# Patient Record
Sex: MALE | Race: ASIAN | NOT HISPANIC OR LATINO | Employment: OTHER | URBAN - METROPOLITAN AREA
[De-identification: names, ages, dates, MRNs, and addresses within clinical notes are randomized per-mention and may not be internally consistent; named-entity substitution may affect disease eponyms.]

---

## 2017-02-21 ENCOUNTER — OFFICE VISIT (OUTPATIENT)
Dept: URGENT CARE | Facility: URGENT CARE | Age: 57
End: 2017-02-21
Payer: COMMERCIAL

## 2017-02-21 VITALS
SYSTOLIC BLOOD PRESSURE: 128 MMHG | HEART RATE: 85 BPM | DIASTOLIC BLOOD PRESSURE: 85 MMHG | OXYGEN SATURATION: 99 % | TEMPERATURE: 99.1 F | WEIGHT: 188 LBS

## 2017-02-21 DIAGNOSIS — R07.89 CHEST TIGHTNESS: ICD-10-CM

## 2017-02-21 DIAGNOSIS — R50.9 FEVER, UNSPECIFIED: ICD-10-CM

## 2017-02-21 DIAGNOSIS — J18.9 PNEUMONIA DUE TO INFECTIOUS ORGANISM, UNSPECIFIED LATERALITY, UNSPECIFIED PART OF LUNG: Primary | ICD-10-CM

## 2017-02-21 PROCEDURE — 99204 OFFICE O/P NEW MOD 45 MIN: CPT | Performed by: PHYSICIAN ASSISTANT

## 2017-02-21 RX ORDER — CODEINE PHOSPHATE AND GUAIFENESIN 10; 100 MG/5ML; MG/5ML
5-10 SOLUTION ORAL
Qty: 120 ML | Refills: 0 | Status: SHIPPED | OUTPATIENT
Start: 2017-02-21 | End: 2018-05-17

## 2017-02-21 RX ORDER — AZITHROMYCIN 250 MG/1
TABLET, FILM COATED ORAL
Qty: 6 TABLET | Refills: 0 | Status: SHIPPED | OUTPATIENT
Start: 2017-02-21 | End: 2018-05-17

## 2017-02-21 RX ORDER — ALBUTEROL SULFATE 90 UG/1
2 AEROSOL, METERED RESPIRATORY (INHALATION) EVERY 6 HOURS
Qty: 1 INHALER | Refills: 0 | Status: SHIPPED | OUTPATIENT
Start: 2017-02-21 | End: 2019-12-23

## 2017-02-21 NOTE — NURSING NOTE
Chief Complaint   Patient presents with     Cough     cough, cold, itchy throat, off/on fever, running stuffy nose since last Wednesday - pt would like to be checked for Pneumonia.        Initial /85 (BP Location: Left arm, Patient Position: Chair, Cuff Size: Adult Regular)  Pulse 85  Temp 99.1  F (37.3  C) (Oral)  Wt 188 lb (85.3 kg)  SpO2 99% There is no height or weight on file to calculate BMI.  Medication Reconciliation: complete

## 2017-02-21 NOTE — MR AVS SNAPSHOT
"              After Visit Summary   2017    Drew Bradford    MRN: [id number]           Patient Information     Date Of Birth          1960        Visit Information        Provider Department      2017 3:45 PM Logan Carnes PA-C [facility]        Today's Diagnoses     Pneumonia due to infectious organism, unspecified laterality, unspecified part of lung    -  1    Fever, unspecified        Chest tightness           Follow-ups after your visit        Who to contact     If you have questions or need follow up information about today's clinic visit or your schedule please contact [facility] directly at [phone].  Normal or non-critical lab and imaging results will be communicated to you by MyChart, letter or phone within 4 business days after the clinic has received the results. If you do not hear from us within 7 days, please contact the clinic through MyChart or phone. If you have a critical or abnormal lab result, we will notify you by phone as soon as possible.  Submit refill requests through [employer] or call your pharmacy and they will forward the refill request to us. Please allow 3 business days for your refill to be completed.          Additional Information About Your Visit        MyChart Information     [employer] lets you send messages to your doctor, view your test results, renew your prescriptions, schedule appointments and more. To sign up, go to [email or web address] . Click on \"Log in\" on the left side of the screen, which will take you to the Welcome page. Then click on \"Sign up Now\" on the right side of the page.     You will be asked to enter the access code listed below, as well as some personal information. Please follow the directions to create your username and password.     Your access code is: [email or web address]  Expires: 2017  8:30 PM     Your access code will  in 90 days. If you need help or a new code, please call " your [facility] clinic or [phone].        Care EveryWhere ID     This is your Care EveryWhere ID. This could be used by other organizations to access your [facility] medical records  [id number]        Your Vitals Were     Pulse Temperature Pulse Oximetry             85 99.1  F (37.3  C) (Oral) 99%          Blood Pressure from Last 3 Encounters:   02/21/17 128/85    Weight from Last 3 Encounters:   02/21/17 188 lb (85.3 kg)              Today, you had the following     No orders found for display         Today's Medication Changes          These changes are accurate as of: 2/21/17  8:30 PM.  If you have any questions, ask your nurse or doctor.               Start taking these medicines.        Dose/Directions    albuterol 108 (90 BASE) MCG/ACT Inhaler   Commonly known as:  PROVENTIL HFA   Used for:  Chest tightness   Started by:  Logan Carnes PA-C        Dose:  2 puff   Inhale 2 puffs into the lungs every 6 hours   Quantity:  1 Inhaler   Refills:  0       azithromycin 250 MG tablet   Commonly known as:  ZITHROMAX   Used for:  Pneumonia due to infectious organism, unspecified laterality, unspecified part of lung   Started by:  Logan Carnes PA-C        2 tabs po qd day 1, then 1 tab po qd days 2-5   Quantity:  6 tablet   Refills:  0       guaiFENesin-codeine 100-10 MG/5ML Soln solution   Commonly known as:  ROBITUSSIN AC   Used for:  Pneumonia due to infectious organism, unspecified laterality, unspecified part of lung   Started by:  Logan Carnes PA-C        Dose:  5-10 mL   Take 5-10 mLs by mouth nightly as needed for cough   Quantity:  120 mL   Refills:  0            Where to get your medicines      These medications were sent to [id number] - [facility] [address] [facility]  [address] [address] [facility] [address]     Phone:  [phone]     albuterol 108 (90 BASE) MCG/ACT Inhaler    azithromycin 250 MG tablet         Some of these will need a paper prescription and others can  be bought over the counter.  Ask your nurse if you have questions.     Bring a paper prescription for each of these medications     guaiFENesin-codeine 100-10 MG/5ML Soln solution                Primary Care Provider    None Specified       No primary provider on file.        Thank you!     Thank you for choosing [facility]  for your care. Our goal is always to provide you with excellent care. Hearing back from our patients is one way we can continue to improve our services. Please take a few minutes to complete the written survey that you may receive in the mail after your visit with us. Thank you!             Your Updated Medication List - Protect others around you: Learn how to safely use, store and throw away your medicines at www.disposemymeds.org.          This list is accurate as of: 2/21/17  8:30 PM.  Always use your most recent med list.                   Brand Name Dispense Instructions for use    albuterol 108 (90 BASE) MCG/ACT Inhaler    PROVENTIL HFA    1 Inhaler    Inhale 2 puffs into the lungs every 6 hours       azithromycin 250 MG tablet    ZITHROMAX    6 tablet    2 tabs po qd day 1, then 1 tab po qd days 2-5       guaiFENesin-codeine 100-10 MG/5ML Soln solution    ROBITUSSIN AC    120 mL    Take 5-10 mLs by mouth nightly as needed for cough       UNKNOWN TO PATIENT      One blood pressure medication daily - medication from [place]. Unknown Name.       VICKS DAYQUIL COUGH PO

## 2017-02-22 NOTE — PROGRESS NOTES
SUBJECTIVE:   Drew Bradford is a 56 year old male presenting with a chief complaint of fever, facial pain/pressure and chest congestion, productive cough.  Onset of symptoms was 6 day(s) ago.  Course of illness is worsening.    Severity moderate  Current and Associated symptoms: productive cough, chest congestion and wheezing  Treatment measures tried include OTC meds.  Predisposing factors include recent illness.    PMH:  None    Past Fam Hx:  HTN  ASTHMA     Allergies   Allergen Reactions     Penicillins      Amoxicillin Rash     Back pain, kidney pain.          Social History   Substance Use Topics     Smoking status: Never Smoker     Smokeless tobacco: Never Used     Alcohol use Not on file       ROS:  CONSTITUTIONAL:POSITIVE  for chills  EYES: Negative for redness or discharge  INTEGUMENTARY/SKIN: NEGATIVE for worrisome rashes, moles or lesions  ENT/MOUTH: POSITIVE for nasal congestion  RESP:POSITIVE for cough-productive  CV: NEGATIVE for chest pain, palpitations or peripheral edema  : NEGATIVE for dysuria or frequency  GI: NEGATIVE for nausea, abdominal pain, heartburn, or change in bowel habits  MUSCULOSKELETAL: NEGATIVE for significant arthralgias or myalgia  VASC:POSITIVE for cold extremities with chills  NEURO: NEGATIVE for weakness, dizziness or paresthesias    OBJECTIVE  :/85 (BP Location: Left arm, Patient Position: Chair, Cuff Size: Adult Regular)  Pulse 85  Temp 99.1  F (37.3  C) (Oral)  Wt 188 lb (85.3 kg)  SpO2 99%  GENERAL APPEARANCE: healthy, alert and no distress  EYES: EOMI,  PERRL, conjunctiva clear  HENT: ear canals and TM's normal.  Nose and mouth without ulcers, erythema or lesions  NECK: supple, nontender, no lymphadenopathy  RESP: rhonchi R lower anterior and R lower posterior  CV: regular rates and rhythm, normal S1 S2, no murmur noted  ABDOMEN:  soft, nontender, no HSM or masses and bowel sounds normal  MS:Normal ROM, negative for muscle tenderness with palpation  NEURO: Normal  strength and tone, sensory exam grossly normal,  normal speech and mentation  SKIN: no suspicious lesions or rashes    ASSESSMENT/PLAN:    ICD-10-CM    1. Pneumonia due to infectious organism, unspecified laterality, unspecified part of lung J18.9 azithromycin (ZITHROMAX) 250 MG tablet     guaiFENesin-codeine (ROBITUSSIN AC) 100-10 MG/5ML SOLN solution   2. Fever, unspecified R50.9    3. Chest tightness R07.89 albuterol (PROVENTIL HFA) 108 (90 BASE) MCG/ACT Inhaler     Use albuterol for wheezing and chest tightness  Tylenol or advil for fever  Fluids, rest  Advise having follow up with PCP for recheck  If symptoms were to worsen then go to the ED

## 2018-05-17 ENCOUNTER — OFFICE VISIT (OUTPATIENT)
Dept: FAMILY MEDICINE | Facility: CLINIC | Age: 58
End: 2018-05-17
Payer: COMMERCIAL

## 2018-05-17 VITALS
OXYGEN SATURATION: 98 % | SYSTOLIC BLOOD PRESSURE: 128 MMHG | BODY MASS INDEX: 27.58 KG/M2 | HEART RATE: 72 BPM | TEMPERATURE: 99.2 F | HEIGHT: 69 IN | DIASTOLIC BLOOD PRESSURE: 82 MMHG | WEIGHT: 186.2 LBS

## 2018-05-17 DIAGNOSIS — Z76.89 ESTABLISHING CARE WITH NEW DOCTOR, ENCOUNTER FOR: ICD-10-CM

## 2018-05-17 DIAGNOSIS — J30.2 CHRONIC SEASONAL ALLERGIC RHINITIS, UNSPECIFIED TRIGGER: ICD-10-CM

## 2018-05-17 DIAGNOSIS — H52.4 PRESBYOPIA: ICD-10-CM

## 2018-05-17 DIAGNOSIS — I10 ESSENTIAL HYPERTENSION: Primary | ICD-10-CM

## 2018-05-17 PROCEDURE — 99214 OFFICE O/P EST MOD 30 MIN: CPT | Performed by: INTERNAL MEDICINE

## 2018-05-17 RX ORDER — METHYLPREDNISOLONE 4 MG
TABLET, DOSE PACK ORAL
Qty: 21 TABLET | Refills: 3 | Status: SHIPPED | OUTPATIENT
Start: 2018-05-17 | End: 2018-11-15

## 2018-05-17 RX ORDER — AMLODIPINE BESYLATE 10 MG/1
10 TABLET ORAL DAILY
Qty: 90 TABLET | Refills: 3 | Status: SHIPPED | OUTPATIENT
Start: 2018-05-17 | End: 2019-05-22

## 2018-05-17 RX ORDER — CETIRIZINE HYDROCHLORIDE 10 MG/1
10 TABLET ORAL EVERY EVENING
Qty: 90 TABLET | Refills: 3 | Status: SHIPPED | OUTPATIENT
Start: 2018-05-17 | End: 2019-05-22

## 2018-05-17 NOTE — MR AVS SNAPSHOT
After Visit Summary   5/17/2018    Drew Bardford    MRN: [id number]           Patient Information     Date Of Birth          1960        Visit Information        Provider Department      5/17/2018 4:45 PM Tara Mehta MD; [employer] [facility]        Today's Diagnoses     Essential hypertension    -  1    Chronic seasonal allergic rhinitis, unspecified trigger        Presbyopia        Establishing care with new doctor, encounter for           Follow-ups after your visit        Additional Services     ALLERGY/ASTHMA ADULT REFERRAL       Your provider has referred you to: [facility] [place] - [phone]  [email or web address]    Please be aware that coverage of these services is subject to the terms and limitations of your health insurance plan.  Call member services at your health plan with any benefit or coverage questions.      Please bring the following with you to your appointment:    (1) Any X-Rays, CTs or MRIs which have been performed.  Contact the facility where they were done to arrange for  prior to your scheduled appointment.    (2) List of current medications  (3) This referral request   (4) Any documents/labs given to you for this referral            OPHTHALMOLOGY ADULT REFERRAL       Your provider has referred you to: N: [facility] [place] [phone] [email or web address]    Please be aware that coverage of these services is subject to the terms and limitations of your health insurance plan.  Call member services at your health plan with any benefit or coverage questions.      Please bring the following with you to your appointment:    (1) Any X-Rays, CTs or MRIs which have been performed.  Contact the facility where they were done to arrange for  prior to your scheduled appointment.    (2) List of current medications  (3) This referral  "request   (4) Any documents/labs given to you for this referral                  Your next 10 appointments already scheduled     May 24, 2018 10:00 AM CDT   [facility]    [address] [facility] [id number] [phone]           Bring a current list of meds and any records pertaining to this visit. For Physicals, please bring immunization records and any forms needing to be filled out. Please arrive 10 minutes early to complete paperwork.              Who to contact     If you have questions or need follow up information about today's clinic visit or your schedule please contact [facility] directly at [phone].  Normal or non-critical lab and imaging results will be communicated to you by MyChart, letter or phone within 4 business days after the clinic has received the results. If you do not hear from us within 7 days, please contact the clinic through [employer] or phone. If you have a critical or abnormal lab result, we will notify you by phone as soon as possible.  Submit refill requests through [employer] or call your pharmacy and they will forward the refill request to us. Please allow 3 business days for your refill to be completed.          Additional Information About Your Visit        [employer] Information     [employer] lets you send messages to your doctor, view your test results, renew your prescriptions, schedule appointments and more. To sign up, go to [email or web address] . Click on \"Log in\" on the left side of the screen, which will take you to the Welcome page. Then click on \"Sign up Now\" on the right side of the page.     You will be asked to enter the access code listed below, as well as some personal information. Please follow the directions to create your username and password.     Your access code is: [email or web address]  Expires: 8/15/2018  5:11 PM     Your access code will  in 90 days. If you need " "help or a new code, please call your [facility] clinic or [phone].        Care EveryWhere ID     This is your Care EveryWhere ID. This could be used by other organizations to access your [facility] medical records  [id number]        Your Vitals Were     Pulse Temperature Height Pulse Oximetry BMI (Body Mass Index)       72 99.2  F (37.3  C) 5' 9\" (1.753 m) 98% 27.5 kg/m2        Blood Pressure from Last 3 Encounters:   05/17/18 128/82   02/21/17 128/85    Weight from Last 3 Encounters:   05/17/18 186 lb 3.2 oz (84.5 kg)   02/21/17 188 lb (85.3 kg)              We Performed the Following     ALLERGY/ASTHMA ADULT REFERRAL     [facility] ACCESS CODE - Add PCP and Click on cosign on right     OPHTHALMOLOGY ADULT REFERRAL          Today's Medication Changes          These changes are accurate as of 5/17/18 11:59 PM.  If you have any questions, ask your nurse or doctor.               Start taking these medicines.        Dose/Directions    cetirizine 10 MG tablet   Commonly known as:  zyrTEC   Used for:  Chronic seasonal allergic rhinitis, unspecified trigger   Started by:  Tara Mehta MD        Dose:  10 mg   Take 1 tablet (10 mg) by mouth every evening   Quantity:  90 tablet   Refills:  3       methylPREDNISolone 4 MG tablet   Commonly known as:  MEDROL DOSEPAK   Used for:  Chronic seasonal allergic rhinitis, unspecified trigger   Started by:  Tara Mehta MD        Follow package instructions   Quantity:  21 tablet   Refills:  3         These medicines have changed or have updated prescriptions.        Dose/Directions    amLODIPine 10 MG tablet   Commonly known as:  NORVASC   This may have changed:  medication strength   Used for:  Essential hypertension   Changed by:  Tara Mehta MD        Dose:  10 mg   Take 1 tablet (10 mg) by mouth daily   Quantity:  90 tablet   Refills:  3            Where to get your medicines      These medications were sent to [id number] - [place], [place] - [address] [facility]  [address] [facility] [address]     Phone:  [phone]     amLODIPine 10 MG tablet         Some of these will need a paper prescription and others can be bought over the counter.  Ask your nurse if you have questions.     Bring a paper prescription for each of these medications     cetirizine 10 MG tablet    methylPREDNISolone 4 MG tablet                Primary Care Provider Office Phone # Fax #    Tara Kyle Mehta [phone] [address] [address]        Equal Access to Services     [facility]: Hadii aad ku hadasho Soomaali, waaxda luqadaha, qaybta kaalmada adeegyada, guillermo acosta . So [facility] [phone].    ATENCIÓN: Si habla español, tiene a krause disposición servicios gratuitos de asistencia lingüística. [facility] [phone].    We comply with applicable federal civil rights laws and [place] laws. We do not discriminate on the basis of race, color, national origin, age, disability, sex, sexual orientation, or gender identity.            Thank you!     Thank you for choosing [facility]  for your care. Our goal is always to provide you with excellent care. Hearing back from our patients is one way we can continue to improve our services. Please take a few minutes to complete the written survey that you may receive in the mail after your visit with us. Thank you!             Your Updated Medication List - Protect others around you: Learn how to safely use, store and throw away your medicines at www.disposemymeds.org.          This list is accurate as of 5/17/18 11:59 PM.  Always use your most recent med list.                   Brand Name Dispense Instructions for use Diagnosis    albuterol 108 (90 Base) MCG/ACT Inhaler    PROVENTIL HFA    1 Inhaler    Inhale 2 puffs into the lungs every 6 hours    Chest tightness       amLODIPine 10 MG tablet    NORVASC    90 tablet    Take 1 tablet (10 mg) by  mouth daily    Essential hypertension       Calcium + D3 600-200 MG-UNIT Tabs      Take by mouth daily        cetirizine 10 MG tablet    zyrTEC    90 tablet    Take 1 tablet (10 mg) by mouth every evening    Chronic seasonal allergic rhinitis, unspecified trigger       GINKGO BILOBA COMPLEX PO      Take by mouth daily        methylPREDNISolone 4 MG tablet    MEDROL DOSEPAK    21 tablet    Follow package instructions    Chronic seasonal allergic rhinitis, unspecified trigger

## 2018-05-17 NOTE — PROGRESS NOTES
SUBJECTIVE:   Drew Bradford is a 57 year old male who presents to clinic today for the following health issues:      New Patient/Transfer of Care    Hypertension      HPI:   Patient Drew Bradford is a very pleasant 57 year old male with history of HTN, seasonal allergies, presbyopia in both eyes who presents to Internal Medicine clinic today to establish care and for follow up of multiple concerns. Regarding the patient's hypertension, the patient's BP is currently well controlled and he is due for a refill of his Amlodipine BP medication at this time. Regarding the patient's chronic poorly controlled seasonal allergies, the patient is interested in an evaluation by [facility] allergy/asthma clinic in [place] going forward including possible environmental and food allergy testing. Regarding his chronic presbyopia in both eyes, he is interested in an ophthalmology clinic referral for further evaluation and management going forward. He denies any chest pain, headaches, fever or chills.         Current Medications:     Current Outpatient Prescriptions   Medication Sig Dispense Refill     albuterol (PROVENTIL HFA) 108 (90 BASE) MCG/ACT Inhaler Inhale 2 puffs into the lungs every 6 hours (Patient not taking: Reported on 5/17/2018) 1 Inhaler 0     amLODIPine (NORVASC) 10 MG tablet Take 1 tablet (10 mg) by mouth daily 90 tablet 3     Calcium Carb-Cholecalciferol (CALCIUM + D3) 600-200 MG-UNIT TABS Take by mouth daily       cetirizine (ZYRTEC) 10 MG tablet Take 1 tablet (10 mg) by mouth every evening 90 tablet 3     GINKGO BILOBA COMPLEX PO Take by mouth daily       methylPREDNISolone (MEDROL DOSEPAK) 4 MG tablet Follow package instructions 21 tablet 3     [DISCONTINUED] AMLODIPINE BESYLATE PO Take 10 mg by mouth daily           Allergies:      Allergies   Allergen Reactions     Penicillins      Amoxicillin Rash     Back pain, kidney pain.             Past Medical History:     Past Medical History:   Diagnosis Date     HTN  "(hypertension)      Presbyopia      Seasonal allergies          Past Surgical History:   No past surgical history on file.      Family Medical History:   No family history on file.      Social History:     Social History     Social History     Marital status: Single     Spouse name: N/A     Number of children: N/A     Years of education: N/A     Occupational History     Not on file.     Social History Main Topics     Smoking status: Never Smoker     Smokeless tobacco: Never Used     Alcohol use Yes      Comment: 2 beers per month     Drug use: No     Sexual activity: No     Other Topics Concern     Not on file     Social History Narrative           Review of System:     Constitutional: Negative for fever or chills  Skin: Negative for rashes  Eyes: Positive for chronic presbyopia in both eyes  Ears/Nose/Throat: Positive for nasal congestion, seasonal allergies  Respiratory: No shortness of breath, dyspnea on exertion, cough, or hemoptysis  Cardiovascular: Negative for chest pain  Gastrointestinal: Negative for nausea, vomiting  Genitourinary: Negative for dysuria, hematuria  Musculoskeletal: Negative for myalgias  Neurologic: Negative for headaches  Psychiatric: Negative for depression, anxiety  Hematologic/Lymphatic/Immunologic: Negative  Endocrine: Negative  Behavioral: Negative for tobacco use       Physical Exam:   /82 (BP Location: Left arm, Cuff Size: Adult Large)  Pulse 72  Temp 99.2  F (37.3  C)  Ht 5' 9\" (1.753 m)  Wt 186 lb 3.2 oz (84.5 kg)  SpO2 98%  BMI 27.5 kg/m2    GENERAL: alert and no distress  EYES: eyes grossly normal to inspection, and conjunctivae and sclerae normal  HENT: Normocephalic atraumatic. Nose and mouth without ulcers or lesions. Seasonal allergies and nasal congestion present.  Eyes: Presbyopia symptoms present  NECK: supple  RESP: lungs clear to auscultation   CV: regular rate and rhythm, normal S1 S2  LYMPH: no peripheral edema   ABDOMEN: nondistended  MS: no gross " musculoskeletal defects noted  SKIN: no suspicious lesions or rashes  NEURO: Alert & Oriented x 3.   PSYCH: mentation appears normal, affect normal        Diagnostic Test Results:     Diagnostic Test Results:  No results found for this or any previous visit.    ASSESSMENT/PLAN:       (I10) Essential hypertension  (primary encounter diagnosis)  Comment: chronic HTN, stable on current medication. He is due for a refill of his amlodipine BP medication at this time.  Plan: I have ordered refill of his amLODIPine (NORVASC) 10 MG tablet BP medication at this time.      (J30.2) Chronic seasonal allergic rhinitis, unspecified trigger  Comment: chronic seasonal allergic with allergic rhinitis, not well controlled  Plan: I have ordered ALLERGY/ASTHMA ADULT REFERRAL for further evaluation including possible allergy testing. In addition, I have prescribed cetirizine (ZYRTEC) 10 MG tablet, methylPREDNISolone    (MEDROL DOSEPAK) 4 MG tablet for treatment.      (H52.4) Presbyopia  Comment: chronic presbyopia in both eyes  Plan: I have ordered OPHTHALMOLOGY ADULT REFERRAL for further evaluation and management going forward.      Follow Up Plan:     Patient is instructed to return to Internal Medicine clinic for follow-up visit in 1 month.        Tara Mehta MD  Internal Medicine  [facility]

## 2018-05-24 ENCOUNTER — OFFICE VISIT (OUTPATIENT)
Dept: FAMILY MEDICINE | Facility: CLINIC | Age: 58
End: 2018-05-24
Payer: COMMERCIAL

## 2018-05-24 VITALS
DIASTOLIC BLOOD PRESSURE: 83 MMHG | OXYGEN SATURATION: 96 % | HEIGHT: 69 IN | WEIGHT: 183.8 LBS | TEMPERATURE: 97.5 F | SYSTOLIC BLOOD PRESSURE: 125 MMHG | BODY MASS INDEX: 27.22 KG/M2 | HEART RATE: 65 BPM

## 2018-05-24 DIAGNOSIS — Z11.4 SCREENING FOR HIV (HUMAN IMMUNODEFICIENCY VIRUS): ICD-10-CM

## 2018-05-24 DIAGNOSIS — Z13.1 SCREENING FOR DIABETES MELLITUS: ICD-10-CM

## 2018-05-24 DIAGNOSIS — R06.83 PRIMARY SNORING: ICD-10-CM

## 2018-05-24 DIAGNOSIS — Z11.59 NEED FOR HEPATITIS C SCREENING TEST: ICD-10-CM

## 2018-05-24 DIAGNOSIS — Z12.11 SCREEN FOR COLON CANCER: ICD-10-CM

## 2018-05-24 DIAGNOSIS — Z13.220 LIPID SCREENING: ICD-10-CM

## 2018-05-24 DIAGNOSIS — Z12.5 SCREENING FOR PROSTATE CANCER: ICD-10-CM

## 2018-05-24 DIAGNOSIS — Z00.00 ROUTINE HISTORY AND PHYSICAL EXAMINATION OF ADULT: Primary | ICD-10-CM

## 2018-05-24 LAB
ALBUMIN SERPL-MCNC: 3.9 G/DL (ref 3.4–5)
ALP SERPL-CCNC: 48 U/L (ref 40–150)
ALT SERPL W P-5'-P-CCNC: 36 U/L (ref 0–70)
ANION GAP SERPL CALCULATED.3IONS-SCNC: 7 MMOL/L (ref 3–14)
AST SERPL W P-5'-P-CCNC: 23 U/L (ref 0–45)
BASOPHILS # BLD AUTO: 0 10E9/L (ref 0–0.2)
BASOPHILS NFR BLD AUTO: 0.4 %
BILIRUB SERPL-MCNC: 0.8 MG/DL (ref 0.2–1.3)
BUN SERPL-MCNC: 13 MG/DL (ref 7–30)
CALCIUM SERPL-MCNC: 8.5 MG/DL (ref 8.5–10.1)
CHLORIDE SERPL-SCNC: 107 MMOL/L (ref 94–109)
CHOLEST SERPL-MCNC: 204 MG/DL
CO2 SERPL-SCNC: 26 MMOL/L (ref 20–32)
CREAT SERPL-MCNC: 0.96 MG/DL (ref 0.66–1.25)
DIFFERENTIAL METHOD BLD: NORMAL
EOSINOPHIL # BLD AUTO: 0.3 10E9/L (ref 0–0.7)
EOSINOPHIL NFR BLD AUTO: 3.9 %
ERYTHROCYTE [DISTWIDTH] IN BLOOD BY AUTOMATED COUNT: 12.4 % (ref 10–15)
GFR SERPL CREATININE-BSD FRML MDRD: 80 ML/MIN/1.7M2
GLUCOSE SERPL-MCNC: 98 MG/DL (ref 70–99)
HBA1C MFR BLD: 5.4 % (ref 0–5.6)
HCT VFR BLD AUTO: 48.2 % (ref 40–53)
HDLC SERPL-MCNC: 46 MG/DL
HGB BLD-MCNC: 16.4 G/DL (ref 13.3–17.7)
LDLC SERPL CALC-MCNC: 124 MG/DL
LYMPHOCYTES # BLD AUTO: 1.4 10E9/L (ref 0.8–5.3)
LYMPHOCYTES NFR BLD AUTO: 17.8 %
MCH RBC QN AUTO: 30.8 PG (ref 26.5–33)
MCHC RBC AUTO-ENTMCNC: 34 G/DL (ref 31.5–36.5)
MCV RBC AUTO: 91 FL (ref 78–100)
MONOCYTES # BLD AUTO: 0.6 10E9/L (ref 0–1.3)
MONOCYTES NFR BLD AUTO: 7.3 %
NEUTROPHILS # BLD AUTO: 5.5 10E9/L (ref 1.6–8.3)
NEUTROPHILS NFR BLD AUTO: 70.6 %
NONHDLC SERPL-MCNC: 158 MG/DL
PLATELET # BLD AUTO: 177 10E9/L (ref 150–450)
POTASSIUM SERPL-SCNC: 4.1 MMOL/L (ref 3.4–5.3)
PROT SERPL-MCNC: 7.2 G/DL (ref 6.8–8.8)
PSA SERPL-ACNC: 1.22 UG/L (ref 0–4)
RBC # BLD AUTO: 5.32 10E12/L (ref 4.4–5.9)
SODIUM SERPL-SCNC: 140 MMOL/L (ref 133–144)
TRIGL SERPL-MCNC: 170 MG/DL
WBC # BLD AUTO: 7.7 10E9/L (ref 4–11)

## 2018-05-24 PROCEDURE — 83036 HEMOGLOBIN GLYCOSYLATED A1C: CPT | Performed by: INTERNAL MEDICINE

## 2018-05-24 PROCEDURE — 80061 LIPID PANEL: CPT | Performed by: INTERNAL MEDICINE

## 2018-05-24 PROCEDURE — 36415 COLL VENOUS BLD VENIPUNCTURE: CPT | Performed by: INTERNAL MEDICINE

## 2018-05-24 PROCEDURE — 99396 PREV VISIT EST AGE 40-64: CPT | Performed by: INTERNAL MEDICINE

## 2018-05-24 PROCEDURE — 80053 COMPREHEN METABOLIC PANEL: CPT | Performed by: INTERNAL MEDICINE

## 2018-05-24 PROCEDURE — G0103 PSA SCREENING: HCPCS | Performed by: INTERNAL MEDICINE

## 2018-05-24 PROCEDURE — 87389 HIV-1 AG W/HIV-1&-2 AB AG IA: CPT | Performed by: INTERNAL MEDICINE

## 2018-05-24 PROCEDURE — 86803 HEPATITIS C AB TEST: CPT | Performed by: INTERNAL MEDICINE

## 2018-05-24 PROCEDURE — 85025 COMPLETE CBC W/AUTO DIFF WBC: CPT | Performed by: INTERNAL MEDICINE

## 2018-05-24 NOTE — PROGRESS NOTES
SUBJECTIVE:   CC: Drew Bradford is an 57 year old male who presents for preventative health visit.     Healthy Habits:    Do you get at least three servings of calcium containing foods daily (dairy, green leafy vegetables, etc.)? yes    Amount of exercise or daily activities, outside of work: 6 day(s) per week    Problems taking medications regularly No    Medication side effects: No    Have you had an eye exam in the past two years? sched on 5-    Do you see a dentist twice per year? no    Do you have sleep apnea, excessive snoring or daytime drowsiness?no       Today's PHQ-2 Score:   PHQ-2 ( 1999 Pfizer) 5/17/2018   Q1: Little interest or pleasure in doing things 0   Q2: Feeling down, depressed or hopeless 0   PHQ-2 Score 0       Abuse: Current or Past(Physical, Sexual or Emotional)- No  Do you feel safe in your environment - Yes    Social History   Substance Use Topics     Smoking status: Never Smoker     Smokeless tobacco: Never Used     Alcohol use Yes      Comment: 2 beers per month      If you drink alcohol do you typically have >3 drinks per day or >7 drinks per week? No                      Last PSA: No results found for: PSA    Reviewed orders with patient. Reviewed health maintenance and updated orders accordingly - Yes  Labs reviewed in EPIC    Reviewed and updated as needed this visit by clinical staff         Reviewed and updated as needed this visit by Provider        Past Medical History:   Diagnosis Date     HTN (hypertension)      Presbyopia      Seasonal allergies         ROS:  CONSTITUTIONAL: NEGATIVE for fever, chills, change in weight. Positive for chronic snoring.  INTEGUMENTARY/SKIN: NEGATIVE for worrisome rashes, moles or lesions  EYES: NEGATIVE for vision changes or irritation  ENT: NEGATIVE for ear, mouth and throat problems  RESP: NEGATIVE for significant cough or SOB  CV: NEGATIVE for chest pain, palpitations or peripheral edema  GI: NEGATIVE for nausea, abdominal pain,  "heartburn, or change in bowel habits   male: negative for dysuria, hematuria, decreased urinary stream, erectile dysfunction, urethral discharge  MUSCULOSKELETAL: NEGATIVE for significant arthralgias or myalgia  NEURO: NEGATIVE for weakness, dizziness or paresthesias  PSYCHIATRIC: NEGATIVE for changes in mood or affect    OBJECTIVE:   /83 (BP Location: Right arm, Cuff Size: Adult Large)  Pulse 65  Temp 97.5  F (36.4  C) (Oral)  Ht 5' 9\" (1.753 m)  Wt 183 lb 12.8 oz (83.4 kg)  SpO2 96%  BMI 27.14 kg/m2  EXAM:  GENERAL: alert and no distress  EYES: Eyes grossly normal to inspection, PERRL and conjunctivae and sclerae normal  HENT: ear canals and TM's normal, nose and mouth without ulcers or lesions  NECK: no adenopathy, no asymmetry, masses, or scars and thyroid normal to palpation  RESP: lungs clear to auscultation - no rales, rhonchi or wheezes  CV: regular rate and rhythm, normal S1 S2, no S3 or S4, no murmur, click or rub, no peripheral edema and peripheral pulses strong  ABDOMEN: soft, nontender, no hepatosplenomegaly, no masses and bowel sounds normal  MS: no gross musculoskeletal defects noted, no edema  SKIN: no suspicious lesions or rashes  NEURO: Normal strength and tone, mentation intact and speech normal  PSYCH: mentation appears normal, affect normal/bright    ASSESSMENT/PLAN:   (Z00.00) Routine history and physical examination of adult  (primary encounter diagnosis)  Comment: yearly physical exam today  Plan: I have ordered labs for CBC with platelets and differential, Comprehensive metabolic panel (BMP + Alb, Alk Phos, ALT, AST, Total. Bili, TP)      (Z12.11) Screen for colon cancer  Comment: patient is due for colonoscopy for colon cancer screening  Plan: I have ordered GASTROENTEROLOGY ADULT REF PROCEDURE ONLY Ted Vargas [phone]; No Provider Preference      (Z11.59) Need for hepatitis C screening test  Comment: patient is due for hep c screening  Plan: I have ordered lab " "for Hepatitis C Screen Reflex to HCV RNA Quant and Genotype      (Z11.4) Screening for HIV (human immunodeficiency virus)  Comment: patient is due for HIV screening  Plan: I have ordered lab for HIV Screening      (Z12.5) Screening for prostate cancer  Comment: patient is due for PSA lab  Plan: I have ordered lab for PSA, screen      (Z13.1) Screening for diabetes mellitus  Comment: patient is due for diabetes screening  Plan: I have ordered lab for Hemoglobin A1c      (Z13.220) Lipid screening  Comment: patient is due for lipid screening  Plan: I have ordered lab for Lipid panel reflex to direct LDL Fasting      (R06.83) Primary snoring  Comment: chronic primary snoring concerning for undiagnosed AWA  Plan: I have ordered SLEEP EVALUATION & MANAGEMENT REFERRAL - [facility]  -[facility] [phone]  (Age 18 and up) for further evaluation.        COUNSELING:  Reviewed preventive health counseling, as reflected in patient instructions  Special attention given to:        Regular exercise       Healthy diet/nutrition       reports that he has never smoked. He has never used smokeless tobacco.    Estimated body mass index is 27.5 kg/(m^2) as calculated from the following:    Height as of 5/17/18: 5' 9\" (1.753 m).    Weight as of 5/17/18: 186 lb 3.2 oz (84.5 kg).   Weight management plan: Discussed healthy diet and exercise guidelines and patient will follow up in 12 months in clinic to re-evaluate.    Counseling Resources:  ATP IV Guidelines  Pooled Cohorts Equation Calculator  FRAX Risk Assessment  ICSI Preventive Guidelines  Dietary Guidelines for Americans, 2010  USDA's MyPlate  ASA Prophylaxis  Lung CA Screening    Tara Mehta MD  [facility]  "

## 2018-05-24 NOTE — MR AVS SNAPSHOT
After Visit Summary   5/24/2018    Drew Bradford    MRN: [id number]           Patient Information     Date Of Birth          1960        Visit Information        Provider Department      5/24/2018 9:30 AM Tara Mehta MD; [facility] [employer] [employer] [facility] [place] [facility]        Today's Diagnoses     Routine history and physical examination of adult    -  1    Screen for colon cancer        Need for hepatitis C screening test        Screening for HIV (human immunodeficiency virus)        Need for prophylactic vaccination with tetanus-diphtheria (TD)        Screening for prostate cancer        Screening for diabetes mellitus        Lipid screening        Primary snoring          Care Instructions      Preventive Health Recommendations  Male Ages 50 - 64    Yearly exam:             See your health care provider every year in order to  o   Review health changes.   o   Discuss preventive care.    o   Review your medicines if your doctor has prescribed any.     Have a cholesterol test every 5 years, or more frequently if you are at risk for high cholesterol/heart disease.     Have a diabetes test (fasting glucose) every three years. If you are at risk for diabetes, you should have this test more often.     Have a colonoscopy at age 50, or have a yearly FIT test (stool test). These exams will check for colon cancer.      Talk with your health care provider about whether or not a prostate cancer screening test (PSA) is right for you.    You should be tested each year for STDs (sexually transmitted diseases), if you re at risk.     Shots: Get a flu shot each year. Get a tetanus shot every 10 years.     Nutrition:    Eat at least 5 servings of fruits and vegetables daily.     Eat whole-grain bread, whole-wheat pasta and brown rice instead of white grains and rice.     Talk to your provider about Calcium and Vitamin D.     Lifestyle    Exercise for at least 150 minutes a week (30 minutes a day, 5 days  a week). This will help you control your weight and prevent disease.     Limit alcohol to one drink per day.     No smoking.     Wear sunscreen to prevent skin cancer.     See your dentist every six months for an exam and cleaning.     See your eye doctor every 1 to 2 years.            Follow-ups after your visit        Additional Services     GASTROENTEROLOGY ADULT REF PROCEDURE ONLY Ted Vargas [phone]; No Provider Preference       Last Lab Result: No results found for: CR  Body mass index is 27.14 kg/(m^2).     Needed:  Yes  Language:  Mandarin    Patient will be contacted to schedule procedure.     Please be aware that coverage of these services is subject to the terms and limitations of your health insurance plan.  Call member services at your health plan with any benefit or coverage questions.  Any procedures must be performed at a [facility] facility OR coordinated by your clinic's referral office.    Please bring the following with you to your appointment:    (1) Any X-Rays, CTs or MRIs which have been performed.  Contact the facility where they were done to arrange for  prior to your scheduled appointment.    (2) List of current medications   (3) This referral request   (4) Any documents/labs given to you for this referral            SLEEP EVALUATION & MANAGEMENT REFERRAL - ADULT -[facility] [phone]  (Age 18 and up)       Please be aware that coverage of these services is subject to the terms and limitations of your health insurance plan.  Call member services at your health plan with any benefit or coverage questions.      Please bring the following to your appointment:    >>   List of current medications   >>   This referral request   >>   Any documents/labs given to you for this referral                      Future tests that were ordered for you today     Open Future Orders        Priority Expected Expires Ordered    SLEEP EVALUATION &  "MANAGEMENT REFERRAL - ADULT -[facility]  [place] [phone]  (Age 18 and up) Routine  5/24/2019 5/24/2018            Who to contact     If you have questions or need follow up information about today's clinic visit or your schedule please contact [facility] directly at [phone].  Normal or non-critical lab and imaging results will be communicated to you by MyChart, letter or phone within 4 business days after the clinic has received the results. If you do not hear from us within 7 days, please contact the clinic through [employer] or phone. If you have a critical or abnormal lab result, we will notify you by phone as soon as possible.  Submit refill requests through [employer] or call your pharmacy and they will forward the refill request to us. Please allow 3 business days for your refill to be completed.          Additional Information About Your Visit        [employer]'s Talking Information     [employer] gives you secure access to your electronic health record. If you see a primary care provider, you can also send messages to your care team and make appointments. If you have questions, please call your primary care clinic.  If you do not have a primary care provider, please call [phone] and they will assist you.        Care EveryWhere ID     This is your Care EveryWhere ID. This could be used by other organizations to access your [facility] medical records  [id number]        Your Vitals Were     Pulse Temperature Height Pulse Oximetry BMI (Body Mass Index)       65 97.5  F (36.4  C) (Oral) 5' 9\" (1.753 m) 96% 27.14 kg/m2        Blood Pressure from Last 3 Encounters:   05/24/18 125/83   05/17/18 128/82   02/21/17 128/85    Weight from Last 3 Encounters:   05/24/18 183 lb 12.8 oz (83.4 kg)   05/17/18 186 lb 3.2 oz (84.5 kg)   02/21/17 188 lb (85.3 kg)              We Performed the Following     CBC with platelets and differential     Comprehensive metabolic panel (BMP + Alb, Alk Phos, ALT, AST, " Total. Bili, TP)     GASTROENTEROLOGY ADULT REF PROCEDURE ONLY [facility] Vargas [phone]; No Provider Preference     Hemoglobin A1c     Hepatitis C Screen Reflex to HCV RNA Quant and Genotype     HIV Screening     Lipid panel reflex to direct LDL Fasting     PSA, screen        Primary Care Provider Office Phone # Fax #    Tara Kyle Mehta [phone] [address] [facility] [address]  [facility] [address]        Equal Access to Services     JOSE FLORES : Hadii aad ku hadasho Soomaali, waaxda luqadaha, qaybta kaalmada adeegyada, waxay idiin hayaan adeeg kharash la'aan . So [facility] [phone].    ATENCIÓN: Si habla español, tiene a krause disposición servicios gratuitos de asistencia lingüística. [facility] [phone].    We comply with applicable federal civil rights laws and [place] laws. We do not discriminate on the basis of race, color, national origin, age, disability, sex, sexual orientation, or gender identity.            Thank you!     Thank you for choosing [facility]  for your care. Our goal is always to provide you with excellent care. Hearing back from our patients is one way we can continue to improve our services. Please take a few minutes to complete the written survey that you may receive in the mail after your visit with us. Thank you!             Your Updated Medication List - Protect others around you: Learn how to safely use, store and throw away your medicines at www.disposemymeds.org.          This list is accurate as of 5/24/18 10:18 AM.  Always use your most recent med list.                   Brand Name Dispense Instructions for use Diagnosis    albuterol 108 (90 Base) MCG/ACT Inhaler    PROVENTIL HFA    1 Inhaler    Inhale 2 puffs into the lungs every 6 hours    Chest tightness       amLODIPine 10 MG tablet    NORVASC    90 tablet    Take 1 tablet (10 mg) by mouth daily    Essential hypertension       Calcium + D3 600-200 MG-UNIT Tabs      Take by mouth daily         cetirizine 10 MG tablet    zyrTEC    90 tablet    Take 1 tablet (10 mg) by mouth every evening    Chronic seasonal allergic rhinitis, unspecified trigger       GINKGO BILOBA COMPLEX PO      Take by mouth daily        methylPREDNISolone 4 MG tablet    MEDROL DOSEPAK    21 tablet    Follow package instructions    Chronic seasonal allergic rhinitis, unspecified trigger

## 2018-05-25 LAB
HCV AB SERPL QL IA: NONREACTIVE
HIV 1+2 AB+HIV1 P24 AG SERPL QL IA: NONREACTIVE

## 2018-07-16 ENCOUNTER — SURGERY (OUTPATIENT)
Age: 58
End: 2018-07-16

## 2018-07-16 ENCOUNTER — HOSPITAL ENCOUNTER (OUTPATIENT)
Facility: CLINIC | Age: 58
Discharge: HOME | End: 2018-07-16
Attending: INTERNAL MEDICINE | Admitting: INTERNAL MEDICINE
Payer: COMMERCIAL

## 2018-07-16 ENCOUNTER — OFFICE VISIT (OUTPATIENT)
Dept: INTERPRETER SERVICES | Facility: CLINIC | Age: 58
End: 2018-07-16
Payer: COMMERCIAL

## 2018-07-16 VITALS
HEIGHT: 69 IN | RESPIRATION RATE: 10 BRPM | OXYGEN SATURATION: 100 % | BODY MASS INDEX: 26.66 KG/M2 | DIASTOLIC BLOOD PRESSURE: 95 MMHG | SYSTOLIC BLOOD PRESSURE: 133 MMHG | WEIGHT: 180 LBS

## 2018-07-16 LAB — COLONOSCOPY: NORMAL

## 2018-07-16 PROCEDURE — T1013 SIGN LANG/ORAL INTERPRETER: HCPCS | Mod: U3

## 2018-07-16 PROCEDURE — 45378 DIAGNOSTIC COLONOSCOPY: CPT | Performed by: INTERNAL MEDICINE

## 2018-07-16 PROCEDURE — G0121 COLON CA SCRN NOT HI RSK IND: HCPCS | Performed by: INTERNAL MEDICINE

## 2018-07-16 PROCEDURE — 25000128 H RX IP 250 OP 636: Performed by: INTERNAL MEDICINE

## 2018-07-16 PROCEDURE — G0500 MOD SEDAT ENDO SERVICE >5YRS: HCPCS | Performed by: INTERNAL MEDICINE

## 2018-07-16 RX ORDER — ONDANSETRON 2 MG/ML
4 INJECTION INTRAMUSCULAR; INTRAVENOUS
Status: DISCONTINUED | OUTPATIENT
Start: 2018-07-16 | End: 2018-07-16 | Stop reason: HOSPADM

## 2018-07-16 RX ORDER — LIDOCAINE 40 MG/G
CREAM TOPICAL
Status: DISCONTINUED | OUTPATIENT
Start: 2018-07-16 | End: 2018-07-16 | Stop reason: HOSPADM

## 2018-07-16 RX ORDER — FENTANYL CITRATE 50 UG/ML
INJECTION, SOLUTION INTRAMUSCULAR; INTRAVENOUS PRN
Status: DISCONTINUED | OUTPATIENT
Start: 2018-07-16 | End: 2018-07-16 | Stop reason: HOSPADM

## 2018-07-16 RX ADMIN — FENTANYL CITRATE 100 MCG: 50 INJECTION, SOLUTION INTRAMUSCULAR; INTRAVENOUS at 14:39

## 2018-07-16 RX ADMIN — MIDAZOLAM 2 MG: 1 INJECTION INTRAMUSCULAR; INTRAVENOUS at 14:37

## 2018-11-15 ENCOUNTER — OFFICE VISIT (OUTPATIENT)
Dept: FAMILY MEDICINE | Facility: CLINIC | Age: 58
End: 2018-11-15
Payer: COMMERCIAL

## 2018-11-15 VITALS
HEIGHT: 69 IN | TEMPERATURE: 98 F | WEIGHT: 195 LBS | SYSTOLIC BLOOD PRESSURE: 128 MMHG | HEART RATE: 77 BPM | BODY MASS INDEX: 28.88 KG/M2 | DIASTOLIC BLOOD PRESSURE: 85 MMHG | OXYGEN SATURATION: 97 %

## 2018-11-15 DIAGNOSIS — M25.561 CHRONIC PAIN OF RIGHT KNEE: ICD-10-CM

## 2018-11-15 DIAGNOSIS — G89.29 CHRONIC PAIN OF RIGHT KNEE: ICD-10-CM

## 2018-11-15 DIAGNOSIS — E78.5 HYPERLIPIDEMIA LDL GOAL <100: ICD-10-CM

## 2018-11-15 DIAGNOSIS — G47.33 OSA (OBSTRUCTIVE SLEEP APNEA): Primary | ICD-10-CM

## 2018-11-15 PROCEDURE — 99214 OFFICE O/P EST MOD 30 MIN: CPT | Performed by: INTERNAL MEDICINE

## 2018-11-15 RX ORDER — SIMVASTATIN 5 MG
5 TABLET ORAL AT BEDTIME
Qty: 90 TABLET | Refills: 3 | Status: SHIPPED | OUTPATIENT
Start: 2018-11-15 | End: 2018-11-21

## 2018-11-15 NOTE — MR AVS SNAPSHOT
After Visit Summary   11/15/2018    Drew Bradford    MRN: [id number]           Patient Information     Date Of Birth          1960        Visit Information        Provider Department      11/15/2018 4:25 PM Tara Mehta MD; [facility] [facility]        Today's Diagnoses     AWA (obstructive sleep apnea)    -  1    Hyperlipidemia LDL goal <100        Chronic pain of right knee           Follow-ups after your visit        Additional Services     SLEEP EVALUATION & MANAGEMENT REFERRAL - ADULT [facility] [phone]  (Age 18 and up)       Please be aware that coverage of these services is subject to the terms and limitations of your health insurance plan.  Call member services at your health plan with any benefit or coverage questions.      Please bring the following to your appointment:    >>   List of current medications   >>   This referral request   >>   Any documents/labs given to you for this referral                      Follow-up notes from your care team     Return in about 1 year (around 11/15/2019).      Future tests that were ordered for you today     Open Future Orders        Priority Expected Expires Ordered    SLEEP EVALUATION & MANAGEMENT REFERRAL - [facility] [phone]  (Age 18 and up) Routine  11/15/2019 11/15/2018            Who to contact     If you have questions or need follow up information about today's clinic visit or your schedule please contact [facility] directly at [phone].  Normal or non-critical lab and imaging results will be communicated to you by MyChart, letter or phone within 4 business days after the clinic has received the results. If you do not hear from us within 7 days, please contact the clinic through MyChart or phone. If you have a critical or abnormal lab result, we will notify you by phone as soon as possible.  Submit refill requests through  [facility] or call your pharmacy and they will forward the refill request to us. Please allow 3 business days for your refill to be completed.          Additional Information About Your Visit        MyChart Information     [employer] gives you secure access to your electronic health record. If you see a primary care provider, you can also send messages to your care team and make appointments. If you have questions, please call your primary care clinic.  If you do not have a primary care provider, please call [phone] and they will assist you.        Care EveryWhere ID     This is your Care EveryWhere ID. This could be used by other organizations to access your [place] medical records  [id number]        Your Vitals Were     Pulse Temperature Height Pulse Oximetry BMI (Body Mass Index)       77 98  F (36.7  C) (Oral) 5' 9\" (1.753 m) 97% 28.8 kg/m2        Blood Pressure from Last 3 Encounters:   11/15/18 128/85   07/16/18 (!) 133/95   05/24/18 125/83    Weight from Last 3 Encounters:   11/15/18 195 lb (88.5 kg)   07/16/18 180 lb (81.6 kg)   05/24/18 183 lb 12.8 oz (83.4 kg)                 Today's Medication Changes          These changes are accurate as of 11/15/18  4:59 PM.  If you have any questions, ask your nurse or doctor.               Start taking these medicines.        Dose/Directions    simvastatin 5 MG tablet   Commonly known as:  ZOCOR   Used for:  Hyperlipidemia LDL goal <100   Started by:  Tara Mehta MD        Dose:  5 mg   Take 1 tablet (5 mg) by mouth At Bedtime   Quantity:  90 tablet   Refills:  3            Where to get your medicines      Some of these will need a paper prescription and others can be bought over the counter.  Ask your nurse if you have questions.     Bring a paper prescription for each of these medications     simvastatin 5 MG tablet                Primary Care Provider Office Phone # Fax #    Tara Mehta [phone] [address]  [facility] [address]        Equal Access to Services     [facility] MARK : Hadii juan ramon ku hadlaurio Sobrindaali, waaxda luqadaha, qaybta kaalmaguillermo garciaansonfranklin light. So [facility] [phone].    ATENCIÓN: Si habla español, tiene a krause disposición servicios gratuitos de asistencia lingüística. Davidame al [phone].    We comply with applicable federal civil rights laws and [place] laws. We do not discriminate on the basis of race, color, national origin, age, disability, sex, sexual orientation, or gender identity.            Thank you!     Thank you for choosing [facility]  for your care. Our goal is always to provide you with excellent care. Hearing back from our patients is one way we can continue to improve our services. Please take a few minutes to complete the written survey that you may receive in the mail after your visit with us. Thank you!             Your Updated Medication List - Protect others around you: Learn how to safely use, store and throw away your medicines at www.disposemymeds.org.          This list is accurate as of 11/15/18  4:59 PM.  Always use your most recent med list.                   Brand Name Dispense Instructions for use Diagnosis    albuterol 108 (90 Base) MCG/ACT inhaler    PROVENTIL HFA    1 Inhaler    Inhale 2 puffs into the lungs every 6 hours    Chest tightness       amLODIPine 10 MG tablet    NORVASC    90 tablet    Take 1 tablet (10 mg) by mouth daily    Essential hypertension       Calcium + D3 600-200 MG-UNIT Tabs      Take by mouth daily        cetirizine 10 MG tablet    zyrTEC    90 tablet    Take 1 tablet (10 mg) by mouth every evening    Chronic seasonal allergic rhinitis, unspecified trigger       GINKGO BILOBA COMPLEX PO      Take by mouth daily        simvastatin 5 MG tablet    ZOCOR    90 tablet    Take 1 tablet (5 mg) by mouth At Bedtime    Hyperlipidemia LDL goal <100

## 2018-11-15 NOTE — PROGRESS NOTES
SUBJECTIVE:   Drew Bradford is a 58 year old male who presents to clinic today for the following health issues:      AWA, Sleep problems      HPI:   Patient Drew Bradford is a very pleasant 58 year old male with history of AWA, hyperlipidemia who presents to Internal Medicine clinic today for evaluation of chronic hyperlipidemia and AWA. Regarding the patient's obstructive sleep apnea, the patient reports chronic primary snoring problems. He has never had a sleep study and is not currently on any CPAP therapy. He is interested in an evaluation by the [facility] going forward including a possible sleep study. Regarding the patient's hyperlipidemia, the patient is not currently on any cholesterol medication and he is interested in starting a cholesterol medication at this time.          Current Medications:     Current Outpatient Prescriptions   Medication Sig Dispense Refill     amLODIPine (NORVASC) 10 MG tablet Take 1 tablet (10 mg) by mouth daily 90 tablet 3     Calcium Carb-Cholecalciferol (CALCIUM + D3) 600-200 MG-UNIT TABS Take by mouth daily       cetirizine (ZYRTEC) 10 MG tablet Take 1 tablet (10 mg) by mouth every evening 90 tablet 3     GINKGO BILOBA COMPLEX PO Take by mouth daily       simvastatin (ZOCOR) 5 MG tablet Take 1 tablet (5 mg) by mouth At Bedtime 90 tablet 3     albuterol (PROVENTIL HFA) 108 (90 BASE) MCG/ACT Inhaler Inhale 2 puffs into the lungs every 6 hours (Patient not taking: Reported on 5/17/2018) 1 Inhaler 0         Allergies:      Allergies   Allergen Reactions     Penicillins      Amoxicillin Rash     Back pain, kidney pain.             Past Medical History:     Past Medical History:   Diagnosis Date     HTN (hypertension)      Presbyopia      Seasonal allergies          Past Surgical History:     Past Surgical History:   Procedure Laterality Date     C REMOVAL OF KIDNEY STONE       COLONOSCOPY N/A 7/16/2018    Procedure: COLONOSCOPY;  colonoscopy;  Surgeon: Em  "Geremias Urbina MD;  Location: [facility]         Family Medical History:   No family history on file.      Social History:     Social History     Social History     Marital status: Single     Spouse name: N/A     Number of children: N/A     Years of education: N/A     Occupational History     Not on file.     Social History Main Topics     Smoking status: Never Smoker     Smokeless tobacco: Never Used     Alcohol use Yes      Comment: 2 beers per month     Drug use: No     Sexual activity: No     Other Topics Concern     Not on file     Social History Narrative           Review of System:     Constitutional: Negative for fever or chills  Skin: Negative for rashes  Ears/Nose/Throat: Negative for nasal congestion, sore throat  Respiratory: No shortness of breath, dyspnea on exertion, cough, or hemoptysis  Cardiovascular: Negative for chest pain  Gastrointestinal: Negative for nausea, vomiting  Genitourinary: Negative for dysuria, hematuria  Musculoskeletal: Negative for myalgias  Neurologic: Negative for headaches  Psychiatric: Negative for depression, anxiety  Hematologic/Lymphatic/Immunologic: Negative  Endocrine: Negative  Behavioral: Negative for tobacco use       Physical Exam:   /85 (BP Location: Left arm, Cuff Size: Adult Large)  Pulse 77  Temp 98  F (36.7  C) (Oral)  Ht 5' 9\" (1.753 m)  Wt 195 lb (88.5 kg)  SpO2 97%  BMI 28.8 kg/m2    GENERAL: alert and no distress  EYES: eyes grossly normal to inspection, and conjunctivae and sclerae normal  HENT: Normocephalic atraumatic. Nose and mouth without ulcers or lesions  NECK: supple  RESP: lungs clear to auscultation   CV: regular rate and rhythm, normal S1 S2  LYMPH: no peripheral edema   ABDOMEN: nondistended  MS: no gross musculoskeletal defects noted  SKIN: no suspicious lesions or rashes  NEURO: Alert & Oriented x 3.   PSYCH: mentation appears normal, affect normal        Diagnostic Test Results:     Diagnostic Test Results:  Results for orders placed " or performed during the hospital encounter of 07/16/18   COLONOSCOPY   Result Value Ref Range    COLONOSCOPY       [facility] Endoscopy Department  _______________________________________________________________________________  Patient Name: Drew Bradford              Procedure Date: 7/16/2018 2:19 PM  MRN: [id number]                       Account Number: [id number]  YOB: 1960               Admit Type: Outpatient  Age: 57                               Room: 1                          Note Status: Finalized                Attending MD: Geremias Strickland MD  Total Sedation Time:                  Instrument Name: 303 PCF- Colonoscope  _______________________________________________________________________________     Procedure:                Colonoscopy  Indications:              Screening for colorectal malignant neoplasm  Providers:                Greemias Strickland MD, Candice Wells RN  Referring MD:             Tara Mehta MD  Medicines:                Midazolam 2 mg IV, Fentanyl 100 micrograms IV  Complications:            No immediate complications.  ___ ____________________________________________________________________________  Procedure:                Pre-Anesthesia Assessment:                            - Prior to the procedure, a History and Physical                             was performed, and patient medications and                             allergies were reviewed. The patient is competent.                             The risks and benefits of the procedure and the                             sedation options and risks were discussed with the                             patient. All questions were answered and informed                             consent was obtained. Patient identification and                             proposed procedure were verified by the physician                             in the pre-procedure area. Mental Status                              Examination: alert and oriented. Airway                             Examination: normal oropharyngeal airway and neck                             mobility. Re spiratory Examination: clear to                             auscultation. CV Examination: normal. Prophylactic                             Antibiotics: The patient does not require                             prophylactic antibiotics. Prior Anticoagulants: The                             patient has taken no previous anticoagulant or                             antiplatelet agents. ASA Grade Assessment: II - A                             patient with mild systemic disease. After reviewing                             the risks and benefits, the patient was deemed in                             satisfactory condition to undergo the procedure.                             The anesthesia plan was to use moderate sedation /                             analgesia (conscious sedation). Immediately prior                             to administration of medications, the patient was                             re-assessed for adequacy to receive sedatives. The                             heart rat e, respiratory rate, oxygen saturations,                             blood pressure, adequacy of pulmonary ventilation,                             and response to care were monitored throughout the                             procedure. The physical status of the patient was                             re-assessed after the procedure.                            After obtaining informed consent, the colonoscope                             was passed under direct vision. Throughout the                             procedure, the patient's blood pressure, pulse, and                             oxygen saturations were monitored continuously. The                             Colonoscope was introduced through the anus and                             advanced to the cecum,  identified by the                             appendiceal orifice, ileocecal valve and palpation.                             The ileocecal valve, appendiceal orifice, and                             rectum were photographe d.                                                                                   Findings:       The perianal and digital rectal examinations were normal.       The terminal ileum appeared normal.       The colon (entire examined portion) appeared normal.       Internal Hemorrhoids seen on retroflexion       Multiple small and large-mouthed diverticula were found in the sigmoid        colon and descending colon.       Internal Hemorrhoids seen on retroflexion                                                                                   Impression:               - The examined portion of the ileum was normal.                            - The entire examined colon is normal.                            - Diverticulosis in the sigmoid colon and in the                             descending colon.                            - No specimens collected.  Recommendation:           - Please continue to follow with Primary care                             Physician.                             - High Fiber Diet                            - Repeat colonoscopy in 10 years for screening                             purposes.                                                                                   Procedure Code(s):       --- Professional ---       04608, Colonoscopy, flexible; diagnostic, including collection of        specimen(s) by brushing or washing, when performed (separate procedure)  Diagnosis Code(s):       --- Professional ---       Z12.11, Encounter for screening for malignant neoplasm of colon       K57.30, Diverticulosis of large intestine without perforation or abscess        without bleeding    CPT copyright 2017 American Medical Association. All rights reserved.    The codes  documented in this report are preliminary and upon  review may   be revised to meet current compliance requirements.    __________________  Geremias Strickland MD  7/16/2018 3:17:16 PM  I was physically present for the entire viewing portion of the exam.  MD Blade Azule r of Addenda: 0    Note Initiated On: 7/16/2018 2:19 PM  MRN:                      [id number]  Procedure Date:           7/16/2018 2:19:56 PM  Scope Withdrawal Time: 0 hours 11 minutes 20 seconds   Total Procedure Duration: 0 hours 13 minutes 13 seconds   Estimated Blood Loss:       Scope In: 2:42:00 PM  Scope Out: 2:55:13 PM         ASSESSMENT/PLAN:       (G47.33) AWA (obstructive sleep apnea)  (primary encounter diagnosis)  Comment: chronic AWA, not well controlled  Plan: SLEEP EVALUATION & MANAGEMENT REFERRAL - [facility]         [phone]  (Age 18 and up)      (E78.5) Hyperlipidemia LDL goal <100  Comment: hyperlipidemia not well controlled  Plan: simvastatin (ZOCOR) 5 MG tablet        Follow Up Plan:     Patient is instructed to return to Internal Medicine clinic for follow-up visit in 1 year.        Tara Mehta MD  Internal Medicine  [facility]

## 2018-11-21 ENCOUNTER — MYC MEDICAL ADVICE (OUTPATIENT)
Dept: FAMILY MEDICINE | Facility: CLINIC | Age: 58
End: 2018-11-21

## 2018-11-21 DIAGNOSIS — E78.5 HYPERLIPIDEMIA LDL GOAL <100: ICD-10-CM

## 2018-11-21 RX ORDER — SIMVASTATIN 5 MG
5 TABLET ORAL AT BEDTIME
Qty: 90 TABLET | Refills: 3 | Status: SHIPPED | OUTPATIENT
Start: 2018-11-21 | End: 2019-05-22

## 2018-11-21 NOTE — TELEPHONE ENCOUNTER
Rx was local print and patient didn't get it.  I sent to his preferred pharmacy  Tosin Silva RN- Triage [employer]

## 2018-12-27 ENCOUNTER — OFFICE VISIT (OUTPATIENT)
Dept: SLEEP MEDICINE | Facility: CLINIC | Age: 58
End: 2018-12-27
Attending: INTERNAL MEDICINE
Payer: COMMERCIAL

## 2018-12-27 VITALS
TEMPERATURE: 97.9 F | BODY MASS INDEX: 29.03 KG/M2 | SYSTOLIC BLOOD PRESSURE: 149 MMHG | RESPIRATION RATE: 16 BRPM | HEART RATE: 72 BPM | WEIGHT: 196 LBS | DIASTOLIC BLOOD PRESSURE: 96 MMHG | OXYGEN SATURATION: 95 % | HEIGHT: 69 IN

## 2018-12-27 DIAGNOSIS — G47.19 EXCESSIVE DAYTIME SLEEPINESS: ICD-10-CM

## 2018-12-27 DIAGNOSIS — R29.818 SUSPECTED SLEEP APNEA: Primary | ICD-10-CM

## 2018-12-27 DIAGNOSIS — G47.33 OSA (OBSTRUCTIVE SLEEP APNEA): ICD-10-CM

## 2018-12-27 DIAGNOSIS — R06.83 SNORING: ICD-10-CM

## 2018-12-27 DIAGNOSIS — R06.81 WITNESSED APNEIC SPELLS: ICD-10-CM

## 2018-12-27 PROCEDURE — 99204 OFFICE O/P NEW MOD 45 MIN: CPT | Performed by: INTERNAL MEDICINE

## 2018-12-27 ASSESSMENT — MIFFLIN-ST. JEOR: SCORE: 1692.81

## 2018-12-27 NOTE — PATIENT INSTRUCTIONS
Your BMI is Body mass index is 29.03 kg/m .  Weight management is a personal decision.  If you are interested in exploring weight loss strategies, the following discussion covers the approaches that may be successful. Body mass index (BMI) is one way to tell whether you are at a healthy weight, overweight, or obese. It measures your weight in relation to your height.  A BMI of 18.5 to 24.9 is in the healthy range. A person with a BMI of 25 to 29.9 is considered overweight, and someone with a BMI of 30 or greater is considered obese. More than two-thirds of American adults are considered overweight or obese.  Being overweight or obese increases the risk for further weight gain. Excess weight may lead to heart disease and diabetes.  Creating and following plans for healthy eating and physical activity may help you improve your health.  Weight control is part of healthy lifestyle and includes exercise, emotional health, and healthy eating habits. Careful eating habits lifelong are the mainstay of weight control. Though there are significant health benefits from weight loss, long-term weight loss with diet alone may be very difficult to achieve- studies show long-term success with dietary management in less than 10% of people. Attaining a healthy weight may be especially difficult to achieve in those with severe obesity. In some cases, medications, devices and surgical management might be considered.  What can you do?  If you are overweight or obese and are interested in methods for weight loss, you should discuss this with your provider.     Consider reducing daily calorie intake by 500 calories.     Keep a food journal.     Avoiding skipping meals, consider cutting portions instead.    Diet combined with exercise helps maintain muscle while optimizing fat loss. Strength training is particularly important for building and maintaining muscle mass. Exercise helps reduce stress, increase energy, and improves fitness.  Increasing exercise without diet control, however, may not burn enough calories to loose weight.       Start walking three days a week 10-20 minutes at a time    Work towards walking thirty minutes five days a week     Eventually, increase the speed of your walking for 1-2 minutes at time    In addition, we recommend that you review healthy lifestyles and methods for weight loss available through the National Institutes of Health patient information sites:  http://win.niddk.nih.gov/publications/index.htm    And look into health and wellness programs that may be available through your health insurance provider, employer, local community center, or [employer].    Weight management plan: Patient was referred to their PCP to discuss a diet and exercise plan.

## 2018-12-27 NOTE — NURSING NOTE
"Chief Complaint   Patient presents with     Sleep Problem     Snoring, stop breathing       Initial BP (!) 149/96   Pulse 72   Temp 97.9  F (36.6  C) (Oral)   Resp 16   Ht 1.75 m (5' 8.9\")   Wt 88.9 kg (196 lb)   SpO2 95%   BMI 29.03 kg/m   Estimated body mass index is 29.03 kg/m  as calculated from the following:    Height as of this encounter: 1.75 m (5' 8.9\").    Weight as of this encounter: 88.9 kg (196 lb).    Medication Reconciliation: complete    Neck circumference: 16 inches / 41 centimeters.    ESS 16    Manisha Romano MA      "

## 2018-12-27 NOTE — PROGRESS NOTES
Sleep Consultation:    Date on this visit: 12/27/2018    Drew Bradford is sent by Tara Mehta for a sleep consultation regarding possible sleep apnea.    Primary Physician: Tara Mehta     Chief compliant: snoring, apneas in sleep    Presenting History:     Drew Bradford reports nightly snoring, gasping and poor quality of sleep for more than 10 years.     Patient was seen with a mandarin  and his daughter.     Drew does snore every night. Patient does have a regular bed partner. There is report of snoring, gasping, snorting and poor quality of sleep.  He does have witnessed apneas. They never sleep separately.  Patient sleeps on his back and side. He has frequent morning dry mouth, denies no morning headaches and restless legs. Drew denies any bruxism, sleep walking, sleep talking, dream enactment, sleep paralysis, cataplexy and hypnogogic/hypnopompic hallucinations.    Patient apparently had 2-3 episodes of urinary incontinence. He thinks that these are related to apnea episodes. History is negative for symptoms of nocturnal seizures. Since he changed his sleep position to his side, these have not occurred.     Drew goes to sleep at 10:00 PM during the week. He wakes up at 6:00 AM without an alarm. He falls asleep in 10 minutes.  Drew denies difficulty falling asleep.  He wakes up 3-4 times a night for 10 minutes before falling back to sleep.  Drew wakes up to go to the bathroom and uncertain reasons.  On weekends, Drew goes to sleep at 10:00 PM.  He wakes up at 8:00 AM without an alarm. He falls asleep in 10 minutes.  Patient gets an average of 8 hours of sleep per night.     Patient does not use electronics in bed.     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      He denies sleep walking as a child.  Drew denies difficulty breathing through his nose.       Patient's [place] Sleepiness score 16/24 consistent with moderate daytime sleepiness.      Drew naps 7  times per week for 10-20 minutes, feels refreshed after naps. He takes some inadvertant naps.  He denies closing eyes, dozing and falling asleep while driving.  Patient was counseled on the importance of driving while alert, to pull over if drowsy, or nap before getting into the vehicle if sleepy.      He uses 1 cups/day of coffee. Last caffeine intake is usually before 10 am.    Allergies:    Allergies   Allergen Reactions     Penicillins      Amoxicillin Rash     Back pain, kidney pain.        Medications:    Current Outpatient Medications   Medication Sig Dispense Refill     amLODIPine (NORVASC) 10 MG tablet Take 1 tablet (10 mg) by mouth daily 90 tablet 3     Calcium Carb-Cholecalciferol (CALCIUM + D3) 600-200 MG-UNIT TABS Take by mouth daily       GINKGO BILOBA COMPLEX PO Take by mouth daily       simvastatin (ZOCOR) 5 MG tablet Take 1 tablet (5 mg) by mouth At Bedtime 90 tablet 3     albuterol (PROVENTIL HFA) 108 (90 BASE) MCG/ACT Inhaler Inhale 2 puffs into the lungs every 6 hours (Patient not taking: Reported on 5/17/2018) 1 Inhaler 0     cetirizine (ZYRTEC) 10 MG tablet Take 1 tablet (10 mg) by mouth every evening (Patient not taking: Reported on 12/27/2018) 90 tablet 3       Problem List:  Patient Active Problem List    Diagnosis Date Noted     Hyperlipidemia LDL goal <100 11/15/2018     Priority: Medium        Past Medical/Surgical History:  Past Medical History:   Diagnosis Date     HTN (hypertension)      Presbyopia      Seasonal allergies      Past Surgical History:   Procedure Laterality Date     C REMOVAL OF KIDNEY STONE       COLONOSCOPY N/A 7/16/2018    Procedure: COLONOSCOPY;  colonoscopy;  Surgeon: Geremias Strickland MD;  Location: [facility]       Social History:  Social History     Socioeconomic History     Marital status: Single     Spouse name: Not on file     Number of children: Not on file     Years of education: Not on file     Highest education level: Not on file   Social Needs      Financial resource strain: Not on file     Food insecurity - worry: Not on file     Food insecurity - inability: Not on file     Transportation needs - medical: Not on file     Transportation needs - non-medical: Not on file   Occupational History     Not on file   Tobacco Use     Smoking status: Never Smoker     Smokeless tobacco: Never Used   Substance and Sexual Activity     Alcohol use: Yes     Comment: 2 beers per month     Drug use: No     Sexual activity: No   Other Topics Concern     Parent/sibling w/ CABG, MI or angioplasty before 65F 55M? Not Asked   Social History Narrative     Not on file       Family History:    Brother has sleep apnea symptoms.     Review of Systems:  A complete review of systems reviewed by me is negative with the exeption of what has been mentioned in the history of present illness.  CONSTITUTIONAL: NEGATIVE for weight gain/loss, fever, chills, sweats or night sweats, drug allergies.  EYES: NEGATIVE for changes in vision, blind spots, double vision.  ENT: NEGATIVE for ear pain, sore throat, sinus pain, post-nasal drip, runny nose, bloody nose  CARDIAC: NEGATIVE for fast heartbeats or fluttering in chest, chest pain or pressure, breathlessness when lying flat, swollen legs or swollen feet.  NEUROLOGIC: NEGATIVE headaches, weakness or numbness in the arms or legs.  DERMATOLOGIC: NEGATIVE for rashes, new moles or change in mole(s)  PULMONARY: NEGATIVE SOB at rest, SOB with activity, dry cough, productive cough, coughing up blood, wheezing or whistling when breathing.    GASTROINTESTINAL: NEGATIVE for nausea or vomitting, loose or watery stools, fat or grease in stools, constipation, abdominal pain, bowel movements black in color or blood noted.  GENITOURINARY: NEGATIVE for pain during urination, blood in urine, urinating more frequently than usual, irregular menstrual periods.  MUSCULOSKELETAL: NEGATIVE for muscle pain, bone or joint pain, swollen joints.  ENDOCRINE: NEGATIVE for  "increased thirst or urination, diabetes.  LYMPHATIC: NEGATIVE for swollen lymph nodes, lumps or bumps in the breasts or nipple discharge.    Physical Examination:  Vitals: BP (!) 149/96   Pulse 72   Temp 97.9  F (36.6  C) (Oral)   Resp 16   Ht 1.75 m (5' 8.9\")   Wt 88.9 kg (196 lb)   SpO2 95%   BMI 29.03 kg/m    BMI= Body mass index is 29.03 kg/m .         [place] Total Score 12/27/2018   Total score - Maiden 16       AMALIA Total Score: 10 (12/27/18 1023)    GENERAL APPEARANCE: healthy, alert and no distress  EYES: Eyes grossly normal to inspection, PERRL and conjunctivae and sclerae normal  HENT: nose and mouth without ulcers or lesions, oropharynx crowded, uvula elongated, soft palate dependent and tongue base enlarged  NECK: no adenopathy, no asymmetry, masses, or scars and thyroid normal to palpation  RESP: lungs clear to auscultation - no rales, rhonchi or wheezes  CV: regular rates and rhythm, normal S1 S2, no S3 or S4 and no murmur, click or rub  ABDOMEN: soft, nontender, without hepatosplenomegaly or masses  MS: extremities normal- no gross deformities noted  NEURO: Normal strength and tone, mentation intact and speech normal  PSYCH: mentation appears normal and affect normal/bright  Mallampati Class: IV.  Tonsillar Stage: 1  hidden by pillars.    Impression/Plan:    1. Suspected sleep apnea  2. Hypertension   3. Excessive daytime sleepiness     - Patient has a high risk for sleep apnea considering history of snoring, witnessed apneas and daytime sleepiness. Oropharynx is crowded on examination.BMI is 29 and neck circumference 41 cm. Medical comorbidity includes hypertension. An overnight sleep study is recommended for evaluation of sleep apnea.     Plan:     1. Split night PSG for assessment of sleep apnea       He will follow up with me in approximately two weeks after his sleep study has been competed to review the results and discuss plan of care.       Polysomnography reviewed.  Obstructive sleep " apnea reviewed.  Complications of untreated sleep apnea were reviewed.    I spent a total of 45 minutes with patient with more than 50% in counseling     Dr. Anand Avelar     CC: Tara Mehta

## 2019-01-19 ENCOUNTER — THERAPY VISIT (OUTPATIENT)
Dept: SLEEP MEDICINE | Facility: CLINIC | Age: 59
End: 2019-01-19
Payer: COMMERCIAL

## 2019-01-19 DIAGNOSIS — G47.33 OSA (OBSTRUCTIVE SLEEP APNEA): ICD-10-CM

## 2019-01-19 DIAGNOSIS — G47.19 EXCESSIVE DAYTIME SLEEPINESS: ICD-10-CM

## 2019-01-19 DIAGNOSIS — R06.81 WITNESSED APNEIC SPELLS: ICD-10-CM

## 2019-01-19 DIAGNOSIS — R29.818 SUSPECTED SLEEP APNEA: ICD-10-CM

## 2019-01-19 DIAGNOSIS — R06.83 SNORING: ICD-10-CM

## 2019-01-19 PROCEDURE — 95811 POLYSOM 6/>YRS CPAP 4/> PARM: CPT | Performed by: INTERNAL MEDICINE

## 2019-01-20 NOTE — PROGRESS NOTES
Completed a split night PSG per provider order.    Preliminary AHI >30.  A final therapeutic PAP pressure was achieved.    Supine REM was seen on therapeutic pressure.    Patient reports feeling refreshed in AM.

## 2019-01-21 PROBLEM — G47.33 OSA (OBSTRUCTIVE SLEEP APNEA): Status: ACTIVE | Noted: 2019-01-21

## 2019-01-21 LAB — SLPCOMP: NORMAL

## 2019-01-21 NOTE — PROCEDURES
"SLEEP STUDY INTERPRETATION  SPLIT NIGHT STUDY      Patient: ADEOLA FOX  YOB: 1960  Study Date: 1/19/2019  MRN: [id number]  Referring Provider: MD Evans Xiaoyu  Ordering Provider: MD Avelar Rakesh    Indications for Polysomnography: The patient is a 58 y old Male who is 5' 9\" and weighs 196.0 lbs. His BMI is 29.0, [place] sleepiness scale 16.0 and neck circumference is 41.0 cm. Relevant medical history includes hypertension. A diagnostic polysomnogram was performed to evaluate for sleep apnea. After 178.0 minutes of sleep time the patient exhibited sufficient respiratory events qualifying him for a CPAP trial which was then initiated.    Polysomnogram Data: A full night polysomnogram recorded the standard physiologic parameters including EEG, EOG, EMG, ECG, nasal and oral airflow. Respiratory parameters of chest and abdominal movements were recorded with respiratory inductance plethysmography. Oxygen saturation was recorded by pulse oximetry.  Hypopnea scoring rule used: 1B 4%    Diagnostic PSG  Sleep Architecture: Sleep was fragmented.   The total recording time of the polysomnogram was 239.4 minutes. The total sleep time was 178.0 minutes. Sleep latency was decreased at 0.3 minutes without the use of a sleep aid. REM latency was 213.0 minutes. Arousal index was increased at 75.5 arousals per hour. Sleep efficiency was decreased at 74.4%. Wake after sleep onset was 50.5 minutes. The patient spent 34.3% of total sleep time in Stage N1, 59.0% in Stage N2, 0.0% in Stage N3, and 6.7% in REM. Time in REM supine was 12.0 minutes.    Respiration: Severe obstructive sleep apnea.     Events ? The polysomnogram revealed a presence of 125 obstructive, - central, and - mixed apneas resulting in an apnea index of 42.1 events per hour. There were 77 obstructive hypopneas and - central hypopneas resulting in an obstructive hypopnea index of 26.0 and central hypopnea index of - events per hour. The combined " apnea/hypopnea index was 68.1 events per hour (central apnea/hypopnea index was - events per hour).  The REM AHI was 40.0 events per hour. The supine AHI was 68.1 events per hour. The RERA index was 2.4 events per hour. The RDI was 70.4 events per hour.    Snoring - was reported as loud.    Respiratory rate and pattern - was notable for normal respiratory rate and pattern.    Sustained Sleep Associated Hypoventilation - Transcutaneous carbon dioxide monitoring was not used; however significant hypoventilation was not suggested by oximetry.    Sleep Associated Hypoxemia - (Greater than 5 minutes O2 sat at or below 88%) was present. Baseline oxygen saturation was 92.6%. Lowest oxygen saturation was 49.8%. Time spent less than or equal to 88% was 38.0 minutes. Time spent less than or equal to 89% was 43.9 minutes.     Treatment PSG  Sleep Architecture: Improved with reduction in arousals and increased REM.   At 02:26:25 AM the patient was placed on PAP treatment and was titrated at pressures ranging from CPAP 6 cmH2O up to CPAP 9 cmH2O. The total recording time of the treatment portion of the study was 228.9 minutes. The total sleep time was 205.0 minutes. During the treatment portion of the study the sleep latency was 10.0 minutes. REM latency was 50.0 minutes. Arousal index was normal at 15.5 arousals per hour. Sleep efficiency was normal at 89.5%. Wake after sleep onset was 12.0 minutes. The patient spent 9.0% of total sleep time in Stage N1, 25.1% in Stage N2, 30.0% in Stage N3, and 35.9% in REM. Time in REM supine was 73.5 minutes.     Respiration: CPAP effectively treated sleep apnea.     The final pressure was CPAP 9 cmH2O with an AHI of 0.8 events per hour. Time in REM supine on final pressure was 57.0 minutes.     This titration was considered :  - Optimal (residual AHI < 5 events per hour and including REM?supine sleep at final pressure).     Movement Activity: There was no significant movement abnormality.      Periodic Limb Movements  o During the diagnostic portion of the study, there were - PLMs recorded. The PLM index was - movements per hour. The PLM Arousal Index was - per hour.  o During the treatment portion of the study, there were - PLMs recorded. The PLM index was - movements per hour. The PLM Arousal Index was - per hour.    REM EMG Activity - Excessive transient/sustained muscle activity was not present.    Nocturnal Behavior - Abnormal sleep related behaviors were not noted during/arising out of NREM / REM sleep.     Bruxism - None apparent.    Cardiac Summary: Sinus rhythm.   During the diagnostic portion of the study, the average pulse rate was 62.1 bpm. The minimum pulse rate was 50.9 bpm while the maximum pulse rate was 92.1 bpm.    During the treatment portion of the study, the average pulse rate was 60.5 bpm. The minimum pulse rate was 53.9 bpm while the maximum pulse rate was 82.1 bpm.     Arrhythmias were not noted.    Assessment:     This sleep study shows severe obstructive sleep apnea and associated oxygen desaturations.     CPAP was effective in treating sleep apnea with an optimal pressure of 9 cm H2O.     Recommendations:    Treatment of AWA with Auto?titrating PAP therapy with a range of 9 cmH2O to 12 cmH2O. Recommend clinical follow up with sleep management team, including review of compliance measures.    Weight management.    Diagnostic Codes:   Obstructive Sleep Apnea G47.33  Sleep Hypoxemia G47.36   Repetitive Intrusions Into Sleep F51.8      1/19/2019 [facility] Sleep Study (196.0 lbs) - AHI 68.1, RDI 70.4, Supine AHI 68.1, REM AHI 40.0, Low O2% 49.8%, Time Spent ?88% 38.0, Time Spent ?89% 43.9. Treatment was titrated to a pressure of CPAP 9 with an AHI 0.8. Time spent in REM supine at this pressure was 57.0 minutes.    _____________________________________   Electronically Signed By: Anand Avelar MD 01/21/2019

## 2019-02-19 ENCOUNTER — TELEPHONE (OUTPATIENT)
Dept: SLEEP MEDICINE | Facility: CLINIC | Age: 59
End: 2019-02-19

## 2019-02-19 ENCOUNTER — OFFICE VISIT (OUTPATIENT)
Dept: SLEEP MEDICINE | Facility: CLINIC | Age: 59
End: 2019-02-19
Payer: COMMERCIAL

## 2019-02-19 VITALS
HEIGHT: 76 IN | DIASTOLIC BLOOD PRESSURE: 83 MMHG | WEIGHT: 192 LBS | OXYGEN SATURATION: 98 % | RESPIRATION RATE: 16 BRPM | HEART RATE: 66 BPM | SYSTOLIC BLOOD PRESSURE: 142 MMHG | BODY MASS INDEX: 23.38 KG/M2

## 2019-02-19 DIAGNOSIS — G47.33 OSA (OBSTRUCTIVE SLEEP APNEA): ICD-10-CM

## 2019-02-19 DIAGNOSIS — G47.33 OSA (OBSTRUCTIVE SLEEP APNEA): Primary | ICD-10-CM

## 2019-02-19 PROCEDURE — 99214 OFFICE O/P EST MOD 30 MIN: CPT | Performed by: INTERNAL MEDICINE

## 2019-02-19 ASSESSMENT — MIFFLIN-ST. JEOR: SCORE: 1780.92

## 2019-02-19 NOTE — NURSING NOTE
"Chief Complaint   Patient presents with     Study Results     PSG f/u       Initial /83   Pulse 66   Resp 16   Ht 1.92 m (6' 3.59\")   Wt 87.1 kg (192 lb)   SpO2 98%   BMI 23.62 kg/m   Estimated body mass index is 23.62 kg/m  as calculated from the following:    Height as of this encounter: 1.92 m (6' 3.59\").    Weight as of this encounter: 87.1 kg (192 lb).    Medication Reconciliation: complete     ESS 8  Ann Orozco        "

## 2019-02-19 NOTE — PROGRESS NOTES
Sleep Study Follow-Up Visit:    Date on this visit: 2/19/2019    Drew Bradford comes in today for follow-up of his sleep study done on 1/19/2019 at the [facility] for possible sleep apnea.    Sleep latency 0.3 minutes without Ambien.  REM achieved.   REM latency 213 minutes.  Sleep efficiency 74.4%. Total sleep time 178 minutes.    Sleep architecture:  Stage 1, 34.3% (5%), stage 2, 59% (45-55%), stage 3, 0% (15-20%), stage REM, 6.7% (20-25%).  AHI was 68, with desaturations. RDI 70.4.  REM RDI 40, consistent with severe REM AWA.  Supine RDI 70.4, consistent with severe SUPINE AWA.  Periodic Limb Movement Index -/hour.       CPAP titration:  Sleep latency 10 minutes.  REM latency 50 minutes.  Sleep efficiency 89.5%. Total sleep time 205 minutes. Sleep architecture:  Stage 1, 9% (5%), stage 2, 25% (45-55%), stage 3, 30% (15-20%), stage REM, 35.9% (20-25%).  CPAP was titrated to 9 cm with  elimination of apneas, hypopneas and desaturations. Supine REM was noted at this pressure.  Periodic Limb Movement Index -/hour.       These findings were reviewed with patient with an .     Past medical/surgical history, family history, social history, medications and allergies were reviewed.      Problem List:  Patient Active Problem List    Diagnosis Date Noted     AWA (obstructive sleep apnea) 01/21/2019     Priority: Medium     1/19/2019 [facility] Sleep Study (196.0 lbs) - AHI 68.1, RDI 70.4, Supine AHI 68.1, REM AHI 40.0, Low O2% 49.8%, Time Spent ?88% 38.0, Time Spent ?89% 43.9. Treatment was titrated to a pressure of CPAP 9 with an AHI 0.8. Time spent in REM supine at this pressure was 57.0 minutes.       Hyperlipidemia LDL goal <100 11/15/2018     Priority: Medium        Impression/Plan:    1. Obstructive sleep apnea, severe  2. Sleep related hypoxemia    - Patient was counseled regarding severe sleep apnea, consequences of untreated disease and management. CPAP therapy was recommended  which patient was willing to start.      Plan:     1. Start auto PAP therapy     He will follow up with me in about 7 week(s).     Twenty-five minutes spent with patient, all of which were spent face-to-face counseling, consulting, coordinating plan of care.      Dr. Anand Avelar     CC: Tara Mehta

## 2019-02-19 NOTE — PATIENT INSTRUCTIONS
Your BMI is Body mass index is 23.62 kg/m .  Weight management is a personal decision.  If you are interested in exploring weight loss strategies, the following discussion covers the approaches that may be successful. Body mass index (BMI) is one way to tell whether you are at a healthy weight, overweight, or obese. It measures your weight in relation to your height.  A BMI of 18.5 to 24.9 is in the healthy range. A person with a BMI of 25 to 29.9 is considered overweight, and someone with a BMI of 30 or greater is considered obese. More than two-thirds of American adults are considered overweight or obese.  Being overweight or obese increases the risk for further weight gain. Excess weight may lead to heart disease and diabetes.  Creating and following plans for healthy eating and physical activity may help you improve your health.  Weight control is part of healthy lifestyle and includes exercise, emotional health, and healthy eating habits. Careful eating habits lifelong are the mainstay of weight control. Though there are significant health benefits from weight loss, long-term weight loss with diet alone may be very difficult to achieve- studies show long-term success with dietary management in less than 10% of people. Attaining a healthy weight may be especially difficult to achieve in those with severe obesity. In some cases, medications, devices and surgical management might be considered.  What can you do?  If you are overweight or obese and are interested in methods for weight loss, you should discuss this with your provider.     Consider reducing daily calorie intake by 500 calories.     Keep a food journal.     Avoiding skipping meals, consider cutting portions instead.    Diet combined with exercise helps maintain muscle while optimizing fat loss. Strength training is particularly important for building and maintaining muscle mass. Exercise helps reduce stress, increase energy, and improves fitness.  Increasing exercise without diet control, however, may not burn enough calories to loose weight.       Start walking three days a week 10-20 minutes at a time    Work towards walking thirty minutes five days a week     Eventually, increase the speed of your walking for 1-2 minutes at time    In addition, we recommend that you review healthy lifestyles and methods for weight loss available through the National Institutes of Health patient information sites:  http://win.niddk.nih.gov/publications/index.htm    And look into health and wellness programs that may be available through your health insurance provider, employer, local community center, or [employer].

## 2019-02-19 NOTE — TELEPHONE ENCOUNTER
Called to schedule appt; however patient needs a Chinese . Told him that I will have an  call him tomorrow to schedule appt.

## 2019-02-20 ENCOUNTER — TELEPHONE (OUTPATIENT)
Dept: SLEEP MEDICINE | Facility: CLINIC | Age: 59
End: 2019-02-20

## 2019-02-20 DIAGNOSIS — G47.33 OSA (OBSTRUCTIVE SLEEP APNEA): ICD-10-CM

## 2019-02-20 NOTE — TELEPHONE ENCOUNTER
services left patient a vm to call [facility]'s main line to schedule appt.

## 2019-02-27 ENCOUNTER — DOCUMENTATION ONLY (OUTPATIENT)
Dept: SLEEP MEDICINE | Facility: CLINIC | Age: 59
End: 2019-02-27
Payer: COMMERCIAL

## 2019-02-27 DIAGNOSIS — G47.33 OSA (OBSTRUCTIVE SLEEP APNEA): ICD-10-CM

## 2019-02-27 NOTE — PROGRESS NOTES
Patient was offered choice of vendor and chose [facility].  Patient Drew Bradford was set up at [facility] on February 27, 2019. Patient received a [employer] Respironics DreamStation Auto. Pressures were set at 9-12 cm H2O.   Patient s ramp is 5 cm H2O for Auto and FLEX/EPR is A Flex, 2.  Patient received a Resmed Mask name: AIRFIT F20  Full Face mask size Medium, heated tubing and heated humidifier.  Patient is enrolled in the STM Program and does not need to meet compliance. Patient has a follow up on TBD   Jakob Solomon

## 2019-03-04 ENCOUNTER — DOCUMENTATION ONLY (OUTPATIENT)
Dept: SLEEP MEDICINE | Facility: CLINIC | Age: 59
End: 2019-03-04

## 2019-03-04 DIAGNOSIS — G47.33 OSA (OBSTRUCTIVE SLEEP APNEA): ICD-10-CM

## 2019-03-04 NOTE — PROGRESS NOTES
Pt's cpap device serial number was entered incorrectly. The correct serial number for the pt's respironics dreamstation is     [id number].    Serial number was updated in [employer] anywhere.

## 2019-03-04 NOTE — PROGRESS NOTES
3 DAY STM VISIT    Diagnostic AHI: 68.1   PSG    Patient contacted for 3 day STM visit  Message left for patient to return call     Device type: Auto-CPAP  PAP settings from order::  CPAP min 9 cm  H20       CPAP max 12 cm  H20         Mask type:    Full Face Mask     Device settings from machine       Assessment: No profile in [employer]view/[employer].  Interface error corrected.  Action plan: Pt to have f/u 14 day STM visit.  Patient has a follow up visit scheduled:   yes within 31-90 days of set up.

## 2019-03-14 ENCOUNTER — DOCUMENTATION ONLY (OUTPATIENT)
Dept: SLEEP MEDICINE | Facility: CLINIC | Age: 59
End: 2019-03-14
Payer: COMMERCIAL

## 2019-03-14 NOTE — PROGRESS NOTES
14 DAY STM VISIT    Diagnostic AHI: 68.1     PSG      Message left for patient to return call.     Assessment: Pt not meeting objective benchmarks for AHI and compliance       Action plan: Waiting for patient to return call.     Device type: Auto-CPAP    PAP settings: CPAP min 9 cm  H20       CPAP max 12 cm  H20         90th % [id number] cm  H20    Mask type:  Full Face Mask    Objective measures: 14 day rolling measures         Compliance  64 %     % of night spent in large leak  2 % last  upload      AHI 28.66   last  upload      Average number of minutes 253          Objective measure goal  Compliance   Goal >70%  Leak   Goal < 10%  AHI  Goal < 5  Usage  Goal >240

## 2019-03-28 DIAGNOSIS — G47.33 OSA (OBSTRUCTIVE SLEEP APNEA): Primary | ICD-10-CM

## 2019-04-03 ENCOUNTER — DOCUMENTATION ONLY (OUTPATIENT)
Dept: SLEEP MEDICINE | Facility: CLINIC | Age: 59
End: 2019-04-03

## 2019-04-03 DIAGNOSIS — G47.33 OSA (OBSTRUCTIVE SLEEP APNEA): ICD-10-CM

## 2019-04-03 NOTE — PROGRESS NOTES
30 DAY STM VISIT    Diagnostic AHI: 68.1   PSG      Subjective measures: Pt states things are going well and has no issues or complaints.  Pt is benefiting from therapy.        Assessment: Pt not meeting objective benchmarks for AHI Patient meeting subjective benchmarks.   Action plan: pt to have 6 month STM visit  Patient has not scheduled a follow up visit with Dr. Avelar not required by insurance.    Device type: Auto-CPAP  PAP settings: CPAP min 9 cm  H20     CPAP max 12 cm  H20         90th % yesccupi69.9 cm  H20    Mask type:  Nasal Mask    Objective measures: 14 day rolling measures         Compliance  78 %     % of night spent in large leak  0 % last  upload      AHI 29.51   last  upload      Average number of minutes 248          Objective measure goal  Compliance   Goal >70%  Leak   Goal < 10%  AHI  Goal < 5  Usage  Goal >240

## 2019-04-18 ENCOUNTER — DOCUMENTATION ONLY (OUTPATIENT)
Dept: SLEEP MEDICINE | Facility: CLINIC | Age: 59
End: 2019-04-18

## 2019-04-18 DIAGNOSIS — G47.33 OSA (OBSTRUCTIVE SLEEP APNEA): ICD-10-CM

## 2019-04-18 NOTE — PROGRESS NOTES
STM recheck data only     Diagnostic AHI: 68.1   PSG      Data only recheck     Assessment: Pt meeting objective benchmarks.       Action plan: pt to have 6 month STM visit    Device type: Auto-CPAP    PAP settings: CPAP min 9 cm  H20       CPAP max 12 cm  H20             90th % [id number] cm  H20    Mask type:  Nasal Mask    Objective measures: 14 day rolling measures         Compliance  85 %     % of night spent in large leak  0 % last  upload      AHI 1.47   last  upload      Average number of minutes 321          Objective measure goal  Compliance   Goal >70%  Leak   Goal < 10%  AHI  Goal < 5  Usage  Goal >240

## 2019-05-22 ENCOUNTER — OFFICE VISIT (OUTPATIENT)
Dept: FAMILY MEDICINE | Facility: CLINIC | Age: 59
End: 2019-05-22
Payer: COMMERCIAL

## 2019-05-22 VITALS
BODY MASS INDEX: 29.92 KG/M2 | TEMPERATURE: 98 F | HEIGHT: 69 IN | OXYGEN SATURATION: 94 % | WEIGHT: 202 LBS | SYSTOLIC BLOOD PRESSURE: 130 MMHG | HEART RATE: 67 BPM | DIASTOLIC BLOOD PRESSURE: 82 MMHG

## 2019-05-22 DIAGNOSIS — K59.00 CONSTIPATION, UNSPECIFIED CONSTIPATION TYPE: Primary | ICD-10-CM

## 2019-05-22 DIAGNOSIS — E78.5 HYPERLIPIDEMIA LDL GOAL <100: ICD-10-CM

## 2019-05-22 DIAGNOSIS — I10 ESSENTIAL HYPERTENSION: ICD-10-CM

## 2019-05-22 DIAGNOSIS — J30.2 CHRONIC SEASONAL ALLERGIC RHINITIS: ICD-10-CM

## 2019-05-22 PROCEDURE — 99214 OFFICE O/P EST MOD 30 MIN: CPT | Performed by: INTERNAL MEDICINE

## 2019-05-22 RX ORDER — SIMVASTATIN 5 MG
5 TABLET ORAL AT BEDTIME
Qty: 90 TABLET | Refills: 3 | Status: ON HOLD | OUTPATIENT
Start: 2019-05-22 | End: 2020-01-14

## 2019-05-22 RX ORDER — AMLODIPINE BESYLATE 10 MG/1
10 TABLET ORAL DAILY
Qty: 90 TABLET | Refills: 3 | Status: SHIPPED | OUTPATIENT
Start: 2019-05-22 | End: 2020-05-26

## 2019-05-22 RX ORDER — DOCUSATE SODIUM 100 MG/1
100 CAPSULE, LIQUID FILLED ORAL 2 TIMES DAILY PRN
Qty: 180 CAPSULE | Refills: 3 | Status: SHIPPED | OUTPATIENT
Start: 2019-05-22 | End: 2019-05-22

## 2019-05-22 RX ORDER — POLYETHYLENE GLYCOL 3350 17 G/17G
1 POWDER, FOR SOLUTION ORAL DAILY
Qty: 90 PACKET | Refills: 3 | Status: SHIPPED | OUTPATIENT
Start: 2019-05-22 | End: 2019-05-22

## 2019-05-22 RX ORDER — POLYETHYLENE GLYCOL 3350 17 G/17G
1 POWDER, FOR SOLUTION ORAL DAILY
Qty: 90 PACKET | Refills: 3 | Status: SHIPPED | OUTPATIENT
Start: 2019-05-22 | End: 2019-12-23

## 2019-05-22 RX ORDER — CETIRIZINE HYDROCHLORIDE 10 MG/1
10 TABLET ORAL EVERY EVENING
Qty: 90 TABLET | Refills: 3 | Status: SHIPPED | OUTPATIENT
Start: 2019-05-22 | End: 2019-12-23

## 2019-05-22 RX ORDER — DOCUSATE SODIUM 100 MG/1
100 CAPSULE, LIQUID FILLED ORAL 2 TIMES DAILY PRN
Qty: 180 CAPSULE | Refills: 3 | Status: SHIPPED | OUTPATIENT
Start: 2019-05-22 | End: 2019-12-23

## 2019-05-22 ASSESSMENT — MIFFLIN-ST. JEOR: SCORE: 1720.06

## 2019-05-22 NOTE — PROGRESS NOTES
Hypertension Follow-up      Do you check your blood pressure regularly outside of the clinic? Yes     Are you following a low salt diet? Yes    Are your blood pressures ever more than 140 on the top number (systolic) OR more   than 90 on the bottom number (diastolic), for example 140/90? No 128/81  No chest pain, headaches, fever or chills.          Current Medications:     Current Outpatient Medications   Medication Sig Dispense Refill     albuterol (PROVENTIL HFA) 108 (90 BASE) MCG/ACT Inhaler Inhale 2 puffs into the lungs every 6 hours 1 Inhaler 0     amLODIPine (NORVASC) 10 MG tablet Take 1 tablet (10 mg) by mouth daily 90 tablet 3     Calcium Carb-Cholecalciferol (CALCIUM + D3) 600-200 MG-UNIT TABS Take by mouth daily       cetirizine (ZYRTEC) 10 MG tablet Take 1 tablet (10 mg) by mouth every evening 90 tablet 3     docusate sodium (COLACE) 100 MG capsule Take 1 capsule (100 mg) by mouth 2 times daily as needed for constipation 180 capsule 3     GINKGO BILOBA COMPLEX PO Take by mouth daily       polyethylene glycol (MIRALAX/GLYCOLAX) packet Take 17 g by mouth daily 90 packet 3     simvastatin (ZOCOR) 5 MG tablet Take 1 tablet (5 mg) by mouth At Bedtime 90 tablet 3         Allergies:      Allergies   Allergen Reactions     Penicillins      Amoxicillin Rash     Back pain, kidney pain.             Past Medical History:     Past Medical History:   Diagnosis Date     HTN (hypertension)      Presbyopia      Seasonal allergies          Past Surgical History:     Past Surgical History:   Procedure Laterality Date     C REMOVAL OF KIDNEY STONE       COLONOSCOPY N/A 7/16/2018    Procedure: COLONOSCOPY;  colonoscopy;  Surgeon: Geremias Strickland MD;  Location: [facility]         Family Medical History:   No family history on file.      Social History:     Social History     Socioeconomic History     Marital status: Single     Spouse name: Not on file     Number of children: Not on file     Years of education: Not on file      Highest education level: Not on file   Occupational History     Not on file   Social Needs     Financial resource strain: Not on file     Food insecurity:     Worry: Not on file     Inability: Not on file     Transportation needs:     Medical: Not on file     Non-medical: Not on file   Tobacco Use     Smoking status: Never Smoker     Smokeless tobacco: Never Used   Substance and Sexual Activity     Alcohol use: Yes     Comment: 2 beers per month     Drug use: No     Sexual activity: Yes     Partners: Female   Lifestyle     Physical activity:     Days per week: Not on file     Minutes per session: Not on file     Stress: Not on file   Relationships     Social connections:     Talks on phone: Not on file     Gets together: Not on file     Attends [employer]ism service: Not on file     Active member of club or organization: Not on file     Attends meetings of clubs or organizations: Not on file     Relationship status: Not on file     Intimate partner violence:     Fear of current or ex partner: Not on file     Emotionally abused: Not on file     Physically abused: Not on file     Forced sexual activity: Not on file   Other Topics Concern     Parent/sibling w/ CABG, MI or angioplasty before 65F 55M? Not Asked   Social History Narrative     Not on file           Review of System:     Constitutional: Negative for fever or chills  Skin: Negative for rashes  Ears/Nose/Throat: positive for seasonal allergic rhinitis symptoms  Respiratory: No shortness of breath, dyspnea on exertion, cough, or hemoptysis  Cardiovascular: Negative for chest pain  Gastrointestinal: Negative for nausea, vomiting, positive for chronic mild constipation  Genitourinary: Negative for dysuria, hematuria  Musculoskeletal: Negative for myalgias  Neurologic: Negative for headaches  Psychiatric: Negative for depression, anxiety  Hematologic/Lymphatic/Immunologic: Negative  Endocrine: Negative  Behavioral: Negative for tobacco use       Physical Exam:  "  /82 (BP Location: Left arm, Patient Position: Sitting, Cuff Size: Adult Regular)   Pulse 67   Temp 98  F (36.7  C) (Oral)   Ht 1.75 m (5' 8.9\")   Wt 91.6 kg (202 lb)   SpO2 94%   BMI 29.92 kg/m      GENERAL: alert and no distress  EYES: eyes grossly normal to inspection, and conjunctivae and sclerae normal  HENT: Normocephalic atraumatic. Nose and mouth without ulcers or lesions, allergic rhinitis symptoms noted.   NECK: supple  RESP: lungs clear to auscultation   CV: regular rate and rhythm, normal S1 S2  LYMPH: no peripheral edema   ABDOMEN: nondistended  MS: no gross musculoskeletal defects noted  SKIN: no suspicious lesions or rashes  NEURO: Alert & Oriented x 3.   PSYCH: mentation appears normal, affect normal        Diagnostic Test Results:     Diagnostic Test Results:  Results for orders placed or performed in visit on 01/19/19   Comprehensive Sleep Study   Result Value Ref Range    SL[facility] Comprehensive Sleep      Narrative    Anand Avelar MD     1/21/2019 10:00 AM  SLEEP STUDY INTERPRETATION  SPLIT NIGHT STUDY      Patient: ADEOLA FOX  YOB: 1960  Study Date: 1/19/2019  MRN: [id number]  Referring Provider: MD Nathan Tara  Ordering Provider: MD Avelar Rakesh    Indications for Polysomnography: The patient is a 58 y old Male   who is 5' 9\" and weighs 196.0 lbs. His BMI is 29.0, [place]   sleepiness scale 16.0 and neck circumference is 41.0 cm. Relevant   medical history includes hypertension. A diagnostic polysomnogram   was performed to evaluate for sleep apnea. After 178.0 minutes of   sleep time the patient exhibited sufficient respiratory events   qualifying him for a CPAP trial which was then initiated.    Polysomnogram Data: A full night polysomnogram recorded the   standard physiologic parameters including EEG, EOG, EMG, ECG,   nasal and oral airflow. Respiratory parameters of chest and   abdominal movements were recorded with respiratory inductance   plethysmography. Oxygen " saturation was recorded by pulse   oximetry.  Hypopnea scoring rule used: 1B 4%    Diagnostic PSG  Sleep Architecture: Sleep was fragmented.   The total recording time of the polysomnogram was 239.4 minutes.   The total sleep time was 178.0 minutes. Sleep latency was   decreased at 0.3 minutes without the use of a sleep aid. REM   latency was 213.0 minutes. Arousal index was increased at 75.5   arousals per hour. Sleep efficiency was decreased at 74.4%. Wake   after sleep onset was 50.5 minutes. The patient spent 34.3% of   total sleep time in Stage N1, 59.0% in Stage N2, 0.0% in Stage   N3, and 6.7% in REM. Time in REM supine was 12.0 minutes.    Respiration: Severe obstructive sleep apnea.   ? Events ? The polysomnogram revealed a presence of 125   obstructive, - central, and - mixed apneas resulting in an apnea   index of 42.1 events per hour. There were 77 obstructive   hypopneas and - central hypopneas resulting in an obstructive   hypopnea index of 26.0 and central hypopnea index of - events per   hour. The combined apnea/hypopnea index was 68.1 events per hour   (central apnea/hypopnea index was - events per hour).  The REM   AHI was 40.0 events per hour. The supine AHI was 68.1 events per   hour. The RERA index was 2.4 events per hour. The RDI was 70.4   events per hour.  ? Snoring - was reported as loud.  ? Respiratory rate and pattern - was notable for normal   respiratory rate and pattern.  ? Sustained Sleep Associated Hypoventilation - Transcutaneous   carbon dioxide monitoring was not used; however significant   hypoventilation was not suggested by oximetry.  ? Sleep Associated Hypoxemia - (Greater than 5 minutes O2 sat at   or below 88%) was present. Baseline oxygen saturation was 92.6%.   Lowest oxygen saturation was 49.8%. Time spent less than or equal   to 88% was 38.0 minutes. Time spent less than or equal to 89% was   43.9 minutes.     Treatment PSG  Sleep Architecture: Improved with reduction in  arousals and   increased REM.   At 02:26:25 AM the patient was placed on PAP treatment and was   titrated at pressures ranging from CPAP 6 cmH2O up to CPAP 9   cmH2O. The total recording time of the treatment portion of the   study was 228.9 minutes. The total sleep time was 205.0 minutes.   During the treatment portion of the study the sleep latency was   10.0 minutes. REM latency was 50.0 minutes. Arousal index was   normal at 15.5 arousals per hour. Sleep efficiency was normal at   89.5%. Wake after sleep onset was 12.0 minutes. The patient spent   9.0% of total sleep time in Stage N1, 25.1% in Stage N2, 30.0% in   Stage N3, and 35.9% in REM. Time in REM supine was 73.5 minutes.     Respiration: CPAP effectively treated sleep apnea.   ? The final pressure was CPAP 9 cmH2O with an AHI of 0.8 events   per hour. Time in REM supine on final pressure was 57.0 minutes.   ? This titration was considered :  - Optimal (residual AHI < 5 events per hour and including   REM?supine sleep at final pressure).     Movement Activity: There was no significant movement abnormality.     ? Periodic Limb Movements  o During the diagnostic portion of the study, there were - PLMs   recorded. The PLM index was - movements per hour. The PLM Arousal   Index was - per hour.  o During the treatment portion of the study, there were - PLMs   recorded. The PLM index was - movements per hour. The PLM Arousal   Index was - per hour.  ? REM EMG Activity - Excessive transient/sustained muscle   activity was not present.  ? Nocturnal Behavior - Abnormal sleep related behaviors were not   noted during/arising out of NREM / REM sleep.   ? Bruxism - None apparent.    Cardiac Summary: Sinus rhythm.   During the diagnostic portion of the study, the average pulse   rate was 62.1 bpm. The minimum pulse rate was 50.9 bpm while the   maximum pulse rate was 92.1 bpm.    During the treatment portion of the study, the average pulse rate   was 60.5 bpm. The  minimum pulse rate was 53.9 bpm while the   maximum pulse rate was 82.1 bpm.     Arrhythmias were not noted.    Assessment:   ? This sleep study shows severe obstructive sleep apnea and   associated oxygen desaturations.   ? CPAP was effective in treating sleep apnea with an optimal   pressure of 9 cm H2O.     Recommendations:  ? Treatment of AWA with Auto?titrating PAP therapy with a range   of 9 cmH2O to 12 cmH2O. Recommend clinical follow up with sleep   management team, including review of compliance measures.  ? Weight management.    Diagnostic Codes:   Obstructive Sleep Apnea G47.33  Sleep Hypoxemia G47.36   Repetitive Intrusions Into Sleep F51.8      1/19/2019 [facility] Sleep Study (196.0 lbs) - AHI 68.1, RDI   70.4, Supine AHI 68.1, REM AHI 40.0, Low O2% 49.8%, Time Spent   ?88% 38.0, Time Spent ?89% 43.9. Treatment was titrated to a   pressure of CPAP 9 with an AHI 0.8. Time spent in REM supine at   this pressure was 57.0 minutes.    _____________________________________   Electronically Signed By: Anand Avelar MD 01/21/2019            ASSESSMENT/PLAN:       (K59.00) Constipation, unspecified constipation type  (primary encounter diagnosis)  Comment: chronic constipation, not well controlled  Plan: docusate sodium (COLACE) 100 MG capsule,         polyethylene glycol (MIRALAX/GLYCOLAX) packet             (I10) Essential hypertension  Comment: BP is well controlled   Plan: amLODIPine (NORVASC) 10 MG tablet      (J30.2) Chronic seasonal allergic rhinitis  Comment: well controlled. Patient is due for Zyrtec medication refill.  Plan: cetirizine (ZYRTEC) 10 MG tablet      (E78.5) Hyperlipidemia LDL goal <100  Comment: symptoms stable on Simvastatin.  Plan: simvastatin (ZOCOR) 5 MG tablet      Follow Up Plan:     Patient is instructed to return to Internal Medicine clinic for follow-up visit in 1 month.        Tara Mehta MD  Internal Medicine  [facility]

## 2019-08-28 ENCOUNTER — DOCUMENTATION ONLY (OUTPATIENT)
Dept: SLEEP MEDICINE | Facility: CLINIC | Age: 59
End: 2019-08-28

## 2019-08-28 DIAGNOSIS — G47.33 OSA (OBSTRUCTIVE SLEEP APNEA): ICD-10-CM

## 2019-08-28 NOTE — PROGRESS NOTES
6 month [facility] Recheck Visit     Diagnostic AHI: 68.1    PSG    Message left for patient to return call     Assessment:   Device stopped calling in on 7/27/2019.  He had nightly usage before this time.  Maybe modem issues.   Action plan: waiting for patient to return call.   pt to follow up per provider request       Device type: Auto-CPAP  PAP settings: CPAP min 9 cm  H20     CPAP max 12 cm  H20     Objective measures: 14 day rolling measures            Objective measure goal  Compliance   Goal >70%  Leak   Goal < 24 lpm  AHI  Goal < 5  Usage  Goal >240

## 2019-12-23 ENCOUNTER — OFFICE VISIT (OUTPATIENT)
Dept: FAMILY MEDICINE | Facility: CLINIC | Age: 59
End: 2019-12-23
Payer: COMMERCIAL

## 2019-12-23 VITALS
DIASTOLIC BLOOD PRESSURE: 77 MMHG | TEMPERATURE: 98.2 F | SYSTOLIC BLOOD PRESSURE: 118 MMHG | HEIGHT: 69 IN | OXYGEN SATURATION: 97 % | BODY MASS INDEX: 28.44 KG/M2 | WEIGHT: 192 LBS | HEART RATE: 71 BPM

## 2019-12-23 DIAGNOSIS — Z00.00 ROUTINE HISTORY AND PHYSICAL EXAMINATION OF ADULT: Primary | ICD-10-CM

## 2019-12-23 DIAGNOSIS — K21.9 GASTROESOPHAGEAL REFLUX DISEASE, ESOPHAGITIS PRESENCE NOT SPECIFIED: ICD-10-CM

## 2019-12-23 PROCEDURE — 99396 PREV VISIT EST AGE 40-64: CPT | Performed by: INTERNAL MEDICINE

## 2019-12-23 ASSESSMENT — MIFFLIN-ST. JEOR: SCORE: 1669.7

## 2019-12-23 NOTE — PROGRESS NOTES
SUBJECTIVE:   CC: Drew Bradford is an 59 year old male who presents for preventative health visit.     Healthy Habits:     Getting at least 3 servings of Calcium per day:  Yes    Bi-annual eye exam:  NO    Dental care twice a year:  NO    Sleep apnea or symptoms of sleep apnea:  Sleep apnea    Diet:  Regular (no restrictions)    Frequency of exercise:  6-7 days/week    Duration of exercise:  45-60 minutes    Taking medications regularly:  Yes    Barriers to taking medications:  None    Medication side effects:  None    PHQ-2 Total Score: 0    Additional concerns today:  Yes      Today's PHQ-2 Score:   PHQ-2 ( 1999 [employer]) 5/22/2019   Q1: Little interest or pleasure in doing things 0   Q2: Feeling down, depressed or hopeless 0   PHQ-2 Score 0       Abuse: Current or Past(Physical, Sexual or Emotional)- No  Do you feel safe in your environment? Yes    Have you ever done Advance Care Planning? (For example, a Health Directive, POLST, or a discussion with a medical provider or your loved ones about your wishes): No, advance care planning information given to patient to review.  Patient plans to discuss their wishes with loved ones or provider.      Social History     Tobacco Use     Smoking status: Never Smoker     Smokeless tobacco: Never Used   Substance Use Topics     Alcohol use: Yes     Comment: 2 beers per month     If you drink alcohol do you typically have >3 drinks per day or >7 drinks per week? No      Last PSA:   PSA   Date Value Ref Range Status   05/24/2018 1.22 0 - 4 ug/L Final     Comment:     Assay Method:  Chemiluminescence using Siemens Vista analyzer       Reviewed orders with patient. Reviewed health maintenance and updated orders accordingly - Yes      Reviewed and updated as needed this visit by clinical staff         Reviewed and updated as needed this visit by Provider        Past Medical History:   Diagnosis Date     HTN (hypertension)      Presbyopia      Seasonal allergies         Review of  "Systems  CONSTITUTIONAL: NEGATIVE for fever, chills, change in weight  INTEGUMENTARY/SKIN: NEGATIVE for worrisome rashes, moles or lesions  EYES: NEGATIVE for vision changes or irritation  ENT: NEGATIVE for ear, mouth and throat problems  RESP: NEGATIVE for significant cough or SOB  CV: NEGATIVE for chest pain, palpitations or peripheral edema  GI: POSITIVE for  heartburn   male: negative for dysuria, hematuria, decreased urinary stream, erectile dysfunction, urethral discharge  MUSCULOSKELETAL: NEGATIVE for significant arthralgias or myalgia  NEURO: NEGATIVE for weakness, dizziness or paresthesias  PSYCHIATRIC: NEGATIVE for changes in mood or affect    OBJECTIVE:   /77 (BP Location: Left arm, Patient Position: Sitting, Cuff Size: Adult Large)   Pulse 71   Temp 98.2  F (36.8  C) (Oral)   Ht 1.75 m (5' 8.9\")   Wt 87.1 kg (192 lb)   SpO2 97%   BMI 28.44 kg/m      Physical Exam  GENERAL: alert and no distress  EYES: Eyes grossly normal to inspection, PERRL and conjunctivae and sclerae normal  HENT: ear canals and TM's normal, nose and mouth without ulcers or lesions  NECK: no adenopathy, no asymmetry, masses, or scars and thyroid normal to palpation  RESP: lungs clear to auscultation - no rales, rhonchi or wheezes  CV: regular rate and rhythm, normal S1 S2, no S3 or S4, no murmur, click or rub, no peripheral edema and peripheral pulses strong  ABDOMEN: soft, nontender, no hepatosplenomegaly, no masses and bowel sounds normal  MS: no gross musculoskeletal defects noted, no edema  SKIN: no suspicious lesions or rashes  NEURO: Normal strength and tone, mentation intact and speech normal  PSYCH: mentation appears normal, affect normal/bright    Diagnostic Test Results:  Labs reviewed in Epic  No results found for any visits on 12/23/19.    ASSESSMENT/PLAN:   (Z00.00) Routine history and physical examination of adult  (primary encounter diagnosis)  Comment: yearly physical exam today  Plan: patient declined " "blood draw for yearly physical exam labs at this time.    (K21.9) Gastroesophageal reflux disease, esophagitis presence not specified  Comment: poorly controlled chronic GERD symptoms  Plan: I have ordered diagnostic upper EGD endoscopy with GASTROENTEROLOGY ADULT REF PROCEDURE ONLY         Ted Vargas [phone]; No Provider        Preference, for further evaluation      COUNSELING:   Reviewed preventive health counseling, as reflected in patient instructions  Special attention given to:        Regular exercise       Healthy diet/nutrition    Estimated body mass index is 29.92 kg/m  as calculated from the following:    Height as of 5/22/19: 1.75 m (5' 8.9\").    Weight as of 5/22/19: 91.6 kg (202 lb).     Weight management plan: Discussed healthy diet and exercise guidelines     reports that he has never smoked. He has never used smokeless tobacco.      Counseling Resources:  ATP IV Guidelines  Pooled Cohorts Equation Calculator  FRAX Risk Assessment  ICSI Preventive Guidelines  Dietary Guidelines for Americans, 2010  USDA's MyPlate  ASA Prophylaxis  Lung CA Screening    Tara Mehta MD  [facility]  "

## 2020-01-14 ENCOUNTER — HOSPITAL ENCOUNTER (OUTPATIENT)
Facility: CLINIC | Age: 60
Discharge: HOME | End: 2020-01-14
Attending: SPECIALIST | Admitting: SPECIALIST
Payer: COMMERCIAL

## 2020-01-14 VITALS
RESPIRATION RATE: 9 BRPM | HEART RATE: 63 BPM | SYSTOLIC BLOOD PRESSURE: 122 MMHG | OXYGEN SATURATION: 99 % | DIASTOLIC BLOOD PRESSURE: 97 MMHG

## 2020-01-14 PROCEDURE — 25000125 ZZHC RX 250: Performed by: SPECIALIST

## 2020-01-14 PROCEDURE — 88305 TISSUE EXAM BY PATHOLOGIST: CPT | Mod: 26 | Performed by: SPECIALIST

## 2020-01-14 PROCEDURE — 43239 EGD BIOPSY SINGLE/MULTIPLE: CPT | Performed by: SPECIALIST

## 2020-01-14 PROCEDURE — 25000128 H RX IP 250 OP 636: Performed by: SPECIALIST

## 2020-01-14 PROCEDURE — 88305 TISSUE EXAM BY PATHOLOGIST: CPT | Performed by: SPECIALIST

## 2020-01-14 PROCEDURE — G0500 MOD SEDAT ENDO SERVICE >5YRS: HCPCS | Performed by: SPECIALIST

## 2020-01-14 RX ORDER — FENTANYL CITRATE 50 UG/ML
INJECTION, SOLUTION INTRAMUSCULAR; INTRAVENOUS PRN
Status: DISCONTINUED | OUTPATIENT
Start: 2020-01-14 | End: 2020-01-14 | Stop reason: HOSPADM

## 2020-01-14 RX ORDER — LIDOCAINE 40 MG/G
CREAM TOPICAL
Status: DISCONTINUED | OUTPATIENT
Start: 2020-01-14 | End: 2020-01-14 | Stop reason: HOSPADM

## 2020-01-14 RX ORDER — ONDANSETRON 2 MG/ML
4 INJECTION INTRAMUSCULAR; INTRAVENOUS
Status: DISCONTINUED | OUTPATIENT
Start: 2020-01-14 | End: 2020-01-14 | Stop reason: HOSPADM

## 2020-01-14 NOTE — H&P
Pre-Endoscopy History and Physical     Drew Bradford MRN# [id number]   YOB: 1960 Age: 59 year old     Date of Procedure: 1/14/2020  Primary care provider: Tara Mehta  Type of Endoscopy: Gastroscopy with possible biopsy, possible dilation  Reason for Procedure: dysphagia  Type of Anesthesia Anticipated: Conscious Sedation    HPI:    Drew is a 59 year old male who will be undergoing the above procedure.      A history and physical has been performed. The patient's medications and allergies have been reviewed. The risks and benefits of the procedure and the sedation options and risks were discussed with the patient.  All questions were answered and informed consent was obtained.      He denies a personal or family history of anesthesia complications or bleeding disorders.     Patient Active Problem List   Diagnosis     Hyperlipidemia LDL goal <100     AWA (obstructive sleep apnea)        Past Medical History:   Diagnosis Date     HTN (hypertension)      Presbyopia      Seasonal allergies         Past Surgical History:   Procedure Laterality Date     C REMOVAL OF KIDNEY STONE       COLONOSCOPY N/A 7/16/2018    Procedure: COLONOSCOPY;  colonoscopy;  Surgeon: Geremias Strickland MD;  Location:  GI       Social History     Tobacco Use     Smoking status: Never Smoker     Smokeless tobacco: Never Used   Substance Use Topics     Alcohol use: Yes     Alcohol/week: 2.0 standard drinks     Types: 2 Cans of beer per week     Comment: 1-2 drinks a week       History reviewed. No pertinent family history.    Prior to Admission medications    Medication Sig Start Date End Date Taking? Authorizing Provider   amLODIPine (NORVASC) 10 MG tablet Take 1 tablet (10 mg) by mouth daily 5/22/19  Yes Tara Mehta MD   Calcium Carb-Cholecalciferol (CALCIUM + D3) 600-200 MG-UNIT TABS Take by mouth daily   Yes Reported, Patient   Ginkgo Biloba Complex 440 MG CAPS    Yes Reported, Patient       Allergies  "  Allergen Reactions     Penicillins      Amoxicillin Rash     Back pain, kidney pain.         REVIEW OF SYSTEMS:   5 point ROS negative except as noted above in HPI, including Gen., Resp., CV, GI &  system review.    PHYSICAL EXAM:   BP (!) 135/96 (Cuff Size: Adult Regular)   Pulse 60   Resp 16   SpO2 100%  Estimated body mass index is 28.44 kg/m  as calculated from the following:    Height as of 12/23/19: 1.75 m (5' 8.9\").    Weight as of 12/23/19: 87.1 kg (192 lb).   GENERAL APPEARANCE: alert, and oriented  MENTAL STATUS: alert  AIRWAY EXAM: Mallampatti Class II (visualization of the soft palate, fauces, and uvula)  RESP: lungs clear to auscultation - no rales, rhonchi or wheezes  CV: regular rates and rhythm  DIAGNOSTICS:    Not indicated    IMPRESSION   ASA Class 2 - Mild systemic disease    PLAN:   Plan for Gastroscopy with possible biopsy, possible dilation. We discussed the risks, benefits and alternatives and the patient wished to proceed.    The above has been forwarded to the consulting provider.      Signed Electronically by: Tez Vences MD  January 14, 2020          "

## 2020-01-15 LAB
COPATH REPORT: NORMAL
UPPER GI ENDOSCOPY: NORMAL

## 2020-01-15 NOTE — RESULT ENCOUNTER NOTE
Assessment: A reactive gastropathy is a non specific finding. Although architectural changes are noted in the cells of the stomach lining, no active inflammation is present. It can be caused by medications (usually aspirin or NSAIDs), alcohol and intestinal contents that reflux back into the stomach (usually in the setting of post surgical anatomy). Treatment is focused on removing the offending agent (if known), reducing stomach acidity (with histamine type II blockers or proton pump inhibitors) or protecting the gastric mucosa (with agents like Pepto-Bismol , Carafate and Misoprostol). Given the absence of indigestion, I don't believe this finding has any clinical significance; there is no evidence of intestinal metaplasia.     There is no evidence of a mechanical obstruction or lesion to explain the sensation of obstruction/dysphagia. The limitations of endoscopy include the inability to exclude subtle narrowing of the esophageal lumen and it can't exclude motility abnormalities.     Recommendations: If dysphagia persists, obtain a barium esophagram with barium pill bolus. If that is also negative and symptoms persist, consider an esophageal motility study.

## 2020-05-26 ENCOUNTER — VIRTUAL VISIT (OUTPATIENT)
Dept: FAMILY MEDICINE | Facility: CLINIC | Age: 60
End: 2020-05-26
Payer: COMMERCIAL

## 2020-05-26 DIAGNOSIS — E78.5 HYPERLIPIDEMIA LDL GOAL <100: Primary | ICD-10-CM

## 2020-05-26 DIAGNOSIS — I10 ESSENTIAL HYPERTENSION: ICD-10-CM

## 2020-05-26 PROCEDURE — 99214 OFFICE O/P EST MOD 30 MIN: CPT | Mod: 95 | Performed by: NURSE PRACTITIONER

## 2020-05-26 RX ORDER — AMLODIPINE BESYLATE 10 MG/1
10 TABLET ORAL DAILY
Qty: 90 TABLET | Refills: 3 | Status: SHIPPED | OUTPATIENT
Start: 2020-05-26 | End: 2020-06-10

## 2020-05-26 RX ORDER — HYDROCHLOROTHIAZIDE 25 MG/1
25 TABLET ORAL DAILY
Qty: 30 TABLET | Refills: 3 | Status: SHIPPED | OUTPATIENT
Start: 2020-05-26 | End: 2021-06-23

## 2020-05-26 NOTE — PROGRESS NOTES
"Drew Bradford is a 59 year old male who is being evaluated via a billable telephone visit.      The patient has been notified of following:     \"This telephone visit will be conducted via a call between you and your physician/provider. We have found that certain health care needs can be provided without the need for a physical exam.  This service lets us provide the care you need with a short phone conversation.  If a prescription is necessary we can send it directly to your pharmacy.  If lab work is needed we can place an order for that and you can then stop by our lab to have the test done at a later time.    Telephone visits are billed at different rates depending on your insurance coverage. During this emergency period, for some insurers they may be billed the same as an in-person visit.  Please reach out to your insurance provider with any questions.    If during the course of the call the physician/provider feels a telephone visit is not appropriate, you will not be charged for this service.\"    Patient has given verbal consent for Telephone visit?  Yes    What phone number would you like to be contacted at? [phone]    How would you like to obtain your AVS? Mail a copy    Subjective     Drew Bradford is a 59 year old male who presents via phone visit today for the following health issues:    HPI  This telephone virtual visit achieved with assistance of Mandarin Chinese .   Mr Bradford has been on amlodipine 10 mg for control of hypertension and is requesting refill at this time.  He has a writs cuff at home and today BPs are 155/98 and 149/95.  He has had varying clinic readings with consistently high diastolic values.  He denies headache, chest pain , SOB.  Has no exercise intolerance.  No extremity edema.  He does not recall ever being on any other anti hypertensive.      He is also concerned about an itchy skin rash that he developed while on simvastatin that resolved once he discontinued the medication " . He did not discuss this with his PCP.  His last lipid profile in 2018. He would like this repeated at this time to ascertain need to resume a statin or other medication .  He has not had dietary instruction.     Patient Active Problem List   Diagnosis     Hyperlipidemia LDL goal <100     AWA (obstructive sleep apnea)     Past Surgical History:   Procedure Laterality Date     C REMOVAL OF KIDNEY STONE       COLONOSCOPY N/A 7/16/2018    Procedure: COLONOSCOPY;  colonoscopy;  Surgeon: Geremias Strickland MD;  Location:  GI     ESOPHAGOSCOPY, GASTROSCOPY, DUODENOSCOPY (EGD), COMBINED N/A 1/14/2020    Procedure: ESOPHAGOGASTRODUODENOSCOPY, WITH BIOPSY;  Surgeon: Tez Vences MD;  Location:  GI       Social History     Tobacco Use     Smoking status: Never Smoker     Smokeless tobacco: Never Used   Substance Use Topics     Alcohol use: Yes     Alcohol/week: 2.0 standard drinks     Types: 2 Cans of beer per week     Comment: 1-2 drinks a week     No family history on file.      Current Outpatient Medications   Medication Sig Dispense Refill     amLODIPine (NORVASC) 10 MG tablet Take 1 tablet (10 mg) by mouth daily 90 tablet 3     Calcium Carb-Cholecalciferol (CALCIUM + D3) 600-200 MG-UNIT TABS Take by mouth daily       Ginkgo Biloba Complex 440 MG CAPS        hydrochlorothiazide (HYDRODIURIL) 25 MG tablet Take 1 tablet (25 mg) by mouth daily 30 tablet 3     Allergies   Allergen Reactions     Penicillins      Amoxicillin Rash     Back pain, kidney pain.        Reviewed and updated as needed this visit by Provider         Review of Systems   Constitutional, HEENT, cardiovascular, pulmonary, GI, , musculoskeletal, neuro, skin, endocrine and psych systems are negative, except as otherwise noted.       Objective   Reported vitals:  There were no vitals taken for this visit.     PSYCH: Alert and oriented times 3; coherent speech, normal   rate and volume, able to articulate logical thoughts, able   to abstract  reason, no tangential thoughts, no hallucinations   or delusions  His affect is normal  RESP: No cough, no audible wheezing, able to talk in full sentences  Remainder of exam unable to be completed due to telephone visits    Diagnostic Test Results:  Labs reviewed in Epic        Assessment/Plan:  1. Essential hypertension  His BP is not currently controlled.  Will add low dose diuretic and recheck BMP and BP on nurse's schedule in 2 weeks.  Educated regarding effects and side effects of diuretic.   - amLODIPine (NORVASC) 10 MG tablet; Take 1 tablet (10 mg) by mouth daily  Dispense: 90 tablet; Refill: 3  - hydrochlorothiazide (HYDRODIURIL) 25 MG tablet; Take 1 tablet (25 mg) by mouth daily  Dispense: 30 tablet; Refill: 3  - Basic metabolic panel  (Ca, Cl, CO2, Creat, Gluc, K, Na, BUN); Future    2. Hyperlipidemia LDL goal <100    - Lipid panel reflex to direct LDL Non-fasting; Future  Once lipids have been rechecked may refer to dietitian to discuss lifestyle and diet modifications to maintain normal cholesterol as he may have had sensitivity to statin     Phone call duration:  30 minutes    DIAMANTE Sunshine CNP

## 2020-05-28 RX ORDER — AMLODIPINE BESYLATE 10 MG/1
10 TABLET ORAL DAILY
Qty: 90 TABLET | Refills: 0 | Status: SHIPPED | OUTPATIENT
Start: 2020-05-28 | End: 2020-06-10

## 2020-05-28 NOTE — TELEPHONE ENCOUNTER
"Requested Prescriptions   Pending Prescriptions Disp Refills     amLODIPine (NORVASC) 10 MG tablet [Pharmacy Med Name: amLODIPine Besylate Oral Tablet 10 MG] 90 tablet 0     Sig: Take 1 tablet (10 mg) by mouth daily       Calcium Channel Blockers Protocol  Failed - 5/26/2020 12:45 PM        Failed - Blood pressure under 140/90 in past 12 months     BP Readings from Last 3 Encounters:   01/14/20 (!) 122/97   12/23/19 118/77   05/22/19 130/82                 Failed - Normal serum creatinine on file in past 12 months     Recent Labs   Lab Test 05/24/18  1032   CR 0.96       Ok to refill medication if creatinine is low          Passed - Recent (12 mo) or future (30 days) visit within the authorizing provider's specialty     Patient has had an office visit with the authorizing provider or a provider within the authorizing providers department within the previous 12 mos or has a future within next 30 days. See \"Patient Info\" tab in inbasket, or \"Choose Columns\" in Meds & Orders section of the refill encounter.              Passed - Medication is active on med list        Passed - Patient is age 18 or older             Prescription approved per [facility] Refill Protocol.  "

## 2020-06-05 DIAGNOSIS — E78.5 HYPERLIPIDEMIA LDL GOAL <100: ICD-10-CM

## 2020-06-05 DIAGNOSIS — I10 ESSENTIAL HYPERTENSION: ICD-10-CM

## 2020-06-05 LAB
ANION GAP SERPL CALCULATED.3IONS-SCNC: 7 MMOL/L (ref 3–14)
BUN SERPL-MCNC: 17 MG/DL (ref 7–30)
CALCIUM SERPL-MCNC: 8.3 MG/DL (ref 8.5–10.1)
CHLORIDE SERPL-SCNC: 107 MMOL/L (ref 94–109)
CHOLEST SERPL-MCNC: 184 MG/DL
CO2 SERPL-SCNC: 25 MMOL/L (ref 20–32)
CREAT SERPL-MCNC: 0.95 MG/DL (ref 0.66–1.25)
GFR SERPL CREATININE-BSD FRML MDRD: 86 ML/MIN/{1.73_M2}
GLUCOSE SERPL-MCNC: 98 MG/DL (ref 70–99)
HDLC SERPL-MCNC: 43 MG/DL
LDLC SERPL CALC-MCNC: 109 MG/DL
NONHDLC SERPL-MCNC: 141 MG/DL
POTASSIUM SERPL-SCNC: 3.8 MMOL/L (ref 3.4–5.3)
SODIUM SERPL-SCNC: 139 MMOL/L (ref 133–144)
TRIGL SERPL-MCNC: 160 MG/DL

## 2020-06-05 PROCEDURE — 36415 COLL VENOUS BLD VENIPUNCTURE: CPT | Performed by: NURSE PRACTITIONER

## 2020-06-05 PROCEDURE — 80061 LIPID PANEL: CPT | Performed by: NURSE PRACTITIONER

## 2020-06-05 PROCEDURE — 80048 BASIC METABOLIC PNL TOTAL CA: CPT | Performed by: NURSE PRACTITIONER

## 2020-06-05 NOTE — RESULT ENCOUNTER NOTE
The following letter pertains to your most recent diagnostic tests:    This is a copy of your lab results.  Your cholesterol is a little better.  Your other labs are stable. I would recommend scheduling a virtual visit with Dr. Mehta to review these results.       Sincerely,    Dr. Leyva

## 2020-06-05 NOTE — LETTER
June 5, 2020      Drew Bradford  [address] [facility] [address]        Dear ,    We are writing to inform you of your test results.    This is a copy of your lab results.  Your cholesterol is a little better.  Your other labs are stable. I would recommend scheduling a virtual visit with Dr. Mehta to review these results.     Resulted Orders   Lipid panel reflex to direct LDL Non-fasting   Result Value Ref Range    Cholesterol 184 <200 mg/dL    Triglycerides 160 (H) <150 mg/dL      Comment:      Borderline high:  150-199 mg/dl  High:             200-499 mg/dl  Very high:       >499 mg/dl      HDL Cholesterol 43 >39 mg/dL    LDL Cholesterol Calculated 109 (H) <100 mg/dL      Comment:      Above desirable:  100-129 mg/dl  Borderline High:  130-159 mg/dL  High:             160-189 mg/dL  Very high:       >189 mg/dl      Non HDL Cholesterol 141 (H) <130 mg/dL      Comment:      Above Desirable:  130-159 mg/dl  Borderline high:  160-189 mg/dl  High:             190-219 mg/dl  Very high:       >219 mg/dl     Basic metabolic panel  (Ca, Cl, CO2, Creat, Gluc, K, Na, BUN)   Result Value Ref Range    Sodium 139 133 - 144 mmol/L    Potassium 3.8 3.4 - 5.3 mmol/L    Chloride 107 94 - 109 mmol/L    Carbon Dioxide 25 20 - 32 mmol/L    Anion Gap 7 3 - 14 mmol/L    Glucose 98 70 - 99 mg/dL    Urea Nitrogen 17 7 - 30 mg/dL    Creatinine 0.95 0.66 - 1.25 mg/dL    GFR Estimate 86 >60 mL/min/[1.73_m2]      Comment:      Non  GFR Calc  Starting 12/18/2018, serum creatinine based estimated GFR (eGFR) will be   calculated using the Chronic Kidney Disease Epidemiology Collaboration   (CKD-EPI) equation.      GFR Estimate If Black >90 >60 mL/min/[1.73_m2]      Comment:       GFR Calc  Starting 12/18/2018, serum creatinine based estimated GFR (eGFR) will be   calculated using the Chronic Kidney Disease Epidemiology Collaboration   (CKD-EPI) equation.      Calcium 8.3 (L) 8.5 - 10.1 mg/dL       If  you have any questions or concerns, please call the clinic at the number listed above.       Sincerely,      Dr. Autumn MD/NHAN PATEL

## 2020-06-10 ENCOUNTER — VIRTUAL VISIT (OUTPATIENT)
Dept: FAMILY MEDICINE | Facility: CLINIC | Age: 60
End: 2020-06-10
Payer: COMMERCIAL

## 2020-06-10 DIAGNOSIS — E78.5 HYPERLIPIDEMIA LDL GOAL <100: ICD-10-CM

## 2020-06-10 DIAGNOSIS — I10 ESSENTIAL HYPERTENSION: Primary | ICD-10-CM

## 2020-06-10 DIAGNOSIS — J30.2 SEASONAL ALLERGIC RHINITIS, UNSPECIFIED TRIGGER: ICD-10-CM

## 2020-06-10 PROCEDURE — 99214 OFFICE O/P EST MOD 30 MIN: CPT | Mod: 95 | Performed by: INTERNAL MEDICINE

## 2020-06-10 RX ORDER — AMLODIPINE BESYLATE 10 MG/1
10 TABLET ORAL DAILY
Qty: 90 TABLET | Refills: 3 | Status: SHIPPED | OUTPATIENT
Start: 2020-06-10 | End: 2021-06-23

## 2020-06-10 NOTE — PROGRESS NOTES
"Drew Bradford is a 59 year old male who is being evaluated via a billable telephone visit.      The patient has been notified of following:     \"This telephone visit will be conducted via a call between you and your physician/provider. We have found that certain health care needs can be provided without the need for a physical exam.  This service lets us provide the care you need with a short phone conversation.  If a prescription is necessary we can send it directly to your pharmacy.  If lab work is needed we can place an order for that and you can then stop by our lab to have the test done at a later time.    Telephone visits are billed at different rates depending on your insurance coverage. During this emergency period, for some insurers they may be billed the same as an in-person visit.  Please reach out to your insurance provider with any questions.    If during the course of the call the physician/provider feels a telephone visit is not appropriate, you will not be charged for this service.\"    Patient has given verbal consent for Telephone visit?  Yes    What phone number would you like to be contacted at? [phone] and  available [phone] option 1 then 0    How would you like to obtain your AVS? {AVS Preference:045561}    Subjective     Drew Bradford is a 59 year old male who presents via phone visit today for the following health issues:    HPI  {SUPERLIST (Optional):608795}  {PEDS Chronic and Acute Problems (Optional):569574}     {additonal problems for provider to add (Optional):478709}    {HIST REVIEW/ LINKS 2 (Optional):725508}    Reviewed and updated as needed this visit by Provider         Review of Systems   {ROS COMP (Optional):019262}       Objective   Reported vitals:  There were no vitals taken for this visit.   {GENERAL APPEARANCE:50::\"healthy\",\"alert\",\"no distress\"}  PSYCH: Alert and oriented times 3; coherent speech, normal   rate and volume, able to articulate logical thoughts, " "able   to abstract reason, no tangential thoughts, no hallucinations   or delusions  His affect is { :9638536::\"normal\"}  RESP: No cough, no audible wheezing, able to talk in full sentences  Remainder of exam unable to be completed due to telephone visits    {Diagnostic Test Results (Optional):029989::\"Diagnostic Test Results:\",\"Labs reviewed in Epic\"}        Assessment/Plan:  {Diagnosis, Associated Orders and Comment:287218}    No follow-ups on file.      Phone call duration:  *** minutes    {signature options:106949}      "

## 2020-06-10 NOTE — PROGRESS NOTES
"Drew Bradford is a 59 year old male who is being evaluated via a billable telephone visit.      The patient has been notified of following:     \"This telephone visit will be conducted via a call between you and your physician/provider. We have found that certain health care needs can be provided without the need for a physical exam.  This service lets us provide the care you need with a short phone conversation.  If a prescription is necessary we can send it directly to your pharmacy.  If lab work is needed we can place an order for that and you can then stop by our lab to have the test done at a later time.    Telephone visits are billed at different rates depending on your insurance coverage. During this emergency period, for some insurers they may be billed the same as an in-person visit.  Please reach out to your insurance provider with any questions.    If during the course of the call the physician/provider feels a telephone visit is not appropriate, you will not be charged for this service.\"    Patient has given verbal consent for Telephone visit?  Yes    What phone number would you like to be contacted at? [phone] and  available [phone] option 1 then 0    How would you like to obtain your AVS? MyChart        Hypertension Follow-up      Do you check your blood pressure regularly outside of the clinic? Yes     Are you following a low salt diet? Yes    Are your blood pressures ever more than 140 on the top number (systolic) OR more   than 90 on the bottom number (diastolic), for example 140/90? No 128/81  No chest pain, headaches, fever or chills.          Current Medications:     Current Outpatient Medications   Medication Sig Dispense Refill     amLODIPine (NORVASC) 10 MG tablet Take 1 tablet (10 mg) by mouth daily 90 tablet 3     Calcium Carb-Cholecalciferol (CALCIUM + D3) 600-200 MG-UNIT TABS Take by mouth daily       Ginkgo Biloba Complex 440 MG CAPS Take 440 mg by mouth daily        " hydrochlorothiazide (HYDRODIURIL) 25 MG tablet Take 1 tablet (25 mg) by mouth daily (Patient not taking: Reported on 6/10/2020) 30 tablet 3         Allergies:      Allergies   Allergen Reactions     Penicillins      Amoxicillin Rash     Back pain, kidney pain.             Past Medical History:     Past Medical History:   Diagnosis Date     HTN (hypertension)      Presbyopia      Seasonal allergies          Past Surgical History:     Past Surgical History:   Procedure Laterality Date     C REMOVAL OF KIDNEY STONE       COLONOSCOPY N/A 7/16/2018    Procedure: COLONOSCOPY;  colonoscopy;  Surgeon: Geremias Strickland MD;  Location:  GI     ESOPHAGOSCOPY, GASTROSCOPY, DUODENOSCOPY (EGD), COMBINED N/A 1/14/2020    Procedure: ESOPHAGOGASTRODUODENOSCOPY, WITH BIOPSY;  Surgeon: Tez Vences MD;  Location: [facility]         Family Medical History:   No family history on file.      Social History:     Social History     Socioeconomic History     Marital status: Single     Spouse name: Not on file     Number of children: Not on file     Years of education: Not on file     Highest education level: Not on file   Occupational History     Not on file   Social Needs     Financial resource strain: Not on file     Food insecurity     Worry: Not on file     Inability: Not on file     Transportation needs     Medical: Not on file     Non-medical: Not on file   Tobacco Use     Smoking status: Never Smoker     Smokeless tobacco: Never Used   Substance and Sexual Activity     Alcohol use: Yes     Alcohol/week: 2.0 standard drinks     Types: 2 Cans of beer per week     Comment: 1-2 drinks a week     Drug use: No     Sexual activity: Yes     Partners: Female   Lifestyle     Physical activity     Days per week: Not on file     Minutes per session: Not on file     Stress: Not on file   Relationships     Social connections     Talks on phone: Not on file     Gets together: Not on file     Attends Yazdanism service: Not on file     Active  member of club or organization: Not on file     Attends meetings of clubs or organizations: Not on file     Relationship status: Not on file     Intimate partner violence     Fear of current or ex partner: Not on file     Emotionally abused: Not on file     Physically abused: Not on file     Forced sexual activity: Not on file   Other Topics Concern     Parent/sibling w/ CABG, MI or angioplasty before 65F 55M? Not Asked   Social History Narrative     Not on file           Review of System:     Constitutional: Negative for fever or chills  Skin: Negative for rashes  Ears/Nose/Throat: positive for chronic seasonal allergic rhinitis symptoms  Respiratory: No shortness of breath, dyspnea on exertion, cough, or hemoptysis  Cardiovascular: Negative for chest pain  Gastrointestinal: Negative for nausea, vomiting  Genitourinary: Negative for dysuria, hematuria  Musculoskeletal: Negative for myalgias  Neurologic: Negative for headaches  Psychiatric: Negative for depression, anxiety  Hematologic/Lymphatic/Immunologic: Negative  Endocrine: Negative  Behavioral: Negative for tobacco use       Physical Exam:   There were no vitals taken for this visit.        Diagnostic Test Results:     Diagnostic Test Results:  No results found for any visits on 06/10/20.    ASSESSMENT/PLAN:       (I10) Essential hypertension (primary encounter diagnosis)  Comment: BP is well controlled, patient is due for a refill of amlodipine medication  Plan: amLODIPine (NORVASC) 10 MG tablet      (J30.2) Chronic seasonal allergic rhinitis  Comment: well controlled.   Plan: continue cetirizine (ZYRTEC) 10 MG tablet      (E78.5) Hyperlipidemia LDL goal <100  Comment: symptoms stable on Simvastatin.  Plan: continue simvastatin (ZOCOR) 5 MG tablet      Follow Up Plan:     Patient is instructed to return to Internal Medicine clinic for follow-up visit in 2 to 3 months.      Phone call duration:  25 minutes    Tara Mehta MD

## 2020-12-27 ENCOUNTER — HEALTH MAINTENANCE LETTER (OUTPATIENT)
Age: 60
End: 2020-12-27

## 2021-03-06 ENCOUNTER — HEALTH MAINTENANCE LETTER (OUTPATIENT)
Age: 61
End: 2021-03-06

## 2021-06-23 ENCOUNTER — OFFICE VISIT (OUTPATIENT)
Dept: FAMILY MEDICINE | Facility: CLINIC | Age: 61
End: 2021-06-23
Payer: COMMERCIAL

## 2021-06-23 VITALS
HEART RATE: 60 BPM | TEMPERATURE: 96.9 F | DIASTOLIC BLOOD PRESSURE: 87 MMHG | HEIGHT: 69 IN | SYSTOLIC BLOOD PRESSURE: 137 MMHG | OXYGEN SATURATION: 99 % | BODY MASS INDEX: 29.47 KG/M2 | WEIGHT: 199 LBS

## 2021-06-23 DIAGNOSIS — E78.5 HYPERLIPIDEMIA LDL GOAL <100: ICD-10-CM

## 2021-06-23 DIAGNOSIS — I10 ESSENTIAL HYPERTENSION: ICD-10-CM

## 2021-06-23 DIAGNOSIS — J30.2 SEASONAL ALLERGIC RHINITIS, UNSPECIFIED TRIGGER: ICD-10-CM

## 2021-06-23 DIAGNOSIS — G47.33 OSA (OBSTRUCTIVE SLEEP APNEA): ICD-10-CM

## 2021-06-23 DIAGNOSIS — Z00.00 ROUTINE HISTORY AND PHYSICAL EXAMINATION OF ADULT: Primary | ICD-10-CM

## 2021-06-23 LAB
ALBUMIN SERPL-MCNC: 3.8 G/DL (ref 3.4–5)
ALP SERPL-CCNC: 47 U/L (ref 40–150)
ALT SERPL W P-5'-P-CCNC: 36 U/L (ref 0–70)
ANION GAP SERPL CALCULATED.3IONS-SCNC: 1 MMOL/L (ref 3–14)
AST SERPL W P-5'-P-CCNC: 20 U/L (ref 0–45)
BASOPHILS # BLD AUTO: 0 10E9/L (ref 0–0.2)
BASOPHILS NFR BLD AUTO: 0.3 %
BILIRUB SERPL-MCNC: 0.9 MG/DL (ref 0.2–1.3)
BUN SERPL-MCNC: 13 MG/DL (ref 7–30)
CALCIUM SERPL-MCNC: 8.5 MG/DL (ref 8.5–10.1)
CHLORIDE SERPL-SCNC: 108 MMOL/L (ref 94–109)
CHOLEST SERPL-MCNC: 204 MG/DL
CO2 SERPL-SCNC: 31 MMOL/L (ref 20–32)
CREAT SERPL-MCNC: 0.98 MG/DL (ref 0.66–1.25)
DIFFERENTIAL METHOD BLD: NORMAL
EOSINOPHIL # BLD AUTO: 0.2 10E9/L (ref 0–0.7)
EOSINOPHIL NFR BLD AUTO: 3.2 %
ERYTHROCYTE [DISTWIDTH] IN BLOOD BY AUTOMATED COUNT: 12.7 % (ref 10–15)
GFR SERPL CREATININE-BSD FRML MDRD: 83 ML/MIN/{1.73_M2}
GLUCOSE SERPL-MCNC: 94 MG/DL (ref 70–99)
HBA1C MFR BLD: 5.6 % (ref 0–5.6)
HCT VFR BLD AUTO: 45.1 % (ref 40–53)
HDLC SERPL-MCNC: 47 MG/DL
HGB BLD-MCNC: 15.7 G/DL (ref 13.3–17.7)
LDLC SERPL CALC-MCNC: 134 MG/DL
LYMPHOCYTES # BLD AUTO: 1.1 10E9/L (ref 0.8–5.3)
LYMPHOCYTES NFR BLD AUTO: 17.9 %
MCH RBC QN AUTO: 31.2 PG (ref 26.5–33)
MCHC RBC AUTO-ENTMCNC: 34.8 G/DL (ref 31.5–36.5)
MCV RBC AUTO: 90 FL (ref 78–100)
MONOCYTES # BLD AUTO: 0.5 10E9/L (ref 0–1.3)
MONOCYTES NFR BLD AUTO: 7.2 %
NEUTROPHILS # BLD AUTO: 4.5 10E9/L (ref 1.6–8.3)
NEUTROPHILS NFR BLD AUTO: 71.4 %
NONHDLC SERPL-MCNC: 157 MG/DL
PLATELET # BLD AUTO: 178 10E9/L (ref 150–450)
POTASSIUM SERPL-SCNC: 3.8 MMOL/L (ref 3.4–5.3)
PROT SERPL-MCNC: 7.4 G/DL (ref 6.8–8.8)
RBC # BLD AUTO: 5.03 10E12/L (ref 4.4–5.9)
SODIUM SERPL-SCNC: 140 MMOL/L (ref 133–144)
TRIGL SERPL-MCNC: 113 MG/DL
WBC # BLD AUTO: 6.3 10E9/L (ref 4–11)

## 2021-06-23 PROCEDURE — 36415 COLL VENOUS BLD VENIPUNCTURE: CPT | Performed by: INTERNAL MEDICINE

## 2021-06-23 PROCEDURE — 80061 LIPID PANEL: CPT | Performed by: INTERNAL MEDICINE

## 2021-06-23 PROCEDURE — 80053 COMPREHEN METABOLIC PANEL: CPT | Performed by: INTERNAL MEDICINE

## 2021-06-23 PROCEDURE — G0103 PSA SCREENING: HCPCS | Performed by: INTERNAL MEDICINE

## 2021-06-23 PROCEDURE — 83036 HEMOGLOBIN GLYCOSYLATED A1C: CPT | Performed by: INTERNAL MEDICINE

## 2021-06-23 PROCEDURE — 99396 PREV VISIT EST AGE 40-64: CPT | Performed by: INTERNAL MEDICINE

## 2021-06-23 PROCEDURE — 85025 COMPLETE CBC W/AUTO DIFF WBC: CPT | Performed by: INTERNAL MEDICINE

## 2021-06-23 RX ORDER — AMLODIPINE BESYLATE 10 MG/1
10 TABLET ORAL DAILY
Qty: 90 TABLET | Refills: 3 | Status: SHIPPED | OUTPATIENT
Start: 2021-06-23 | End: 2022-07-13

## 2021-06-23 ASSESSMENT — MIFFLIN-ST. JEOR: SCORE: 1696.45

## 2021-06-23 NOTE — PROGRESS NOTES
SUBJECTIVE:   CC: Drew Bradford is an 60 year old male who presents for preventative health visit.     Patient has been advised of split billing requirements and indicates understanding: Yes  Healthy Habits:     Getting at least 3 servings of Calcium per day:  Yes    Bi-annual eye exam:  NO    Dental care twice a year:  NO    Sleep apnea or symptoms of sleep apnea:  None    Diet:  Regular (no restrictions)    Frequency of exercise:  6-7 days/week    Duration of exercise:  Greater than 60 minutes    Taking medications regularly:  Yes    Barriers to taking medications:  None    Medication side effects:  None    PHQ-2 Total Score: 0    Additional concerns today:  No    Ability to successfully perform activities of daily living: Yes, no assistance needed  Home safety:  none identified   Hearing impairment: none      Today's PHQ-2 Score:   PHQ-2 ( 1999 [employer]) 6/10/2020   Q1: Little interest or pleasure in doing things 0   Q2: Feeling down, depressed or hopeless 0   PHQ-2 Score 0       Abuse: Current or Past(Physical, Sexual or Emotional)- No  Do you feel safe in your environment? Yes        Social History     Tobacco Use     Smoking status: Never Smoker     Smokeless tobacco: Never Used   Substance Use Topics     Alcohol use: Yes     Alcohol/week: 2.0 standard drinks     Types: 2 Cans of beer per week     Comment: 1-2 drinks a week     If you drink alcohol do you typically have >3 drinks per day or >7 drinks per week? No    Alcohol Use 6/23/2021   Prescreen: >3 drinks/day or >7 drinks/week? No       Last PSA:   PSA   Date Value Ref Range Status   05/24/2018 1.22 0 - 4 ug/L Final     Comment:     Assay Method:  Chemiluminescence using Siemens Vista analyzer       Reviewed orders with patient. Reviewed health maintenance and updated orders accordingly - Yes  Labs reviewed in EPIC    Reviewed and updated as needed this visit by clinical staff   Allergies  Meds              Reviewed and updated as needed this visit by  "Provider                Past Medical History:   Diagnosis Date     HTN (hypertension)      Presbyopia      Seasonal allergies         Review of Systems  CONSTITUTIONAL: NEGATIVE for fever, chills, change in weight  INTEGUMENTARY/SKIN: NEGATIVE for worrisome rashes, moles or lesions  EYES: NEGATIVE for vision changes or irritation  ENT: NEGATIVE for ear, mouth and throat problems  RESP: NEGATIVE for significant cough or SOB  CV: NEGATIVE for chest pain, palpitations or peripheral edema  GI: NEGATIVE for nausea, abdominal pain, heartburn, or change in bowel habits   male: negative for dysuria, hematuria, decreased urinary stream, erectile dysfunction, urethral discharge  MUSCULOSKELETAL: NEGATIVE for significant arthralgias or myalgia  NEURO: NEGATIVE for weakness, dizziness or paresthesias  PSYCHIATRIC: NEGATIVE for changes in mood or affect    OBJECTIVE:   /87 (BP Location: Right arm, Patient Position: Sitting, Cuff Size: Adult Regular)   Pulse 60   Temp 96.9  F (36.1  C) (Temporal)   Ht 1.75 m (5' 8.9\")   Wt 90.3 kg (199 lb)   SpO2 99%   BMI 29.47 kg/m      Physical Exam  GENERAL: alert and no distress  EYES: Eyes grossly normal to inspection, PERRL and conjunctivae and sclerae normal  HENT: ear canals and TM's normal, nose and mouth without ulcers or lesions  NECK: no adenopathy, no asymmetry, masses, or scars and thyroid normal to palpation  RESP: lungs clear to auscultation - no rales, rhonchi or wheezes  CV: regular rate and rhythm, normal S1 S2, no S3 or S4, no murmur, click or rub, no peripheral edema and peripheral pulses strong  ABDOMEN: soft, nontender, no hepatosplenomegaly, no masses and bowel sounds normal  MS: no gross musculoskeletal defects noted, no edema  SKIN: no suspicious lesions or rashes  NEURO: Normal strength and tone, mentation intact and speech normal  PSYCH: mentation appears normal, affect normal/bright    Diagnostic Test Results:  Labs reviewed in Epic    ASSESSMENT/PLAN: " "  (Z00.00) Routine history and physical examination of adult  (primary encounter diagnosis)  (I10) Essential hypertension  Comment: stable.  Plan: amLODIPine (NORVASC) 10 MG tablet, Lipid panel         reflex to direct LDL Fasting, Hemoglobin A1c,         CBC with platelets and differential, PSA,         screen, Comprehensive metabolic panel (BMP +         Alb, Alk Phos, ALT, AST, Total. Bili, TP)      (G47.33) AWA (obstructive sleep apnea)  Comment: stable  Plan: SLEEP EVALUATION & MANAGEMENT REFERRAL - [facility]         [phone]  (Age 18 and up)      (E78.5) Hyperlipidemia LDL goal <100  Comment: stable  Plan: continue current therapy    (J30.2) Seasonal allergic rhinitis, unspecified trigger  Comment: stable  Plan: continue current therapy      Patient has been advised of split billing requirements and indicates understanding: Yes  COUNSELING:   Reviewed preventive health counseling, as reflected in patient instructions  Special attention given to:        Regular exercise       Healthy diet/nutrition    Estimated body mass index is 28.44 kg/m  as calculated from the following:    Height as of 12/23/19: 1.75 m (5' 8.9\").    Weight as of 12/23/19: 87.1 kg (192 lb).     Weight management plan: Discussed healthy diet and exercise guidelines    He reports that he has never smoked. He has never used smokeless tobacco.      Counseling Resources:  ATP IV Guidelines  Pooled Cohorts Equation Calculator  FRAX Risk Assessment  ICSI Preventive Guidelines  Dietary Guidelines for Americans, 2010  USDA's MyPlate  ASA Prophylaxis  Lung CA Screening    Tara Mehta MD  [facility]  "

## 2021-06-24 LAB — PSA SERPL-ACNC: 1.49 UG/L (ref 0–4)

## 2021-06-25 ENCOUNTER — VIRTUAL VISIT (OUTPATIENT)
Dept: FAMILY MEDICINE | Facility: CLINIC | Age: 61
End: 2021-06-25
Payer: COMMERCIAL

## 2021-06-25 DIAGNOSIS — I10 ESSENTIAL HYPERTENSION: Primary | ICD-10-CM

## 2021-06-25 DIAGNOSIS — E78.5 HYPERLIPIDEMIA LDL GOAL <100: ICD-10-CM

## 2021-06-25 DIAGNOSIS — J30.2 SEASONAL ALLERGIC RHINITIS, UNSPECIFIED TRIGGER: ICD-10-CM

## 2021-06-25 DIAGNOSIS — G47.33 OSA (OBSTRUCTIVE SLEEP APNEA): ICD-10-CM

## 2021-06-25 PROCEDURE — 99214 OFFICE O/P EST MOD 30 MIN: CPT | Mod: 95 | Performed by: INTERNAL MEDICINE

## 2021-06-25 NOTE — PROGRESS NOTES
"  Drew Bradford is a 60 year old male who is being evaluated via a billable telephone visit.      The patient has been notified of following:     \"This telephone visit will be conducted via a call between you and your physician/provider. We have found that certain health care needs can be provided without the need for a physical exam.  This service lets us provide the care you need with a short phone conversation.  If a prescription is necessary we can send it directly to your pharmacy.  If lab work is needed we can place an order for that and you can then stop by our lab to have the test done at a later time.    Telephone visits are billed at different rates depending on your insurance coverage. During this emergency period, for some insurers they may be billed the same as an in-person visit.  Please reach out to your insurance provider with any questions.    If during the course of the call the physician/provider feels a telephone visit is not appropriate, you will not be charged for this service.\"    Patient has given verbal consent for Telephone visit?  Yes    What phone number would you like to be contacted at? [phone] and  available [phone] option 1 then 0    How would you like to obtain your AVS? MyChart        Hypertension Follow-up      Do you check your blood pressure regularly outside of the clinic? Yes     Are you following a low salt diet? Yes    Are your blood pressures ever more than 140 on the top number (systolic) OR more   than 90 on the bottom number (diastolic), for example 140/90? No 128/81  No chest pain, headaches, fever or chills.          Current Medications:     Current Outpatient Medications   Medication Sig Dispense Refill     amLODIPine (NORVASC) 10 MG tablet Take 1 tablet (10 mg) by mouth daily 90 tablet 3     Calcium Carb-Cholecalciferol (CALCIUM + D3) 600-200 MG-UNIT TABS Take by mouth daily       Ginkgo Biloba Complex 440 MG CAPS Take 440 mg by mouth daily    "         Allergies:      Allergies   Allergen Reactions     Penicillins      Amoxicillin Rash     Back pain, kidney pain.             Past Medical History:     Past Medical History:   Diagnosis Date     HTN (hypertension)      Presbyopia      Seasonal allergies          Past Surgical History:     Past Surgical History:   Procedure Laterality Date     C REMOVAL OF KIDNEY STONE       COLONOSCOPY N/A 7/16/2018    Procedure: COLONOSCOPY;  colonoscopy;  Surgeon: Geremias Strickland MD;  Location:  GI     ESOPHAGOSCOPY, GASTROSCOPY, DUODENOSCOPY (EGD), COMBINED N/A 1/14/2020    Procedure: ESOPHAGOGASTRODUODENOSCOPY, WITH BIOPSY;  Surgeon: Tez Vences MD;  Location:  GI         Family Medical History:   No family history on file.      Social History:     Social History     Socioeconomic History     Marital status: Single     Spouse name: Not on file     Number of children: Not on file     Years of education: Not on file     Highest education level: Not on file   Occupational History     Not on file   Social Needs     Financial resource strain: Not on file     Food insecurity     Worry: Not on file     Inability: Not on file     Transportation needs     Medical: Not on file     Non-medical: Not on file   Tobacco Use     Smoking status: Never Smoker     Smokeless tobacco: Never Used   Substance and Sexual Activity     Alcohol use: Yes     Alcohol/week: 2.0 standard drinks     Types: 2 Cans of beer per week     Comment: 1-2 drinks a week     Drug use: No     Sexual activity: Yes     Partners: Female   Lifestyle     Physical activity     Days per week: Not on file     Minutes per session: Not on file     Stress: Not on file   Relationships     Social connections     Talks on phone: Not on file     Gets together: Not on file     Attends Sikh service: Not on file     Active member of club or organization: Not on file     Attends meetings of clubs or organizations: Not on file     Relationship status: Not on file      Intimate partner violence     Fear of current or ex partner: Not on file     Emotionally abused: Not on file     Physically abused: Not on file     Forced sexual activity: Not on file   Other Topics Concern     Parent/sibling w/ CABG, MI or angioplasty before 65F 55M? Not Asked   Social History Narrative     Not on file           Review of System:     Constitutional: Negative for fever or chills  Skin: Negative for rashes  Ears/Nose/Throat: positive for chronic seasonal allergic rhinitis symptoms  Respiratory: No shortness of breath, dyspnea on exertion, cough, or hemoptysis  Cardiovascular: Negative for chest pain  Gastrointestinal: Negative for nausea, vomiting  Genitourinary: Negative for dysuria, hematuria  Musculoskeletal: Negative for myalgias  Neurologic: Negative for headaches  Psychiatric: Negative for depression, anxiety  Hematologic/Lymphatic/Immunologic: Negative  Endocrine: Negative  Behavioral: Negative for tobacco use       Physical Exam:   There were no vitals taken for this visit.    Respiratory: no cough over the phone  Psych: mentation intact over the phone  Neuro: alert & oriented over the phone    Diagnostic Test Results:     Diagnostic Test Results:  Labs reviewed in [facility]    Recent Labs   Lab Test 06/23/21  1037 06/05/20  0919   CHOL 204* 184   HDL 47 43   * 109*   TRIG 113 160*         ASSESSMENT/PLAN:       (I10) Essential hypertension (primary encounter diagnosis)  Comment: BP is well controlled  Plan: continue amLODIPine (NORVASC) 10 MG tablet      (J30.2) Chronic seasonal allergic rhinitis  Comment: well controlled.   Plan: continue current therapy      (E78.5) Hyperlipidemia LDL goal <100  Comment: symptoms stable on Simvastatin.  Plan: continue current therapy      (G47.33) AWA (obstructive sleep apnea)  Comment:stable.  Plan: continue CPAP therapy      Follow Up Plan:     Patient is instructed to return to Internal Medicine clinic for follow-up visit in 3  months.        Tara Mehta MD      Phone call duration: 30 minutes

## 2021-07-05 NOTE — PROGRESS NOTES
CPAP Follow-Up Visit:    Date on this visit: 7/6/2021    Drew Bradford has a follow-up visit today to review his CPAP use for AWA. He was initially seen for nightly snoring, gasping and poor quality of sleep for more than 10 years. His medical history is significant for HTN. This appointment was conducted through an .    Previous Study Results:   Date: 1/19/19.  Weight 196 pounds.  AHI: 68/hr. RDI 70.4/hr. O2 angela 50%. He spent 38 minutes below 89%.  CPAP 9 cm was effective.    He feels the CPAP machine helps a lot. He is in need of new supplies. He would like to try a nasal pillows mask.     PAP machine: V[facility]oda (obtained 2/27/2019). Pressure settings: 9-12 cm    The compliance data shows that from 5/25/21-6/23/21 the patient used the CPAP for 28/30 nights, 80% of nights for >4 hours.  The 90th% pressure is 12 cm.  The average time in large leak is 2 min.  The average nightly usage is 6:39.  The average AHI is 2.3/hr.       Interface:  Mask: N20 mask. Had tried the F20.  Chin strap: No  Leak: No  Using Humidifier: Yes, does not run out overnight.   Condensation in hose or mask: No     Difficulties with therapy:    [-] Snoring with CPAP:  [-] Difficulty tolerating the pressure:  [-] Epistaxis/dry nose:   [-] Nasal congestion:  [-] Dry mouth: not too dry  [-] Mouth breathing:   [-] Pain/skin breakdown:      Improvements noted with CPAP:   [+] waking up more refreshed  [+] sleeping better with less arousals  [+] nocturia improved   [+] improved energy level during the day    Weight change since sleep study: 199 lbs, up 3 pounds since sleep study    Bedtime: 10-11 PM.  Wake time: 6-7:30 AM. Falls asleep in 5 minutes. Wakes 1-2 times per night for 5 minutes. Reason for waking: restroom  Naps: 0.       Past medical/surgical history, family history, social history, medications and allergies were reviewed.      Problem List:  Patient Active Problem List    Diagnosis Date Noted     Seasonal allergic rhinitis,  unspecified trigger 06/10/2020     Priority: Medium     Essential hypertension 06/10/2020     Priority: Medium     AWA (obstructive sleep apnea) 01/21/2019     Priority: Medium     1/19/2019 [place] Split Sleep Study (196.0 lbs) - AHI 68.1, RDI 70.4, Supine AHI 68.1, REM AHI 40.0, Low O2% 49.8%, Time Spent ?88% 38.0, Time Spent ?89% 43.9. Treatment was titrated to a pressure of CPAP 9 with an AHI 0.8. Time spent in REM supine at this pressure was 57.0 minutes.       Hyperlipidemia LDL goal <100 11/15/2018     Priority: Medium        Impression/Plan:    (G47.33) AWA (obstructive sleep apnea)  Comment: Mr. Bradford is doing well with CPAP. He would like to try a nasal pillows mask.   Plan: Comprehensive DME        Continue auto CPAP 9-12 cm. A prescription was written for new supplies. We reviewed recommendations for cleaning and replacing supplies.   He was given information about the recall of Respironics machines. He was encouraged to get an in-line bacterial filter for his machine and continue to use it until it can be replaced/repaired.       He will follow up with me in about 1 year(s).     Phone visit duration: 28 min     Bennett Goltz, PA-C    CC: Tara Mehta

## 2021-07-06 ENCOUNTER — VIRTUAL VISIT (OUTPATIENT)
Dept: SLEEP MEDICINE | Facility: CLINIC | Age: 61
End: 2021-07-06
Attending: INTERNAL MEDICINE
Payer: COMMERCIAL

## 2021-07-06 VITALS — BODY MASS INDEX: 29.47 KG/M2 | WEIGHT: 199 LBS | HEIGHT: 69 IN

## 2021-07-06 DIAGNOSIS — G47.33 OSA (OBSTRUCTIVE SLEEP APNEA): ICD-10-CM

## 2021-07-06 PROCEDURE — 99213 OFFICE O/P EST LOW 20 MIN: CPT | Mod: 95 | Performed by: PHYSICIAN ASSISTANT

## 2021-07-06 ASSESSMENT — MIFFLIN-ST. JEOR: SCORE: 1698.04

## 2021-07-06 NOTE — PROGRESS NOTES
"Drew Bradford is a 60 year old male being evaluated via a billable telephone visit.     \"This telephone visit will be conducted via a call between you and your physician/provider. We have found that certain health care needs can be provided without the need for an in-person visit or physical exam.  This service lets us provide the care you need with a telephone conversation.  If a prescription is necessary we can send it directly to your pharmacy.  If lab work is needed we can place an order for that and you can then stop by our lab to have the test done at a later time.\"    Telephone visits are billed at different rates depending on your insurance coverage.  Please reach out to your insurance provider with any questions.    Patient has given verbal consent for  a Telephone visit? Yes    What telephone number would you like your provider to contact at at:  [phone]    How would you like to obtain your AVS? email    Ann Orozco CMA    Telephone Visit Details:     Telephone Visit Start Time: 8:33 AM    Telephone Visit End Time:9:01 AM      "

## 2021-07-06 NOTE — PATIENT INSTRUCTIONS
Your sleep apnea treatment may be affected by device recall    Our records show that you may have a Stefani Respironics CPAP for the treatment of sleep apnea. Many of these devices have been recalled* by the  for replacement. [facility] recommends:     1) If you are using a Resmed device, continue using the device.  2) If you have a Stefani Respironics device, register your device for confirmation of type of device and repair of the device at https://www.[employer]/healthcare/e/sleep/communications/src-update -if you cannot use link, call [phone].  The website will assist you in obtaining the serial number for registration.   3) If you are using a Stefani Respironics CPAP or Bilevel PAP device and you do not have immediate breathing, driving or cardiovascular risks without the device, consider stopping use of the device after verification that is has been recalled. Discuss this decision with your medical provider if you are uncertain about your medical risks.  4) If you are not using Respironics CPAP but are using a Respironics advanced device for breathing support (AVAPS, ASV, Bilevel PAP), continue using the device and review 5 and 6 below).     5) If you continue the device, do not include ozone generating  connected to PAP devices.  6) f you continue the device, you may choose to use a bacterial filter to reduce particulates from the device although on CPAP devices, pressures may need to be increased with the filter in place. These filters are not as effective as repair of the device.     ?       You may also choose discuss with your provider alternative approaches to treatment.      *[employer] is voluntarily recalling the below devices due to two (2) issues related to the polyester-based polyurethane (PE-PUR) sound abatement foam used in Stefani Continuous and Non-Continuous Ventilators: 1) PE-PUR foam may degrade into particles which may enter the  device's the air pathway and be ingested or inhaled by the user, and 2) the PE-PUR foam may off-gas certain chemicals. The foam degradation may be exacerbated by use of unapproved cleaning methods, such as ozone (see FDA safety communication on use of Ozone ), and off-gassing may occur during initial operation and may possibly continue throughout the device's useful life.   These issues can result in serious injury which can be life-threatening, cause permanent impairment, and/or require medical intervention to preclude permanent impairment. To date, [employer] has received several complaints regarding the presence of black debris/particles within the airpath circuit (extending from the device outlet, humidifier, tubing, and mask). [employer] also has received reports of headache, upper airway irritation, cough, chest pressure and sinus infection. The potential risks of particulate exposure include: Irritation (skin, eye, and respiratory tract), inflammatory response, headache, asthma, adverse effects to other organs (e.g. kidneys and liver) and toxic carcinogenic affects. The potential risks of chemical exposure due to off-gassing include: headache/dizziness, irritation (eyes, nose, respiratory tract, skin), hypersensitivity, nausea/vomiting, toxic and carcinogenic effects. There have been no reports of death as a result of these issues.    Actions to be taken:  Discontinue the use of your device.  Do not continue to use ozone  with the device.     [place] affected devices on the recall website, www.[employer]/SRC-update    i. The website provides current information on the status of the recall and how  to receive permanent corrective action to address the two issues.    ii. The website also provides instructions on how to locate an affected device  Serial Number and will guide users through the registration process.    iii. In the [place], call [phone] Service Hotline if you cannot visit the  website              Your BMI is Body mass index is 29.39 kg/m .  Weight management is a personal decision.  If you are interested in exploring weight loss strategies, the following discussion covers the approaches that may be successful. Body mass index (BMI) is one way to tell whether you are at a healthy weight, overweight, or obese. It measures your weight in relation to your height.  A BMI of 18.5 to 24.9 is in the healthy range. A person with a BMI of 25 to 29.9 is considered overweight, and someone with a BMI of 30 or greater is considered obese. More than two-thirds of American adults are considered overweight or obese.  Being overweight or obese increases the risk for further weight gain. Excess weight may lead to heart disease and diabetes.  Creating and following plans for healthy eating and physical activity may help you improve your health.  Weight control is part of healthy lifestyle and includes exercise, emotional health, and healthy eating habits. Careful eating habits lifelong are the mainstay of weight control. Though there are significant health benefits from weight loss, long-term weight loss with diet alone may be very difficult to achieve- studies show long-term success with dietary management in less than 10% of people. Attaining a healthy weight may be especially difficult to achieve in those with severe obesity. In some cases, medications, devices and surgical management might be considered.  What can you do?  If you are overweight or obese and are interested in methods for weight loss, you should discuss this with your provider.     Consider reducing daily calorie intake by 500 calories.     Keep a food journal.     Avoiding skipping meals, consider cutting portions instead.    Diet combined with exercise helps maintain muscle while optimizing fat loss. Strength training is particularly important for building and maintaining muscle mass. Exercise helps reduce stress, increase energy, and  improves fitness. Increasing exercise without diet control, however, may not burn enough calories to loose weight.       Start walking three days a week 10-20 minutes at a time    Work towards walking thirty minutes five days a week     Eventually, increase the speed of your walking for 1-2 minutes at time    In addition, we recommend that you review healthy lifestyles and methods for weight loss available through the National Institutes of Health patient information sites:  http://win.niddk.nih.gov/publications/index.htm    And look into health and wellness programs that may be available through your health insurance provider, employer, local community center, or [employer]je[employer] club.    Weight management plan: Patient was referred to their PCP to discuss a diet and exercise plan.

## 2021-10-09 ENCOUNTER — HEALTH MAINTENANCE LETTER (OUTPATIENT)
Age: 61
End: 2021-10-09

## 2021-10-19 ENCOUNTER — OFFICE VISIT (OUTPATIENT)
Dept: URGENT CARE | Facility: URGENT CARE | Age: 61
End: 2021-10-19
Payer: COMMERCIAL

## 2021-10-19 VITALS
TEMPERATURE: 97.7 F | SYSTOLIC BLOOD PRESSURE: 135 MMHG | BODY MASS INDEX: 28.65 KG/M2 | WEIGHT: 194 LBS | DIASTOLIC BLOOD PRESSURE: 90 MMHG | HEART RATE: 66 BPM

## 2021-10-19 DIAGNOSIS — K12.0 CANKER SORE: Primary | ICD-10-CM

## 2021-10-19 PROCEDURE — 99213 OFFICE O/P EST LOW 20 MIN: CPT | Performed by: FAMILY MEDICINE

## 2021-10-19 RX ORDER — TRIAMCINOLONE ACETONIDE 0.1 %
PASTE (GRAM) DENTAL 3 TIMES DAILY
Qty: 5 G | Refills: 1 | Status: SHIPPED | OUTPATIENT
Start: 2021-10-19 | End: 2021-11-18

## 2021-10-19 NOTE — PROGRESS NOTES
SUBJECTIVE: Drew Bradford is a 61 year old male presenting with a chief complaint of sore on gumline.  Onset of symptoms was day(s) ago.  Course of illness is worsening.      Past Medical History:   Diagnosis Date     HTN (hypertension)      Presbyopia      Seasonal allergies      Allergies   Allergen Reactions     Penicillins      Amoxicillin Rash     Back pain, kidney pain.      Social History     Tobacco Use     Smoking status: Never Smoker     Smokeless tobacco: Never Used   Substance Use Topics     Alcohol use: Yes     Alcohol/week: 2.0 standard drinks     Types: 2 Cans of beer per week     Comment: 1-2 drinks a week       ROS:  SKIN: no rash  GI: no vomiting    OBJECTIVE:  BP (!) 135/90   Pulse 66   Temp 97.7  F (36.5  C) (Tympanic)   Wt 88 kg (194 lb)   BMI 28.65 kg/m  GENERAL APPEARANCE: healthy, alert and no distress  EYES: EOMI,  PERRL, conjunctiva clear  HENT: oral mucous membranes moist, no erythema noted and ulcer   SKIN: no suspicious lesions or rashes      ICD-10-CM    1. Canker sore  K12.0 triamcinolone (KENALOG) 0.1 % paste       Fluids/Rest, f/u if worse/not any better

## 2021-10-19 NOTE — PATIENT INSTRUCTIONS
Patient Education     Understanding Canker Sores  Canker sores (aphthous ulcers) are small, painful sores in the mouth. They occur most often on the tongue, gums, or insides of the cheeks.   What causes a canker sore?  The exact cause of canker sores is not known. But they are linked to different conditions. These include:     An injury or irritation in the mouth, such as biting the inside of your cheek or braces rubbing    Allergy or sensitivity to certain foods or substances, such as citrus juice or some kinds of toothpaste    Poor nutrition    Emotional stress    Certain infections and illnesses  Canker sores often run in families.  What are the symptoms of a canker sore?  These are some common symptoms of canker sores:    Sores are open and grayish-yellow, surrounded by redness.    Sores are often painful and sensitive to touch.    There may be a burning or tingling feeling a few hours to a few days before the sore appears.    Children and teens tend to get canker sores more often than adults.  How are canker sores treated?  Canker sores often go away by themselves within 10 to 14 days. There is no cure for canker sores. Treatment focuses on easing symptoms and shortening outbreaks. Treatments may include:     Prescription or other-the-counter topical treatments. These medicines are put right on the sores. Topical steroids may protect the canker sores from more irritation and allow them to heal. Topical pain relief medicines may numb the area and make the sores less painful.    Certain types of toothpaste. These don't contain sodium lauryl sulfate. This type of toothpaste may prevent further aggravation of canker sores.    Oral prescription medicines. Oral medicines are taken by mouth. These are used for severe cases to help ease symptoms.    Prescription or over-the-counter pain medicines.  These help with mild pain.  What are possible complications of a canker sore?   Mouth sores that seem to be canker sores  can be signs of a more serious illness. If you have other signs of illness along with mouth sores, talk with a healthcare provider. Canker sores can be so painful that they cause problems with talking, eating, or drinking.   When should I call my healthcare provider?  Call your healthcare provider right away if you have any of these:    Canker sores that don t go away after 2 weeks    Canker sores that come back more than 3 times a year    Canker sores that are larger than about a half-inch across    Fever of 100.4 F (38 C) or higher, or as directed    Pain that gets worse    You aren t able to eat or drink because of painful sores    Symptoms that don t get better, or symptoms that get worse    New symptoms  Mercedes last reviewed this educational content on 6/1/2019 2000-2021 [employer]. All rights reserved. This information is not intended as a substitute for professional medical care. Always follow your healthcare professional's instructions.

## 2021-12-16 ENCOUNTER — OFFICE VISIT (OUTPATIENT)
Dept: FAMILY MEDICINE | Facility: CLINIC | Age: 61
End: 2021-12-16
Payer: COMMERCIAL

## 2021-12-16 ENCOUNTER — ANCILLARY PROCEDURE (OUTPATIENT)
Dept: GENERAL RADIOLOGY | Facility: CLINIC | Age: 61
End: 2021-12-16
Attending: PHYSICIAN ASSISTANT
Payer: COMMERCIAL

## 2021-12-16 VITALS
SYSTOLIC BLOOD PRESSURE: 140 MMHG | BODY MASS INDEX: 29.47 KG/M2 | RESPIRATION RATE: 16 BRPM | HEART RATE: 70 BPM | TEMPERATURE: 97.3 F | OXYGEN SATURATION: 96 % | HEIGHT: 69 IN | WEIGHT: 199 LBS | DIASTOLIC BLOOD PRESSURE: 88 MMHG

## 2021-12-16 DIAGNOSIS — M77.11 RIGHT LATERAL EPICONDYLITIS: ICD-10-CM

## 2021-12-16 DIAGNOSIS — Z23 HIGH PRIORITY FOR 2019-NCOV VACCINE: ICD-10-CM

## 2021-12-16 DIAGNOSIS — M77.11 RIGHT LATERAL EPICONDYLITIS: Primary | ICD-10-CM

## 2021-12-16 DIAGNOSIS — I10 ESSENTIAL HYPERTENSION: ICD-10-CM

## 2021-12-16 PROCEDURE — 91300 COVID-19,PF,PFIZER (12+ YRS): CPT | Performed by: PHYSICIAN ASSISTANT

## 2021-12-16 PROCEDURE — 73080 X-RAY EXAM OF ELBOW: CPT | Mod: RT | Performed by: RADIOLOGY

## 2021-12-16 PROCEDURE — 0004A COVID-19,PF,PFIZER (12+ YRS): CPT | Performed by: PHYSICIAN ASSISTANT

## 2021-12-16 PROCEDURE — 99213 OFFICE O/P EST LOW 20 MIN: CPT | Performed by: PHYSICIAN ASSISTANT

## 2021-12-16 ASSESSMENT — PAIN SCALES - GENERAL: PAINLEVEL: SEVERE PAIN (6)

## 2021-12-16 ASSESSMENT — MIFFLIN-ST. JEOR: SCORE: 1693.04

## 2021-12-16 NOTE — RESULT ENCOUNTER NOTE
Team - please let patient know x-ray did not show any fracture or masses, follow-up with ortho as planned. Thanks.

## 2021-12-16 NOTE — PROGRESS NOTES
"Assessment and Plan:     (M77.11) Right lateral epicondylitis  (primary encounter diagnosis)  Comment: pain x months, using warm compress, tylenol as needed, no injury  Plan: XR Elbow Right G/E 3 Views, Orthopedic          Referral  Given duration of symptoms, will refer to ortho    (I10) Essential hypertension  Comment: on amlodipine 10mg, recent weight gain  Plan: Continue to work on weight loss, I recommended he start walking at least 30 minutes daily, decrease sodium in diet, check blood pressure daily and bring readings to visit with Dr. Mehta in about 2 months    (Z23) High priority for 2019-nCoV vaccine  Comment: Received first dose in [place], would like Pfizer booster today  Plan: COVID-19,PF,PFIZER (12+ Yrs PURPLE LABEL)        Beverly Og PA-C        Mandy Russell is a 61 year old who presents for the following health issues     HPI     Chief Complaint   Patient presents with     Musculoskeletal Problem     right elbow joint pain x 6 months ago     Coronavac Inactivated vaccine per pt   04/15/21  05/21/21    No injury that he can recall  He hasn't tried taking anything for it  He hasn't tried any physical therapy for it  He works as a  for a [employer] service, he does not think driving has worsened his elbow  The pain worsens with extension  He denies numbness/tingling in the hand/fingers     BP is high today, he is taking amlodipine 10mg  He checks BP at home runs 130-140/80-90    He would like to get a booster today it has been >6 mons since his first two doses, he had a vaccine in [place], prefers Pfizer    Review of Systems   See above      Objective    BP (!) 149/85 (BP Location: Left arm, Patient Position: Sitting, Cuff Size: Adult Regular)   Pulse 70   Temp 97.3  F (36.3  C) (Temporal)   Resp 16   Ht 1.753 m (5' 9\")   Wt 90.3 kg (199 lb)   SpO2 96%   BMI 29.39 kg/m    Body mass index is 29.39 kg/m .     Physical Exam     GENERAL: healthy, alert and no distress  RESP: " lungs clear to auscultation - no rales, no rhonchi, no wheezes  CV: regular rates and rhythm, normal S1 S2, no S3 or S4 and no murmur, no click or rub   MS: extremities- full ROM of right elbow, pain with full flexion, tender over lateral epicondyle, no swelling, erythema or skin changes, normal radial and ulnar pulses, full ROM of right wirst  SKIN: no suspicious lesions, no rashes    Right elbow XR: ?calcific tendonitis vs old chip fracture medial epicondyle, await official read

## 2021-12-16 NOTE — LETTER
December 16, 2021      Drew Bradford  [address] [facility] [address]        Dear ,    We are writing to inform you of your test results.    Your x-ray did not show any fracture or masses, follow-up with ortho as planned. Thanks.       Resulted Orders   XR Elbow Right G/E 3 Views    Narrative    XR ELBOW RT G/E 3 VIEWS   12/16/2021 3:51 PM     HISTORY:  Right lateral epicondylitis      Impression    IMPRESSION: Chronic ossification/enthesopathy at the medial  epicondyle. Otherwise unremarkable.    NANY LUNSFORD MD         SYSTEM ID:  [id number]       If you have any questions or concerns, please call the clinic at the number listed above.       Sincerely,      Beverly Og PA-C

## 2021-12-16 NOTE — PATIENT INSTRUCTIONS
Elbow x-ray today    Orthopedic referral    DASH diet, cut down on salt intake    Try to walk daily at least 30 minutes per day for weight loss

## 2022-05-20 ENCOUNTER — OFFICE VISIT (OUTPATIENT)
Dept: FAMILY MEDICINE | Facility: CLINIC | Age: 62
End: 2022-05-20
Payer: COMMERCIAL

## 2022-05-20 VITALS
HEIGHT: 69 IN | HEART RATE: 64 BPM | TEMPERATURE: 97.2 F | BODY MASS INDEX: 28.88 KG/M2 | RESPIRATION RATE: 16 BRPM | OXYGEN SATURATION: 98 % | SYSTOLIC BLOOD PRESSURE: 139 MMHG | WEIGHT: 195 LBS | DIASTOLIC BLOOD PRESSURE: 82 MMHG

## 2022-05-20 DIAGNOSIS — I10 ESSENTIAL HYPERTENSION: ICD-10-CM

## 2022-05-20 DIAGNOSIS — R35.1 NOCTURIA: ICD-10-CM

## 2022-05-20 DIAGNOSIS — Z76.89 ESTABLISHING CARE WITH NEW DOCTOR, ENCOUNTER FOR: ICD-10-CM

## 2022-05-20 DIAGNOSIS — Z76.0 ENCOUNTER FOR MEDICATION REFILL: Primary | ICD-10-CM

## 2022-05-20 DIAGNOSIS — Z12.11 SCREEN FOR COLON CANCER: ICD-10-CM

## 2022-05-20 DIAGNOSIS — Z13.220 SCREENING FOR HYPERLIPIDEMIA: ICD-10-CM

## 2022-05-20 DIAGNOSIS — Z23 HIGH PRIORITY FOR 2019-NCOV VACCINE: ICD-10-CM

## 2022-05-20 PROCEDURE — 91305 COVID-19,PF,PFIZER (12+ YRS): CPT | Performed by: INTERNAL MEDICINE

## 2022-05-20 PROCEDURE — 99214 OFFICE O/P EST MOD 30 MIN: CPT | Mod: 25 | Performed by: INTERNAL MEDICINE

## 2022-05-20 PROCEDURE — 0054A COVID-19,PF,PFIZER (12+ YRS): CPT | Performed by: INTERNAL MEDICINE

## 2022-05-20 RX ORDER — TAMSULOSIN HYDROCHLORIDE 0.4 MG/1
0.4 CAPSULE ORAL DAILY
Qty: 90 CAPSULE | Refills: 3 | Status: SHIPPED | OUTPATIENT
Start: 2022-05-20 | End: 2023-12-15

## 2022-05-20 RX ORDER — AMLODIPINE BESYLATE 10 MG/1
10 TABLET ORAL DAILY
COMMUNITY
Start: 2022-04-11 | End: 2022-05-20

## 2022-05-20 RX ORDER — METRONIDAZOLE 500 MG/1
500 TABLET ORAL 2 TIMES DAILY
Qty: 28 TABLET | Refills: 3 | Status: SHIPPED | OUTPATIENT
Start: 2022-05-20 | End: 2022-06-03

## 2022-05-20 RX ORDER — AMLODIPINE BESYLATE 10 MG/1
10 TABLET ORAL DAILY
Qty: 90 TABLET | Refills: 3 | Status: SHIPPED | OUTPATIENT
Start: 2022-05-20 | End: 2023-11-13

## 2022-05-20 ASSESSMENT — PAIN SCALES - GENERAL: PAINLEVEL: MODERATE PAIN (4)

## 2022-05-20 NOTE — PROGRESS NOTES
Mandy Russell is a 61 year old who presents for the following health issues  accompanied by his spouse.    Chief Complaint:     Follow up on multiple concerns including hypertension, hyperlipidemia, medication refills    History of Present Illness       Reason for visit:  Inflammation in mouth  Symptom onset:  More than a month  Symptom intensity:  Severe  Symptom progression:  Staying the same  Had these symptoms before:  No  What makes it worse:  No  What makes it better:  No    He eats 2-3 servings of fruits and vegetables daily.He consumes 0 sweetened beverage(s) daily.He exercises with enough effort to increase his heart rate 20 to 29 minutes per day.  He exercises with enough effort to increase his heart rate 3 or less days per week.   He is taking medications regularly.           Current Medications:     Current Outpatient Medications   Medication Sig Dispense Refill     amLODIPine (NORVASC) 10 MG tablet Take 1 tablet (10 mg) by mouth daily Take 10 mg by mouth daily 90 tablet 3     CALCIUM PO Take 1 tablet by mouth daily       GINKGO BILOBA PO Take 1 tablet by mouth daily       metroNIDAZOLE (FLAGYL) 500 MG tablet Take 1 tablet (500 mg) by mouth 2 times daily for 14 days 28 tablet 3     tamsulosin (FLOMAX) 0.4 MG capsule Take 1 capsule (0.4 mg) by mouth daily 90 capsule 3         Allergies:      Allergies   Allergen Reactions     Cephalosporins Anaphylaxis     Penicillin G Anaphylaxis     Amoxicillin Cramps     Kidney problem     Fish Hives     Shrimp Hives     Misc. Sulfonamide Containing Compounds             Past Medical History:     Past Medical History:   Diagnosis Date     HTN (hypertension)      Hyperlipidemia LDL goal <100      Nocturia          Past Surgical History:   No past surgical history on file.      Family Medical History:     Family History   Problem Relation Age of Onset     No Known Problems Mother      No Known Problems Father          Social History:     Social History  "    Socioeconomic History     Marital status:      Spouse name: Not on file     Number of children: Not on file     Years of education: Not on file     Highest education level: Not on file   Occupational History     Not on file   Tobacco Use     Smoking status: Former Smoker     Smokeless tobacco: Never Used   Substance and Sexual Activity     Alcohol use: Yes     Alcohol/week: 2.0 standard drinks     Types: 2 Cans of beer per week     Comment: 2 cans of beer a week     Drug use: Never     Sexual activity: Yes     Partners: Female   Other Topics Concern     Not on file   Social History Narrative     Not on file     Social Determinants of Health     Financial Resource Strain: Not on file   Food Insecurity: Not on file   Transportation Needs: Not on file   Physical Activity: Not on file   Stress: Not on file   Social Connections: Not on file   Intimate Partner Violence: Not on file   Housing Stability: Not on file           Review of System:     Constitutional: Negative for fever or chills  Skin: Negative for rashes  Ears/Nose/Throat: Negative for nasal congestion, sore throat  Respiratory: No shortness of breath, dyspnea on exertion, cough, or hemoptysis  Cardiovascular: Negative for chest pain  Gastrointestinal: Negative for nausea, vomiting  Genitourinary: Negative for dysuria, hematuria, positive for nocturia  Musculoskeletal: Negative for myalgias  Neurologic: Negative for headaches  Psychiatric: Negative for depression, anxiety  Hematologic/Lymphatic/Immunologic: Negative  Endocrine: Negative  Behavioral: Negative for tobacco use       Physical Exam:   /82 (BP Location: Right arm, Patient Position: Sitting, Cuff Size: Adult Regular)   Pulse 64   Temp 97.2  F (36.2  C) (Temporal)   Resp 16   Ht 1.748 m (5' 8.8\")   Wt 88.5 kg (195 lb)   SpO2 98%   BMI 28.96 kg/m      GENERAL: alert and no distress  EYES: eyes grossly normal to inspection, and conjunctivae and sclerae normal  HENT: Normocephalic " atraumatic. Nose and mouth without ulcers or lesions  NECK: supple  RESP: lungs clear to auscultation   CV: regular rate and rhythm, normal S1 S2  LYMPH: no peripheral edema   ABDOMEN: nondistended  MS: no gross musculoskeletal defects noted  SKIN: no suspicious lesions or rashes  NEURO: Alert & Oriented x 3.   PSYCH: mentation appears normal, affect normal        Diagnostic Test Results:     Diagnostic Test Results:  Labs reviewed in Epic    ASSESSMENT/PLAN:       Drew was seen today for hypertension, mouth problem, establish care and imm/inj.    Diagnoses and all orders for this visit:    Essential hypertension  -     amLODIPine (NORVASC) 10 MG tablet; Take 1 tablet (10 mg) by mouth daily Take 10 mg by mouth daily    Screen for colon cancer  -     Adult Gastro Ref - Procedure Only; Future    Hyperlipidemia  - stable  - diet, exercise    Encounter for medication refill  -     metroNIDAZOLE (FLAGYL) 500 MG tablet; Take 1 tablet (500 mg) by mouth 2 times daily for 14 days    Nocturia  -     tamsulosin (FLOMAX) 0.4 MG capsule; Take 1 capsule (0.4 mg) by mouth daily    High priority for 2019-nCoV vaccine  -     COVID-19,PF,PFIZER (12+ Yrs GRAY LABEL)    Other orders  -     REVIEW OF HEALTH MAINTENANCE PROTOCOL ORDERS            Follow Up Plan:     Patient is instructed to return to Internal Medicine clinic for follow-up visit in 1 month.        Tara Mehta MD  Internal Medicine  [facility]

## 2022-07-13 ENCOUNTER — OFFICE VISIT (OUTPATIENT)
Dept: FAMILY MEDICINE | Facility: CLINIC | Age: 62
End: 2022-07-13

## 2022-07-13 VITALS
SYSTOLIC BLOOD PRESSURE: 128 MMHG | HEART RATE: 61 BPM | TEMPERATURE: 97.7 F | RESPIRATION RATE: 16 BRPM | WEIGHT: 193 LBS | DIASTOLIC BLOOD PRESSURE: 86 MMHG | OXYGEN SATURATION: 99 % | HEIGHT: 69 IN | BODY MASS INDEX: 28.58 KG/M2

## 2022-07-13 DIAGNOSIS — Z00.00 ROUTINE HISTORY AND PHYSICAL EXAMINATION OF ADULT: Primary | ICD-10-CM

## 2022-07-13 DIAGNOSIS — I10 ESSENTIAL HYPERTENSION: ICD-10-CM

## 2022-07-13 LAB
BASOPHILS # BLD AUTO: 0 10E3/UL (ref 0–0.2)
BASOPHILS NFR BLD AUTO: 0 %
EOSINOPHIL # BLD AUTO: 0.3 10E3/UL (ref 0–0.7)
EOSINOPHIL NFR BLD AUTO: 5 %
ERYTHROCYTE [DISTWIDTH] IN BLOOD BY AUTOMATED COUNT: 11.6 % (ref 10–15)
HBA1C MFR BLD: 5.7 % (ref 0–5.6)
HCT VFR BLD AUTO: 47.5 % (ref 40–53)
HGB BLD-MCNC: 16 G/DL (ref 13.3–17.7)
IMM GRANULOCYTES # BLD: 0 10E3/UL
IMM GRANULOCYTES NFR BLD: 0 %
LYMPHOCYTES # BLD AUTO: 1.4 10E3/UL (ref 0.8–5.3)
LYMPHOCYTES NFR BLD AUTO: 18 %
MCH RBC QN AUTO: 30.2 PG (ref 26.5–33)
MCHC RBC AUTO-ENTMCNC: 33.7 G/DL (ref 31.5–36.5)
MCV RBC AUTO: 90 FL (ref 78–100)
MONOCYTES # BLD AUTO: 0.4 10E3/UL (ref 0–1.3)
MONOCYTES NFR BLD AUTO: 6 %
NEUTROPHILS # BLD AUTO: 5.3 10E3/UL (ref 1.6–8.3)
NEUTROPHILS NFR BLD AUTO: 71 %
PLATELET # BLD AUTO: 163 10E3/UL (ref 150–450)
RBC # BLD AUTO: 5.29 10E6/UL (ref 4.4–5.9)
WBC # BLD AUTO: 7.5 10E3/UL (ref 4–11)

## 2022-07-13 PROCEDURE — 80061 LIPID PANEL: CPT | Performed by: INTERNAL MEDICINE

## 2022-07-13 PROCEDURE — 80048 BASIC METABOLIC PNL TOTAL CA: CPT | Performed by: INTERNAL MEDICINE

## 2022-07-13 PROCEDURE — 84443 ASSAY THYROID STIM HORMONE: CPT | Performed by: INTERNAL MEDICINE

## 2022-07-13 PROCEDURE — 99396 PREV VISIT EST AGE 40-64: CPT | Performed by: INTERNAL MEDICINE

## 2022-07-13 PROCEDURE — 83036 HEMOGLOBIN GLYCOSYLATED A1C: CPT | Performed by: INTERNAL MEDICINE

## 2022-07-13 PROCEDURE — G0103 PSA SCREENING: HCPCS | Performed by: INTERNAL MEDICINE

## 2022-07-13 PROCEDURE — 36415 COLL VENOUS BLD VENIPUNCTURE: CPT | Performed by: INTERNAL MEDICINE

## 2022-07-13 PROCEDURE — 85025 COMPLETE CBC W/AUTO DIFF WBC: CPT | Performed by: INTERNAL MEDICINE

## 2022-07-13 RX ORDER — AMLODIPINE BESYLATE 10 MG/1
10 TABLET ORAL DAILY
Qty: 90 TABLET | Refills: 3 | Status: SHIPPED | OUTPATIENT
Start: 2022-07-13 | End: 2023-08-25

## 2022-07-13 ASSESSMENT — PAIN SCALES - GENERAL: PAINLEVEL: NO PAIN (0)

## 2022-07-13 NOTE — PROGRESS NOTES
SUBJECTIVE:   CC: Drew Bradford is an 61 year old male who presents for preventative health visit.     Patient has been advised of split billing requirements and indicates understanding: Yes  Healthy Habits:    Getting at least 3 servings of Calcium per day:  Yes    Bi-annual eye exam:  NO    Dental care twice a year:  NO    Sleep apnea or symptoms of sleep apnea:  None    Diet:  Regular (no restrictions)    Frequency of exercise:  6-7 days/week    Duration of exercise:  Greater than 60 minutes    Taking medications regularly:  Yes    Barriers to taking medications:  None    Medication side effects:  None    PHQ-2 Total Score:    Additional concerns today:  No    Ability to successfully perform activities of daily living: Yes, no assistance needed  Home safety:  none identified   Hearing impairment: none      Today's PHQ-2 Score:   PHQ-2 ( 1999 Pfizer) 5/20/2022   Q1: Little interest or pleasure in doing things 0   Q2: Feeling down, depressed or hopeless 0   PHQ-2 Score 0   PHQ-2 Total Score (12-17 Years)- Positive if 3 or more points; Administer PHQ-A if positive -   Q1: Little interest or pleasure in doing things Not at all   Q2: Feeling down, depressed or hopeless Not at all   PHQ-2 Score 0       Abuse: Current or Past(Physical, Sexual or Emotional)- No  Do you feel safe in your environment? Yes        Social History     Tobacco Use     Smoking status: Former Smoker     Smokeless tobacco: Never Used   Substance Use Topics     Alcohol use: Yes     Alcohol/week: 2.0 standard drinks     Types: 2 Cans of beer per week     Comment: 2 cans of beer a week     If you drink alcohol do you typically have >3 drinks per day or >7 drinks per week? No    Alcohol Use 6/23/2021   Prescreen: >3 drinks/day or >7 drinks/week? No       Last PSA:   PSA   Date Value Ref Range Status   06/23/2021 1.49 0 - 4 ug/L Final     Comment:     Assay Method:  Chemiluminescence using Siemens Vista analyzer       Reviewed orders with patient.  "Reviewed health maintenance and updated orders accordingly - Yes  Labs reviewed in EPIC    Reviewed and updated as needed this visit by clinical staff   Tobacco                  Reviewed and updated as needed this visit by Provider                   Past Medical History:   Diagnosis Date     HTN (hypertension)      Hyperlipidemia LDL goal <100      Nocturia      Presbyopia      Seasonal allergies         Review of Systems  CONSTITUTIONAL: NEGATIVE for fever, chills, change in weight  INTEGUMENTARY/SKIN: NEGATIVE for worrisome rashes, moles or lesions  EYES: NEGATIVE for vision changes or irritation  ENT: NEGATIVE for ear, mouth and throat problems  RESP: NEGATIVE for significant cough or SOB  CV: NEGATIVE for chest pain, palpitations or peripheral edema  GI: NEGATIVE for nausea, abdominal pain, heartburn, or change in bowel habits   male: negative for dysuria, hematuria, decreased urinary stream, erectile dysfunction, urethral discharge  MUSCULOSKELETAL: NEGATIVE for significant arthralgias or myalgia  NEURO: NEGATIVE for weakness, dizziness or paresthesias  PSYCHIATRIC: NEGATIVE for changes in mood or affect    OBJECTIVE:   /86 (BP Location: Left arm, Patient Position: Sitting, Cuff Size: Adult Large)   Pulse 61   Temp 97.7  F (36.5  C) (Temporal)   Resp 16   Ht 1.748 m (5' 8.8\")   Wt 87.5 kg (193 lb)   SpO2 99%   BMI 28.67 kg/m      Physical Exam  GENERAL: alert and no distress  EYES: Eyes grossly normal to inspection, PERRL and conjunctivae and sclerae normal  HENT: ear canals and TM's normal, nose and mouth without ulcers or lesions  NECK: no adenopathy, no asymmetry, masses, or scars and thyroid normal to palpation  RESP: lungs clear to auscultation - no rales, rhonchi or wheezes  CV: regular rate and rhythm, normal S1 S2, no S3 or S4, no murmur, click or rub, no peripheral edema and peripheral pulses strong  ABDOMEN: soft, nontender, no hepatosplenomegaly, no masses and bowel sounds normal  MS: " "no gross musculoskeletal defects noted, no edema  SKIN: no suspicious lesions or rashes  NEURO: Normal strength and tone, mentation intact and speech normal  PSYCH: mentation appears normal, affect normal/bright    Diagnostic Test Results:  Labs reviewed in Epic    ASSESSMENT/PLAN:   Diagnoses and all orders for this visit:    Routine history and physical examination of adult  -     Lipid panel reflex to direct LDL Fasting; Future  -     PSA, screen; Future  -     CBC with platelets and differential; Future  -     Hemoglobin A1c; Future  -     Basic metabolic panel  (Ca, Cl, CO2, Creat, Gluc, K, Na, BUN); Future  -     TSH with free T4 reflex; Future  -     Lipid panel reflex to direct LDL Fasting  -     PSA, screen  -     CBC with platelets and differential  -     Hemoglobin A1c  -     Basic metabolic panel  (Ca, Cl, CO2, Creat, Gluc, K, Na, BUN)  -     TSH with free T4 reflex    Essential hypertension  -     amLODIPine (NORVASC) 10 MG tablet; Take 1 tablet (10 mg) by mouth daily          Patient has been advised of split billing requirements and indicates understanding: Yes  COUNSELING:   Reviewed preventive health counseling, as reflected in patient instructions  Special attention given to:        Regular exercise       Healthy diet/nutrition    Estimated body mass index is 28.67 kg/m  as calculated from the following:    Height as of this encounter: 1.748 m (5' 8.8\").    Weight as of this encounter: 87.5 kg (193 lb).     Weight management plan: Discussed healthy diet and exercise guidelines    He reports that he has quit smoking. He has never used smokeless tobacco.      Counseling Resources:  ATP IV Guidelines  Pooled Cohorts Equation Calculator  FRAX Risk Assessment  ICSI Preventive Guidelines  Dietary Guidelines for Americans, 2010  USDA's MyPlate  ASA Prophylaxis  Lung CA Screening    Tara Mehta MD  [facility]  "

## 2022-07-13 NOTE — LETTER
July 22, 2022      Drew Bradford  [address] [facility] [address]        Dear ,    We are writing to inform you of your test results.    Your labs are OK except for mildly elevated hyperlipidemia, prediabetes, advise diet, exercise for weight loss. No change in meds. Recheck labs in 2023.       Resulted Orders   Lipid panel reflex to direct LDL Fasting   Result Value Ref Range    Cholesterol 197 <200 mg/dL    Triglycerides 119 <150 mg/dL    Direct Measure HDL 47 >=40 mg/dL    LDL Cholesterol Calculated 126 (H) <=100 mg/dL    Non HDL Cholesterol 150 (H) <130 mg/dL    Patient Fasting > 8hrs? Yes     Narrative    Cholesterol  Desirable:  <200 mg/dL    Triglycerides  Normal:  Less than 150 mg/dL  Borderline High:  150-199 mg/dL  High:  200-499 mg/dL  Very High:  Greater than or equal to 500 mg/dL    Direct Measure HDL  Female:  Greater than or equal to 50 mg/dL   Male:  Greater than or equal to 40 mg/dL    LDL Cholesterol  Desirable:  <100mg/dL  Above Desirable:  100-129 mg/dL   Borderline High:  130-159 mg/dL   High:  160-189 mg/dL   Very High:  >= 190 mg/dL    Non HDL Cholesterol  Desirable:  130 mg/dL  Above Desirable:  130-159 mg/dL  Borderline High:  160-189 mg/dL  High:  190-219 mg/dL  Very High:  Greater than or equal to 220 mg/dL   Hemoglobin A1c   Result Value Ref Range    Hemoglobin A1C 5.7 (H) 0.0 - 5.6 %      Comment:      Normal <5.7%   Prediabetes 5.7-6.4%    Diabetes 6.5% or higher     Note: Adopted from ADA consensus guidelines.   Basic metabolic panel  (Ca, Cl, CO2, Creat, Gluc, K, Na, BUN)   Result Value Ref Range    Sodium 138 133 - 144 mmol/L    Potassium 4.5 3.4 - 5.3 mmol/L    Chloride 107 94 - 109 mmol/L    Carbon Dioxide (CO2) 29 20 - 32 mmol/L    Anion Gap 2 (L) 3 - 14 mmol/L    Urea Nitrogen 15 7 - 30 mg/dL    Creatinine 0.90 0.66 - 1.25 mg/dL    Calcium 8.5 8.5 - 10.1 mg/dL    Glucose 99 70 - 99 mg/dL    GFR Estimate >90 >60 mL/min/1.73m2      Comment:      Effective December 21, 2021  eGFRcr in adults is calculated using the 2021 CKD-EPI creatinine equation which includes age and gender (Kaitlin et al., [facility], DOI: 10.1056/CQPChr2707061)   TSH with free T4 reflex   Result Value Ref Range    TSH 1.41 0.40 - 4.00 mU/L   CBC with platelets and differential   Result Value Ref Range    WBC Count 7.5 4.0 - 11.0 10e3/uL    RBC Count 5.29 4.40 - 5.90 10e6/uL    Hemoglobin 16.0 13.3 - 17.7 g/dL    Hematocrit 47.5 40.0 - 53.0 %    MCV 90 78 - 100 fL    MCH 30.2 26.5 - 33.0 pg    MCHC 33.7 31.5 - 36.5 g/dL    RDW 11.6 10.0 - 15.0 %    Platelet Count 163 150 - 450 10e3/uL    % Neutrophils 71 %    % Lymphocytes 18 %    % Monocytes 6 %    % Eosinophils 5 %    % Basophils 0 %    % Immature Granulocytes 0 %    Absolute Neutrophils 5.3 1.6 - 8.3 10e3/uL    Absolute Lymphocytes 1.4 0.8 - 5.3 10e3/uL    Absolute Monocytes 0.4 0.0 - 1.3 10e3/uL    Absolute Eosinophils 0.3 0.0 - 0.7 10e3/uL    Absolute Basophils 0.0 0.0 - 0.2 10e3/uL    Absolute Immature Granulocytes 0.0 <=0.4 10e3/uL   Prostate Specific Antigen Screen   Result Value Ref Range    Prostate Specific Antigen Screen 1.48 0.00 - 4.50 ng/mL    Narrative    This result is obtained using the Roche Elecsys total PSA method on the darryl e801 immunoassay analyzer. Results obtained with different assay methods or kits cannot be used interchangeably.       If you have any questions or concerns, please call the clinic at the number listed above.       Sincerely,      Tara Mehta MD

## 2022-07-14 LAB
ANION GAP SERPL CALCULATED.3IONS-SCNC: 2 MMOL/L (ref 3–14)
BUN SERPL-MCNC: 15 MG/DL (ref 7–30)
CALCIUM SERPL-MCNC: 8.5 MG/DL (ref 8.5–10.1)
CHLORIDE BLD-SCNC: 107 MMOL/L (ref 94–109)
CHOLEST SERPL-MCNC: 197 MG/DL
CO2 SERPL-SCNC: 29 MMOL/L (ref 20–32)
CREAT SERPL-MCNC: 0.9 MG/DL (ref 0.66–1.25)
FASTING STATUS PATIENT QL REPORTED: YES
GFR SERPL CREATININE-BSD FRML MDRD: >90 ML/MIN/1.73M2
GLUCOSE BLD-MCNC: 99 MG/DL (ref 70–99)
HDLC SERPL-MCNC: 47 MG/DL
LDLC SERPL CALC-MCNC: 126 MG/DL
NONHDLC SERPL-MCNC: 150 MG/DL
POTASSIUM BLD-SCNC: 4.5 MMOL/L (ref 3.4–5.3)
SODIUM SERPL-SCNC: 138 MMOL/L (ref 133–144)
TRIGL SERPL-MCNC: 119 MG/DL
TSH SERPL DL<=0.005 MIU/L-ACNC: 1.41 MU/L (ref 0.4–4)

## 2022-07-15 LAB — PSA SERPL-MCNC: 1.48 NG/ML (ref 0–4.5)

## 2022-07-31 ASSESSMENT — SLEEP AND FATIGUE QUESTIONNAIRES
HOW LIKELY ARE YOU TO NOD OFF OR FALL ASLEEP IN A CAR, WHILE STOPPED FOR A FEW MINUTES IN TRAFFIC: MODERATE CHANCE OF DOZING
HOW LIKELY ARE YOU TO NOD OFF OR FALL ASLEEP WHILE LYING DOWN TO REST IN THE AFTERNOON WHEN CIRCUMSTANCES PERMIT: HIGH CHANCE OF DOZING
HOW LIKELY ARE YOU TO NOD OFF OR FALL ASLEEP WHILE SITTING INACTIVE IN A PUBLIC PLACE: MODERATE CHANCE OF DOZING
HOW LIKELY ARE YOU TO NOD OFF OR FALL ASLEEP WHILE WATCHING TV: MODERATE CHANCE OF DOZING
HOW LIKELY ARE YOU TO NOD OFF OR FALL ASLEEP WHILE SITTING QUIETLY AFTER LUNCH WITHOUT ALCOHOL: HIGH CHANCE OF DOZING
HOW LIKELY ARE YOU TO NOD OFF OR FALL ASLEEP WHILE SITTING AND READING: MODERATE CHANCE OF DOZING
HOW LIKELY ARE YOU TO NOD OFF OR FALL ASLEEP WHILE SITTING AND TALKING TO SOMEONE: SLIGHT CHANCE OF DOZING
HOW LIKELY ARE YOU TO NOD OFF OR FALL ASLEEP WHEN YOU ARE A PASSENGER IN A CAR FOR AN HOUR WITHOUT A BREAK: HIGH CHANCE OF DOZING

## 2022-08-03 ENCOUNTER — VIRTUAL VISIT (OUTPATIENT)
Dept: SLEEP MEDICINE | Facility: CLINIC | Age: 62
End: 2022-08-03
Payer: COMMERCIAL

## 2022-08-03 DIAGNOSIS — G47.33 OSA (OBSTRUCTIVE SLEEP APNEA): Primary | ICD-10-CM

## 2022-08-03 DIAGNOSIS — G47.00 INSOMNIA, UNSPECIFIED TYPE: ICD-10-CM

## 2022-08-03 PROCEDURE — 99215 OFFICE O/P EST HI 40 MIN: CPT | Mod: 95 | Performed by: PHYSICIAN ASSISTANT

## 2022-08-03 NOTE — PATIENT INSTRUCTIONS
To request new CPAP supplies, call [facility] in [place]: [phone].    You can call our clinic at [phone] or

## 2022-08-03 NOTE — PROGRESS NOTES
CPAP Follow-Up Visit:    Date on this visit: 8/3/2022    Drew Bradford has a follow-up visit today to review his CPAP use for AWA. He was initially seen for nightly snoring, gasping and poor quality of sleep for more than 10 years. His medical history is significant for HTN. This appointment was conducted through an .     Previous Study Results:   Date: 1/19/19.  Weight 196 pounds.  AHI: 68/hr. RDI 70.4/hr. O2 angela 50%. He spent 38 minutes below 89%.  CPAP 9 cm was effective.     He denies concerns about his sleep or CPAP. He gets mask leak with the N20, but feels he gets a better fit with the nasal pillows.      PAP machine: Consulted Dreamstation 2 (obtained 2/27/2019). Pressure settings: 9-12 cm    The compliance data shows that the patient used the CPAP for 92% of nights for >4 hours.  The 90th% pressure is 11.7 cm.  The average time in large leak is 0.  The average nightly usage is 470 min.  The average AHI is 1.7/hr.       Interface:  Mask: Uses p10 preferentially. Had used N20 mask. Washes every 2-3 days.  Chin strap: No  Leak: Not with the nasal pillows mask  Using Humidifier: Yes, occasional. Has heat and humidifier turned off.   Condensation in hose or mask: No     Difficulties with therapy:    [-] Snoring with CPAP:  [-] Difficulty tolerating the pressure: feels a little low but does not want to change it right now  [-] Epistaxis/dry nose:   [-] Nasal congestion:  [+] Dry mouth: a little, sometimes drinks some water  [-] Mouth breathing:   [-] Pain/skin breakdown:      Improvements noted with CPAP: He sleeps much better with it  [+] waking up more refreshed  [+] sleeping better with less arousals  [+] improved energy level during the day    Weight change since sleep study: 199 lbs in June, stable in the last couple of years    Bedtime: 10-11 PM.  Wake time: 6-7 AM. Falls asleep in 5 minutes. Wakes 2 times per night for  minutes. At least twice per week, it takes over 30 minutes to fall  back to sleep. Reason for waking: uncertain.   Naps: he does not nap as he will have difficulty sleeping that night.  He feels sleepy in the afternoon for 10-20 minutes but then it goes away. He denies inadvertent dozing. He does not doze before bedtime.   He does sometimes have a racing mind at night if he can't fall asleep.     Past medical/surgical history, family history, social history, medications and allergies were reviewed.      Problem List:  Patient Active Problem List    Diagnosis Date Noted     Seasonal allergic rhinitis, unspecified trigger 06/10/2020     Priority: Medium     Essential hypertension 06/10/2020     Priority: Medium     AWA (obstructive sleep apnea) 01/21/2019     Priority: Medium     1/19/2019 [place] Split Sleep Study (196.0 lbs) - AHI 68.1, RDI 70.4, Supine AHI 68.1, REM AHI 40.0, Low O2% 49.8%, Time Spent ?88% 38.0, Time Spent ?89% 43.9. Treatment was titrated to a pressure of CPAP 9 with an AHI 0.8. Time spent in REM supine at this pressure was 57.0 minutes.       Hyperlipidemia LDL goal <100 11/15/2018     Priority: Medium        Impression/Plan:    (G47.33) AWA (obstructive sleep apnea)  (primary encounter diagnosis)  Comment: Mr. Bradford's main concern is to get a prescription for new supplies. He wants to make sure I order the nasal pillows mask. His download shows regular use and very well controlled apnea. He feels he sleeps much better with CPAP.  Plan: Comprehensive DME        Continue auto CPAP 9-12 cm. A prescription was written for new supplies. We reviewed recommendations for cleaning and replacing supplies.      (G47.00) Insomnia, unspecified type  Comment: He has a prolonged awakening in the middle of the night a couple of times per week. He is not sure why he wakes.  Plan: Reduce time in bed to 10:30 PM to 6 AM. Avoid sleep out side of that time. Consider getting out of bed if not sleeping after about 15-30 minutes, especially if you do not feel sleepy. Do something quiet  and relaxing until feeling ready to sleep and then go back to bed. Get up at the same time every day, even if you were awake for a prolonged period in the night. That way, you will be more sleepy the next night and it will help keep your sleep focused at night rather than make you more prone to sleep in the day.     He will follow up with me in about 1 year(s), sooner if not satisfied with his sleep.     51 minutes were spent on the date of the encounter doing chart review, history and exam, documentation and further activities as noted above.     Bennett Goltz, PA-C    CC: No ref. provider found

## 2022-08-03 NOTE — PROGRESS NOTES
Drew is a 62 year old who is being evaluated via a billable video visit.      How would you like to obtain your AVS? MyChart  If the video visit is dropped, the invitation should be resent by: Send to e-mail at: [email or web address]  Will anyone else be joining your video visit? No        Video-Visit Details    Video Start Time: 4:05 PM    Type of service:  Video Visit    Video End Time:4:50 PM    Originating Location (pt. Location): Home    Distant Location (provider location):  [facility] SLEEP [facility]     Platform used for Video Visit: iSentium

## 2022-08-04 NOTE — NURSING NOTE
Delayed Validus-IVCt message sent to remind patient to schedule an annual cpap appointment around 8/3/2023

## 2022-09-11 ENCOUNTER — HEALTH MAINTENANCE LETTER (OUTPATIENT)
Age: 62
End: 2022-09-11

## 2022-12-10 ENCOUNTER — OFFICE VISIT (OUTPATIENT)
Dept: URGENT CARE | Facility: URGENT CARE | Age: 62
End: 2022-12-10
Payer: COMMERCIAL

## 2022-12-10 VITALS
RESPIRATION RATE: 18 BRPM | DIASTOLIC BLOOD PRESSURE: 78 MMHG | TEMPERATURE: 96.9 F | BODY MASS INDEX: 28.67 KG/M2 | WEIGHT: 193 LBS | HEART RATE: 67 BPM | OXYGEN SATURATION: 97 % | SYSTOLIC BLOOD PRESSURE: 114 MMHG

## 2022-12-10 DIAGNOSIS — K05.00 GINGIVITIS, ACUTE, PLAQUE INDUCED: Primary | ICD-10-CM

## 2022-12-10 PROCEDURE — 99214 OFFICE O/P EST MOD 30 MIN: CPT | Performed by: NURSE PRACTITIONER

## 2022-12-10 RX ORDER — CLINDAMYCIN HCL 300 MG
300 CAPSULE ORAL 4 TIMES DAILY
Qty: 28 CAPSULE | Refills: 0 | Status: SHIPPED | OUTPATIENT
Start: 2022-12-10 | End: 2022-12-17

## 2022-12-10 RX ORDER — CHLORHEXIDINE GLUCONATE ORAL RINSE 1.2 MG/ML
15 SOLUTION DENTAL 2 TIMES DAILY
Qty: 300 ML | Refills: 0 | Status: SHIPPED | OUTPATIENT
Start: 2022-12-10 | End: 2022-12-20

## 2022-12-10 NOTE — PATIENT INSTRUCTIONS
Call and make appointment with dentist to follow up.      Take all medication until gone    Mouth rinse twice a day.

## 2022-12-10 NOTE — PROGRESS NOTES
Chief Complaint   Patient presents with     Oral Swelling     Gum swelling has a gum infection since last ten days. Metronidazole was prescribed by primary care doctor for this infection in the past.    [employer]  used for entire visit.      ICD-10-CM    1. Gingivitis, acute, plaque induced  K05.00 chlorhexidine (PERIDEX) 0.12 % solution     clindamycin (CLEOCIN) 300 MG capsule      Patient will take clindamycin and use chlorhexidine mouth solution.  He is instructed to follow-up with dentist once the antibiotics are completed.  If symptoms fail to improve or worsen he should be seen again.    31 minutes spent on the date of the encounter doing chart review, history and exam, documentation and further activities per the note    Subjective     Drew Bradford is an 62 year old male who presents to clinic today for swollen gums with blisters for one week. He reports he has had this infection in the past and  It was treated with Metronidazole per his family doctor. He took 2 doses he had left over when this started.      ROS: 10 point ROS neg other than the symptoms noted above in the HPI.       Objective    /78 (BP Location: Left arm, Patient Position: Sitting, Cuff Size: Adult Regular)   Pulse 67   Temp 96.9  F (36.1  C) (Tympanic)   Resp 18   Wt 87.5 kg (193 lb)   SpO2 97%   BMI 28.67 kg/m    Nurses notes and VS have been reviewed.    Physical Exam   GENERAL: Alert, vigorous, is in no acute distress.  SKIN: skin is clear, no rash or abnormal pigmentation  HEAD: The head is normocephalic.   EYES: The eyes are normal. The conjunctivae and cornea normal. Red reflexes are seen bilaterally.  NOSE: Clear, no discharge or congestion: pharynx noninjected  NECK: The neck is supple and thyroid is normal, no masses; LYMPH NODES: No adenopathy  HENT: Pharynx appears normal, nasal passages are normal and there is no discharge.  Front lower gums are red, swollen and there is a 1 cm elevation of tissue just under  the front bottom teeth, no flocculence      Patient Instructions   Call and make appointment with dentist to follow up.      Take all medication until gone    Mouth rinse twice a day.      DIAMANTE Ng, CNP  [facility] Provider    The use of Dragon/[employer] dictation services may have been used to construct the content in this note; any grammatical or spelling errors are non-intentional. Please contact the author of this note directly if you are in need of any clarification.

## 2023-08-18 ENCOUNTER — OFFICE VISIT (OUTPATIENT)
Dept: FAMILY MEDICINE | Facility: CLINIC | Age: 63
End: 2023-08-18
Payer: COMMERCIAL

## 2023-08-18 VITALS
HEIGHT: 69 IN | RESPIRATION RATE: 16 BRPM | HEART RATE: 53 BPM | WEIGHT: 203 LBS | TEMPERATURE: 97.7 F | DIASTOLIC BLOOD PRESSURE: 84 MMHG | BODY MASS INDEX: 30.07 KG/M2 | OXYGEN SATURATION: 100 % | SYSTOLIC BLOOD PRESSURE: 152 MMHG

## 2023-08-18 DIAGNOSIS — Z23 NEED FOR DIPHTHERIA-TETANUS-PERTUSSIS (TDAP) VACCINE: ICD-10-CM

## 2023-08-18 DIAGNOSIS — Z13.220 SCREENING FOR HYPERLIPIDEMIA: ICD-10-CM

## 2023-08-18 DIAGNOSIS — Z00.00 ROUTINE HISTORY AND PHYSICAL EXAMINATION OF ADULT: Primary | ICD-10-CM

## 2023-08-18 DIAGNOSIS — I10 ESSENTIAL HYPERTENSION: ICD-10-CM

## 2023-08-18 LAB
ANION GAP SERPL CALCULATED.3IONS-SCNC: 11 MMOL/L (ref 7–15)
BASOPHILS # BLD AUTO: 0 10E3/UL (ref 0–0.2)
BASOPHILS NFR BLD AUTO: 1 %
BUN SERPL-MCNC: 11.1 MG/DL (ref 8–23)
CALCIUM SERPL-MCNC: 9 MG/DL (ref 8.8–10.2)
CHLORIDE SERPL-SCNC: 106 MMOL/L (ref 98–107)
CHOLEST SERPL-MCNC: 195 MG/DL
CREAT SERPL-MCNC: 0.94 MG/DL (ref 0.67–1.17)
DEPRECATED HCO3 PLAS-SCNC: 26 MMOL/L (ref 22–29)
EOSINOPHIL # BLD AUTO: 0.4 10E3/UL (ref 0–0.7)
EOSINOPHIL NFR BLD AUTO: 5 %
ERYTHROCYTE [DISTWIDTH] IN BLOOD BY AUTOMATED COUNT: 12 % (ref 10–15)
GFR SERPL CREATININE-BSD FRML MDRD: >90 ML/MIN/1.73M2
GLUCOSE SERPL-MCNC: 92 MG/DL (ref 70–99)
HBA1C MFR BLD: 5.5 % (ref 0–5.6)
HCT VFR BLD AUTO: 46.2 % (ref 40–53)
HDLC SERPL-MCNC: 42 MG/DL
HGB BLD-MCNC: 15.5 G/DL (ref 13.3–17.7)
IMM GRANULOCYTES # BLD: 0 10E3/UL
IMM GRANULOCYTES NFR BLD: 0 %
LDLC SERPL CALC-MCNC: 124 MG/DL
LYMPHOCYTES # BLD AUTO: 1.8 10E3/UL (ref 0.8–5.3)
LYMPHOCYTES NFR BLD AUTO: 22 %
MCH RBC QN AUTO: 30 PG (ref 26.5–33)
MCHC RBC AUTO-ENTMCNC: 33.5 G/DL (ref 31.5–36.5)
MCV RBC AUTO: 89 FL (ref 78–100)
MONOCYTES # BLD AUTO: 0.6 10E3/UL (ref 0–1.3)
MONOCYTES NFR BLD AUTO: 7 %
NEUTROPHILS # BLD AUTO: 5.2 10E3/UL (ref 1.6–8.3)
NEUTROPHILS NFR BLD AUTO: 65 %
NONHDLC SERPL-MCNC: 153 MG/DL
PLATELET # BLD AUTO: 185 10E3/UL (ref 150–450)
POTASSIUM SERPL-SCNC: 3.9 MMOL/L (ref 3.4–5.3)
PSA SERPL DL<=0.01 NG/ML-MCNC: 1.24 NG/ML (ref 0–4.5)
RBC # BLD AUTO: 5.17 10E6/UL (ref 4.4–5.9)
SODIUM SERPL-SCNC: 143 MMOL/L (ref 136–145)
TRIGL SERPL-MCNC: 145 MG/DL
TSH SERPL DL<=0.005 MIU/L-ACNC: 2.24 UIU/ML (ref 0.3–4.2)
WBC # BLD AUTO: 7.9 10E3/UL (ref 4–11)

## 2023-08-18 PROCEDURE — G0103 PSA SCREENING: HCPCS | Performed by: INTERNAL MEDICINE

## 2023-08-18 PROCEDURE — 90471 IMMUNIZATION ADMIN: CPT | Performed by: INTERNAL MEDICINE

## 2023-08-18 PROCEDURE — 90715 TDAP VACCINE 7 YRS/> IM: CPT | Performed by: INTERNAL MEDICINE

## 2023-08-18 PROCEDURE — 99396 PREV VISIT EST AGE 40-64: CPT | Mod: 25 | Performed by: INTERNAL MEDICINE

## 2023-08-18 PROCEDURE — 84443 ASSAY THYROID STIM HORMONE: CPT | Performed by: INTERNAL MEDICINE

## 2023-08-18 PROCEDURE — 83036 HEMOGLOBIN GLYCOSYLATED A1C: CPT | Performed by: INTERNAL MEDICINE

## 2023-08-18 PROCEDURE — 80048 BASIC METABOLIC PNL TOTAL CA: CPT | Performed by: INTERNAL MEDICINE

## 2023-08-18 PROCEDURE — 85025 COMPLETE CBC W/AUTO DIFF WBC: CPT | Performed by: INTERNAL MEDICINE

## 2023-08-18 PROCEDURE — 36415 COLL VENOUS BLD VENIPUNCTURE: CPT | Performed by: INTERNAL MEDICINE

## 2023-08-18 PROCEDURE — 80061 LIPID PANEL: CPT | Performed by: INTERNAL MEDICINE

## 2023-08-18 ASSESSMENT — PAIN SCALES - GENERAL: PAINLEVEL: NO PAIN (0)

## 2023-08-18 NOTE — PROGRESS NOTES
SUBJECTIVE:   CC: Drew is an 63 year old who presents for preventative health visit.       Healthy Habits:     Getting at least 3 servings of Calcium per day:  Yes    Bi-annual eye exam:  Yes    Dental care twice a year:  Yes    Sleep apnea or symptoms of sleep apnea:  None    Diet:  Regular (no restrictions)    Frequency of exercise:  4-5 days/week    Duration of exercise:  30-45 minutes    Taking medications regularly:  Yes    Barriers to taking medications:  None    Medication side effects:  None    Additional concerns today:  No          Social History     Tobacco Use    Smoking status: Former    Smokeless tobacco: Never   Substance Use Topics    Alcohol use: Yes     Alcohol/week: 2.0 standard drinks of alcohol     Types: 2 Cans of beer per week     Comment: 2 cans of beer a week         Last PSA:   PSA   Date Value Ref Range Status   06/23/2021 1.49 0 - 4 ug/L Final     Comment:     Assay Method:  Chemiluminescence using Siemens Vista analyzer     Prostate Specific Antigen Screen   Date Value Ref Range Status   07/13/2022 1.48 0.00 - 4.50 ng/mL Final       Reviewed orders with patient. Reviewed health maintenance and updated orders accordingly - Yes  Labs reviewed in EPIC    Reviewed and updated as needed this visit by clinical staff                  Reviewed and updated as needed this visit by Provider                 Past Medical History:   Diagnosis Date    HTN (hypertension)     Hyperlipidemia LDL goal <100     Nocturia     Presbyopia     Seasonal allergies         Review of Systems  CONSTITUTIONAL: NEGATIVE for fever, chills, change in weight  INTEGUMENTARY/SKIN: NEGATIVE for worrisome rashes, moles or lesions  EYES: NEGATIVE for vision changes or irritation  ENT: NEGATIVE for ear, mouth and throat problems  RESP: NEGATIVE for significant cough or SOB  CV: NEGATIVE for chest pain, palpitations or peripheral edema  GI: NEGATIVE for nausea, abdominal pain, heartburn, or change in bowel habits   male:  "negative for dysuria, hematuria, decreased urinary stream, erectile dysfunction, urethral discharge  MUSCULOSKELETAL: NEGATIVE for significant arthralgias or myalgia  NEURO: NEGATIVE for weakness, dizziness or paresthesias  PSYCHIATRIC: NEGATIVE for changes in mood or affect    OBJECTIVE:   BP (!) 152/84   Pulse 53   Temp 97.7  F (36.5  C) (Temporal)   Resp 16   Ht 1.748 m (5' 8.8\")   Wt 92.1 kg (203 lb)   SpO2 100%   BMI 30.15 kg/m      Physical Exam  GENERAL: alert and no distress  EYES: Eyes grossly normal to inspection, conjunctivae and sclerae normal  HENT: ear canals and TM's normal, nose and mouth without ulcers or lesions  NECK: no asymmetry  RESP: lungs clear to auscultation  CV: regular rate and rhythm  ABDOMEN: soft, nontender, bowel sounds normal  MS: no gross musculoskeletal defects noted, no edema  SKIN: no suspicious lesions or rashes  NEURO: Normal strength and tone, mentation intact and speech normal  PSYCH: mentation appears normal, affect normal/bright    Diagnostic Test Results:  Labs reviewed in Epic    ASSESSMENT/PLAN:   Drew was seen today for physical.    Diagnoses and all orders for this visit:    Routine history and physical examination of adult  -     REVIEW OF HEALTH MAINTENANCE PROTOCOL ORDERS  -     PSA, screen; Future  -     TSH with free T4 reflex; Future  -     Basic metabolic panel  (Ca, Cl, CO2, Creat, Gluc, K, Na, BUN); Future  -     Hemoglobin A1c; Future  -     CBC with platelets and differential; Future  -     PSA, screen  -     TSH with free T4 reflex  -     Basic metabolic panel  (Ca, Cl, CO2, Creat, Gluc, K, Na, BUN)  -     Hemoglobin A1c  -     CBC with platelets and differential    Essential hypertension  - diet, exercise      Screening for hyperlipidemia  -     Lipid panel reflex to direct LDL Fasting; Future  -     Lipid panel reflex to direct LDL Fasting    Need for diphtheria-tetanus-pertussis (Tdap) vaccine  -     TDAP 10-64Y (ADACEL,BOOSTRIX)        Patient " has been advised of split billing requirements and indicates understanding: Yes      COUNSELING:   Reviewed preventive health counseling, as reflected in patient instructions  Special attention given to:        Regular exercise       Healthy diet/nutrition        He reports that he has quit smoking. He has never used smokeless tobacco.            Tara Mehta MD  [facility]

## 2023-08-24 DIAGNOSIS — I10 ESSENTIAL HYPERTENSION: ICD-10-CM

## 2023-08-25 RX ORDER — AMLODIPINE BESYLATE 10 MG/1
10 TABLET ORAL DAILY
Qty: 90 TABLET | Refills: 0 | Status: SHIPPED | OUTPATIENT
Start: 2023-08-25 | End: 2023-10-23

## 2023-08-27 ENCOUNTER — MYC MEDICAL ADVICE (OUTPATIENT)
Dept: FAMILY MEDICINE | Facility: CLINIC | Age: 63
End: 2023-08-27
Payer: COMMERCIAL

## 2023-10-23 ENCOUNTER — OFFICE VISIT (OUTPATIENT)
Dept: SLEEP MEDICINE | Facility: CLINIC | Age: 63
End: 2023-10-23
Payer: COMMERCIAL

## 2023-10-23 VITALS
HEART RATE: 102 BPM | SYSTOLIC BLOOD PRESSURE: 132 MMHG | BODY MASS INDEX: 29.06 KG/M2 | DIASTOLIC BLOOD PRESSURE: 83 MMHG | WEIGHT: 196.2 LBS | HEIGHT: 69 IN | OXYGEN SATURATION: 100 %

## 2023-10-23 DIAGNOSIS — G47.33 OSA (OBSTRUCTIVE SLEEP APNEA): Primary | ICD-10-CM

## 2023-10-23 PROCEDURE — 99214 OFFICE O/P EST MOD 30 MIN: CPT | Performed by: PSYCHIATRY & NEUROLOGY

## 2023-10-23 NOTE — PROGRESS NOTES
Patient returns to discuss CPAP usage.     Patient was diagnosed with sleep disordered breathing in particular with an obstructive component in 2019 with an AHI of 68.  The patient has been treated with autotitrating CPAP (pressures 9 to 12).  The patient indicates treatment is going well.  Uses the device nearly every night for the vast majority of nights.  Indicates that they feel more awake and alert when they use it.      Per the download patient is using the device > 4 hours 92% of nights.  AHI on download is now <5.  Leak is reasonable.     Patient is highly motivated to continue to use therapy     Patient has a history of currently treated HTN.     Orders placed for new supplies and patient will follow up in one year or earlier PRN.  He has several questions about the machine.  Will ask that he visit with [facility] with .    All questions have been answered.  Its a pleasure being asked to participate in the patients care.  Patient has been advised on the importance of never driving if tired or sleepy.     In addition to face to face time, time was spent in care coordination today (chart review, orders, documentation).  Total time spent in the care of the patient today was 30 minutes.

## 2023-10-23 NOTE — NURSING NOTE
"Chief Complaint   Patient presents with    CPAP Follow Up     CPAP follow up.       Initial /83   Pulse 102   Ht 1.748 m (5' 8.8\")   Wt 89 kg (196 lb 3.2 oz)   SpO2 100%   BMI 29.14 kg/m   Estimated body mass index is 29.14 kg/m  as calculated from the following:    Height as of this encounter: 1.748 m (5' 8.8\").    Weight as of this encounter: 89 kg (196 lb 3.2 oz).    Medication Reconciliation: complete  ESS 9  Adolph Guerrero MA         "

## 2023-11-12 ENCOUNTER — MYC MEDICAL ADVICE (OUTPATIENT)
Dept: FAMILY MEDICINE | Facility: CLINIC | Age: 63
End: 2023-11-12
Payer: COMMERCIAL

## 2023-11-12 DIAGNOSIS — I10 ESSENTIAL HYPERTENSION: ICD-10-CM

## 2023-11-13 RX ORDER — AMLODIPINE BESYLATE 10 MG/1
10 TABLET ORAL DAILY
Qty: 90 TABLET | Refills: 1 | Status: ON HOLD | OUTPATIENT
Start: 2023-11-13 | End: 2023-12-22

## 2023-12-13 ENCOUNTER — OFFICE VISIT (OUTPATIENT)
Dept: URGENT CARE | Facility: URGENT CARE | Age: 63
End: 2023-12-13
Payer: COMMERCIAL

## 2023-12-13 VITALS
OXYGEN SATURATION: 99 % | RESPIRATION RATE: 16 BRPM | BODY MASS INDEX: 28.37 KG/M2 | HEART RATE: 63 BPM | WEIGHT: 191 LBS | DIASTOLIC BLOOD PRESSURE: 88 MMHG | SYSTOLIC BLOOD PRESSURE: 142 MMHG | TEMPERATURE: 98 F

## 2023-12-13 DIAGNOSIS — R07.89 LEFT CHEST PRESSURE: Primary | ICD-10-CM

## 2023-12-13 PROCEDURE — 99215 OFFICE O/P EST HI 40 MIN: CPT | Mod: 25 | Performed by: PHYSICIAN ASSISTANT

## 2023-12-13 PROCEDURE — 93000 ELECTROCARDIOGRAM COMPLETE: CPT | Performed by: PHYSICIAN ASSISTANT

## 2023-12-13 NOTE — PROGRESS NOTES
SUBJECTIVE:  Drew Bradford is a 63 year old male who presents to the office with the CC of chest pain.  Patient complains of chest pressure/discomfort lasting about 2 minutes.  No pain currently present.  Tender and short of breath.   Occurred yesterday while mopping the floor. Doubled over with squeeze of left chest at heart  Has had general chest pain for 1 month.      Past Medical History:   Diagnosis Date    HTN (hypertension)     Hyperlipidemia LDL goal <100     Nocturia     Presbyopia     Seasonal allergies          Current Outpatient Medications:     amLODIPine (NORVASC) 10 MG tablet, Take 1 tablet (10 mg) by mouth daily Take 10 mg by mouth daily, Disp: 90 tablet, Rfl: 1    Calcium Carb-Cholecalciferol (CALCIUM + D3) 600-200 MG-UNIT TABS, Take by mouth daily , Disp: , Rfl:     CALCIUM PO, Take 1 tablet by mouth daily, Disp: , Rfl:     Ginkgo Biloba Complex 440 MG CAPS, Take 440 mg by mouth daily , Disp: , Rfl:     GINKGO BILOBA PO, Take 1 tablet by mouth daily, Disp: , Rfl:     tamsulosin (FLOMAX) 0.4 MG capsule, Take 1 capsule (0.4 mg) by mouth daily, Disp: 90 capsule, Rfl: 3    Social History     Tobacco Use    Smoking status: Former    Smokeless tobacco: Never    Tobacco comments:     Quit 20 years ago   Substance Use Topics    Alcohol use: Yes     Alcohol/week: 2.0 standard drinks of alcohol     Types: 2 Cans of beer per week     Comment: 2 cans of beer a week       ROS:Review of systems negative except as stated above.    EXAM:  BP (!) 142/88 (BP Location: Left arm, Patient Position: Sitting, Cuff Size: Adult Large)   Pulse 63   Temp 98  F (36.7  C) (Tympanic)   Resp 16   Wt 86.6 kg (191 lb)   SpO2 99%   BMI 28.37 kg/m    GENERAL APPEARANCE: healthy, alert and no distress  RESP: lungs clear to auscultation - no rales, rhonchi or wheezes  CV: regular rates and rhythm, normal S1 S2, no murmur noted  NEURO: Normal strength and tone, sensory exam grossly normal,  normal speech and mentation  SKIN: no  suspicious lesions or rashes     EKG: Bradycardia    Assessment  / IMPRESSION  (R07.89) Left chest pressure  (primary encounter diagnosis)  Comment: cannot rule out cardiac etiology  Plan: EKG 12-lead complete w/read - Clinics        To ED with wife

## 2023-12-15 ENCOUNTER — APPOINTMENT (OUTPATIENT)
Dept: GENERAL RADIOLOGY | Facility: CLINIC | Age: 63
End: 2023-12-15
Attending: EMERGENCY MEDICINE
Payer: COMMERCIAL

## 2023-12-15 ENCOUNTER — OFFICE VISIT (OUTPATIENT)
Dept: URGENT CARE | Facility: URGENT CARE | Age: 63
End: 2023-12-15
Payer: COMMERCIAL

## 2023-12-15 ENCOUNTER — HOSPITAL ENCOUNTER (INPATIENT)
Facility: CLINIC | Age: 63
LOS: 7 days | Discharge: HOME | End: 2023-12-22
Attending: EMERGENCY MEDICINE | Admitting: INTERNAL MEDICINE
Payer: COMMERCIAL

## 2023-12-15 VITALS
SYSTOLIC BLOOD PRESSURE: 125 MMHG | DIASTOLIC BLOOD PRESSURE: 80 MMHG | TEMPERATURE: 97.6 F | OXYGEN SATURATION: 96 % | HEART RATE: 68 BPM

## 2023-12-15 DIAGNOSIS — Z95.1 S/P CABG (CORONARY ARTERY BYPASS GRAFT): Primary | ICD-10-CM

## 2023-12-15 DIAGNOSIS — I21.4 NSTEMI (NON-ST ELEVATED MYOCARDIAL INFARCTION) (H): ICD-10-CM

## 2023-12-15 DIAGNOSIS — R07.9 CHEST PAIN, UNSPECIFIED TYPE: Primary | ICD-10-CM

## 2023-12-15 LAB
ANION GAP SERPL CALCULATED.3IONS-SCNC: 9 MMOL/L (ref 7–15)
ATRIAL RATE - MUSE: 61 BPM
BASOPHILS # BLD AUTO: 0.1 10E3/UL (ref 0–0.2)
BASOPHILS NFR BLD AUTO: 1 %
BUN SERPL-MCNC: 13 MG/DL (ref 8–23)
CALCIUM SERPL-MCNC: 8.9 MG/DL (ref 8.8–10.2)
CHLORIDE SERPL-SCNC: 103 MMOL/L (ref 98–107)
CREAT SERPL-MCNC: 0.91 MG/DL (ref 0.67–1.17)
D DIMER PPP FEU-MCNC: <0.27 UG/ML FEU (ref 0–0.5)
DEPRECATED HCO3 PLAS-SCNC: 27 MMOL/L (ref 22–29)
DIASTOLIC BLOOD PRESSURE - MUSE: NORMAL MMHG
EGFRCR SERPLBLD CKD-EPI 2021: >90 ML/MIN/1.73M2
EOSINOPHIL # BLD AUTO: 0.3 10E3/UL (ref 0–0.7)
EOSINOPHIL NFR BLD AUTO: 3 %
ERYTHROCYTE [DISTWIDTH] IN BLOOD BY AUTOMATED COUNT: 11.7 % (ref 10–15)
GLUCOSE SERPL-MCNC: 105 MG/DL (ref 70–99)
HCT VFR BLD AUTO: 44.7 % (ref 40–53)
HGB BLD-MCNC: 15 G/DL (ref 13.3–17.7)
IMM GRANULOCYTES # BLD: 0 10E3/UL
IMM GRANULOCYTES NFR BLD: 0 %
INTERPRETATION ECG - MUSE: NORMAL
LYMPHOCYTES # BLD AUTO: 1.6 10E3/UL (ref 0.8–5.3)
LYMPHOCYTES NFR BLD AUTO: 19 %
MCH RBC QN AUTO: 30.1 PG (ref 26.5–33)
MCHC RBC AUTO-ENTMCNC: 33.6 G/DL (ref 31.5–36.5)
MCV RBC AUTO: 90 FL (ref 78–100)
MONOCYTES # BLD AUTO: 0.6 10E3/UL (ref 0–1.3)
MONOCYTES NFR BLD AUTO: 7 %
NEUTROPHILS # BLD AUTO: 5.9 10E3/UL (ref 1.6–8.3)
NEUTROPHILS NFR BLD AUTO: 70 %
NRBC # BLD AUTO: 0 10E3/UL
NRBC BLD AUTO-RTO: 0 /100
P AXIS - MUSE: 59 DEGREES
PLATELET # BLD AUTO: 170 10E3/UL (ref 150–450)
POTASSIUM SERPL-SCNC: 4.6 MMOL/L (ref 3.4–5.3)
PR INTERVAL - MUSE: 194 MS
QRS DURATION - MUSE: 100 MS
QT - MUSE: 374 MS
QTC - MUSE: 376 MS
R AXIS - MUSE: -11 DEGREES
RBC # BLD AUTO: 4.98 10E6/UL (ref 4.4–5.9)
SODIUM SERPL-SCNC: 139 MMOL/L (ref 135–145)
SYSTOLIC BLOOD PRESSURE - MUSE: NORMAL MMHG
T AXIS - MUSE: 64 DEGREES
TROPONIN T SERPL HS-MCNC: 273 NG/L
TROPONIN T SERPL HS-MCNC: 289 NG/L
VENTRICULAR RATE- MUSE: 61 BPM
WBC # BLD AUTO: 8.5 10E3/UL (ref 4–11)

## 2023-12-15 PROCEDURE — 96374 THER/PROPH/DIAG INJ IV PUSH: CPT

## 2023-12-15 PROCEDURE — 85379 FIBRIN DEGRADATION QUANT: CPT | Performed by: EMERGENCY MEDICINE

## 2023-12-15 PROCEDURE — 99222 1ST HOSP IP/OBS MODERATE 55: CPT | Mod: AI | Performed by: PHYSICIAN ASSISTANT

## 2023-12-15 PROCEDURE — 84484 ASSAY OF TROPONIN QUANT: CPT | Mod: 91 | Performed by: EMERGENCY MEDICINE

## 2023-12-15 PROCEDURE — 80048 BASIC METABOLIC PNL TOTAL CA: CPT | Performed by: EMERGENCY MEDICINE

## 2023-12-15 PROCEDURE — 250N000013 HC RX MED GY IP 250 OP 250 PS 637: Performed by: EMERGENCY MEDICINE

## 2023-12-15 PROCEDURE — 250N000011 HC RX IP 250 OP 636: Mod: JZ | Performed by: EMERGENCY MEDICINE

## 2023-12-15 PROCEDURE — 250N000013 HC RX MED GY IP 250 OP 250 PS 637: Performed by: PHYSICIAN ASSISTANT

## 2023-12-15 PROCEDURE — 93005 ELECTROCARDIOGRAM TRACING: CPT

## 2023-12-15 PROCEDURE — 85025 COMPLETE CBC W/AUTO DIFF WBC: CPT | Performed by: EMERGENCY MEDICINE

## 2023-12-15 PROCEDURE — 99285 EMERGENCY DEPT VISIT HI MDM: CPT | Mod: 25

## 2023-12-15 PROCEDURE — 99207 PR INPT ADMISSION FROM CLINIC: CPT

## 2023-12-15 PROCEDURE — 36415 COLL VENOUS BLD VENIPUNCTURE: CPT | Performed by: EMERGENCY MEDICINE

## 2023-12-15 PROCEDURE — 93000 ELECTROCARDIOGRAM COMPLETE: CPT

## 2023-12-15 PROCEDURE — 210N000002 HC R&B HEART CARE

## 2023-12-15 PROCEDURE — 71046 X-RAY EXAM CHEST 2 VIEWS: CPT

## 2023-12-15 RX ORDER — NALOXONE HYDROCHLORIDE 0.4 MG/ML
0.4 INJECTION, SOLUTION INTRAMUSCULAR; INTRAVENOUS; SUBCUTANEOUS
Status: DISCONTINUED | OUTPATIENT
Start: 2023-12-15 | End: 2023-12-22 | Stop reason: HOSPADM

## 2023-12-15 RX ORDER — HEPARIN SODIUM 10000 [USP'U]/100ML
0-5000 INJECTION, SOLUTION INTRAVENOUS CONTINUOUS
Status: DISCONTINUED | OUTPATIENT
Start: 2023-12-15 | End: 2023-12-19

## 2023-12-15 RX ORDER — ONDANSETRON 2 MG/ML
4 INJECTION INTRAMUSCULAR; INTRAVENOUS EVERY 6 HOURS PRN
Status: DISCONTINUED | OUTPATIENT
Start: 2023-12-15 | End: 2023-12-19

## 2023-12-15 RX ORDER — AMLODIPINE BESYLATE 10 MG/1
10 TABLET ORAL DAILY
Status: DISCONTINUED | OUTPATIENT
Start: 2023-12-16 | End: 2023-12-17

## 2023-12-15 RX ORDER — ASPIRIN 81 MG/1
324 TABLET, CHEWABLE ORAL ONCE
Status: COMPLETED | OUTPATIENT
Start: 2023-12-15 | End: 2023-12-15

## 2023-12-15 RX ORDER — ONDANSETRON 4 MG/1
4 TABLET, ORALLY DISINTEGRATING ORAL EVERY 6 HOURS PRN
Status: DISCONTINUED | OUTPATIENT
Start: 2023-12-15 | End: 2023-12-19

## 2023-12-15 RX ORDER — ATORVASTATIN CALCIUM 40 MG/1
40 TABLET, FILM COATED ORAL EVERY EVENING
Status: DISCONTINUED | OUTPATIENT
Start: 2023-12-15 | End: 2023-12-22 | Stop reason: HOSPADM

## 2023-12-15 RX ORDER — OXYCODONE HYDROCHLORIDE 5 MG/1
5 TABLET ORAL EVERY 4 HOURS PRN
Status: DISCONTINUED | OUTPATIENT
Start: 2023-12-15 | End: 2023-12-16

## 2023-12-15 RX ORDER — NALOXONE HYDROCHLORIDE 0.4 MG/ML
0.2 INJECTION, SOLUTION INTRAMUSCULAR; INTRAVENOUS; SUBCUTANEOUS
Status: DISCONTINUED | OUTPATIENT
Start: 2023-12-15 | End: 2023-12-22 | Stop reason: HOSPADM

## 2023-12-15 RX ORDER — PROCHLORPERAZINE MALEATE 10 MG
10 TABLET ORAL EVERY 6 HOURS PRN
Status: DISCONTINUED | OUTPATIENT
Start: 2023-12-15 | End: 2023-12-19

## 2023-12-15 RX ORDER — AMOXICILLIN 250 MG
1 CAPSULE ORAL 2 TIMES DAILY PRN
Status: DISCONTINUED | OUTPATIENT
Start: 2023-12-15 | End: 2023-12-19

## 2023-12-15 RX ORDER — HYDRALAZINE HYDROCHLORIDE 20 MG/ML
10 INJECTION INTRAMUSCULAR; INTRAVENOUS EVERY 4 HOURS PRN
Status: DISCONTINUED | OUTPATIENT
Start: 2023-12-15 | End: 2023-12-19

## 2023-12-15 RX ORDER — AMOXICILLIN 250 MG
2 CAPSULE ORAL 2 TIMES DAILY PRN
Status: DISCONTINUED | OUTPATIENT
Start: 2023-12-15 | End: 2023-12-19

## 2023-12-15 RX ORDER — POLYETHYLENE GLYCOL 3350 17 G/17G
17 POWDER, FOR SOLUTION ORAL 2 TIMES DAILY PRN
Status: DISCONTINUED | OUTPATIENT
Start: 2023-12-15 | End: 2023-12-19

## 2023-12-15 RX ORDER — PROCHLORPERAZINE 25 MG
25 SUPPOSITORY, RECTAL RECTAL EVERY 12 HOURS PRN
Status: DISCONTINUED | OUTPATIENT
Start: 2023-12-15 | End: 2023-12-19

## 2023-12-15 RX ORDER — CALCIUM CARBONATE 500 MG/1
1000 TABLET, CHEWABLE ORAL 4 TIMES DAILY PRN
Status: DISCONTINUED | OUTPATIENT
Start: 2023-12-15 | End: 2023-12-22 | Stop reason: HOSPADM

## 2023-12-15 RX ORDER — HEPARIN SODIUM 10000 [USP'U]/100ML
0-5000 INJECTION, SOLUTION INTRAVENOUS CONTINUOUS
Status: DISCONTINUED | OUTPATIENT
Start: 2023-12-15 | End: 2023-12-15

## 2023-12-15 RX ORDER — AMOXICILLIN 250 MG
1 CAPSULE ORAL 2 TIMES DAILY
Status: DISCONTINUED | OUTPATIENT
Start: 2023-12-15 | End: 2023-12-19

## 2023-12-15 RX ORDER — ACETAMINOPHEN 650 MG/1
650 SUPPOSITORY RECTAL EVERY 4 HOURS PRN
Status: DISCONTINUED | OUTPATIENT
Start: 2023-12-15 | End: 2023-12-22 | Stop reason: HOSPADM

## 2023-12-15 RX ORDER — LIDOCAINE 40 MG/G
CREAM TOPICAL
Status: DISCONTINUED | OUTPATIENT
Start: 2023-12-15 | End: 2023-12-16

## 2023-12-15 RX ORDER — ACETAMINOPHEN 325 MG/1
650 TABLET ORAL EVERY 4 HOURS PRN
Status: DISCONTINUED | OUTPATIENT
Start: 2023-12-15 | End: 2023-12-22 | Stop reason: HOSPADM

## 2023-12-15 RX ORDER — AMOXICILLIN 250 MG
2 CAPSULE ORAL 2 TIMES DAILY
Status: DISCONTINUED | OUTPATIENT
Start: 2023-12-15 | End: 2023-12-19

## 2023-12-15 RX ORDER — HYDRALAZINE HYDROCHLORIDE 10 MG/1
10 TABLET, FILM COATED ORAL EVERY 4 HOURS PRN
Status: DISCONTINUED | OUTPATIENT
Start: 2023-12-15 | End: 2023-12-19

## 2023-12-15 RX ADMIN — ASPIRIN 81 MG CHEWABLE TABLET 324 MG: 81 TABLET CHEWABLE at 20:08

## 2023-12-15 RX ADMIN — METOPROLOL TARTRATE 12.5 MG: 25 TABLET, FILM COATED ORAL at 22:27

## 2023-12-15 RX ADMIN — HEPARIN SODIUM 1050 UNITS/HR: 10000 INJECTION, SOLUTION INTRAVENOUS at 20:09

## 2023-12-15 RX ADMIN — DOCUSATE SODIUM 50 MG AND SENNOSIDES 8.6 MG 1 TABLET: 8.6; 5 TABLET, FILM COATED ORAL at 22:28

## 2023-12-15 RX ADMIN — ATORVASTATIN CALCIUM 40 MG: 40 TABLET, FILM COATED ORAL at 22:27

## 2023-12-15 ASSESSMENT — ACTIVITIES OF DAILY LIVING (ADL)
ADLS_ACUITY_SCORE: 35

## 2023-12-15 NOTE — PATIENT INSTRUCTIONS
Given your current symptoms, duration of symptoms, worsening symptoms and EKG changes in clinic today from two days ago; it is advised that you proceed to the emergency department for further evaluation and treatment of your symptoms.

## 2023-12-15 NOTE — PROGRESS NOTES
ASSESSMENT:   (R07.9) Chest pain, unspecified type  (primary encounter diagnosis)  Plan: EKG 12-lead complete w/read - Clinics    PLAN:  The patient and I discussed that given his current symptoms, duration of symptoms, worsening symptoms and EKG changes in the clinic today from 2 days ago; it is advised that he proceed to an emergency department for further evaluation and treatment of his symptoms.  Patient acknowledged his understanding of the above plan and indicated he would proceed to an emergency department for further evaluation and treatment.    The use of Dragon[employer] services may have been used to construct the content in this note; any grammatical or spelling errors are non-intentional. Please contact the author of this note directly if you are in need of any clarification.      Houston Torres, APRN CNP      SUBJECTIVE:  Drew Bradford is a 63 year old male who presents to the office with complaints of chest pain/pressure, shortness of breath and weakness in his legs.  He has been having more episodes of these since he was seen two days ago and these are lasting longer than they were previously.    The pain is characterized as mild to severe pressure and sharp located left chest with radiation to none.  Symptoms began 2 day(s) ago    ROS:  Negative except noted above.     OBJECTIVE:   Blood pressure 125/80, pulse 68, temperature 97.6  F (36.4  C), temperature source Tympanic, SpO2 96%.  GENERAL APPEARANCE: healthy, alert and no distress  EYES: EOMI,  PERRL, conjunctiva clear  RESP: lungs clear to auscultation - no rales, rhonchi or wheezes  CV: regular rates and rhythm, no murmur noted  NEURO: normal speech and mentation

## 2023-12-16 ENCOUNTER — APPOINTMENT (OUTPATIENT)
Dept: CARDIOLOGY | Facility: CLINIC | Age: 63
End: 2023-12-16
Attending: PHYSICIAN ASSISTANT
Payer: COMMERCIAL

## 2023-12-16 LAB
ALBUMIN SERPL BCG-MCNC: 3.9 G/DL (ref 3.5–5.2)
ALP SERPL-CCNC: 46 U/L (ref 40–150)
ALT SERPL W P-5'-P-CCNC: 46 U/L (ref 0–70)
ANION GAP SERPL CALCULATED.3IONS-SCNC: 7 MMOL/L (ref 7–15)
AST SERPL W P-5'-P-CCNC: 40 U/L (ref 0–45)
BILIRUB SERPL-MCNC: 0.9 MG/DL
BUN SERPL-MCNC: 10.1 MG/DL (ref 8–23)
CALCIUM SERPL-MCNC: 8.6 MG/DL (ref 8.8–10.2)
CHLORIDE SERPL-SCNC: 108 MMOL/L (ref 98–107)
CHOLEST SERPL-MCNC: 186 MG/DL
CREAT SERPL-MCNC: 0.82 MG/DL (ref 0.67–1.17)
DEPRECATED HCO3 PLAS-SCNC: 26 MMOL/L (ref 22–29)
EGFRCR SERPLBLD CKD-EPI 2021: >90 ML/MIN/1.73M2
ERYTHROCYTE [DISTWIDTH] IN BLOOD BY AUTOMATED COUNT: 11.8 % (ref 10–15)
GLUCOSE SERPL-MCNC: 109 MG/DL (ref 70–99)
HCT VFR BLD AUTO: 43.8 % (ref 40–53)
HDLC SERPL-MCNC: 43 MG/DL
HGB BLD-MCNC: 14.6 G/DL (ref 13.3–17.7)
LDLC SERPL CALC-MCNC: 121 MG/DL
LVEF ECHO: NORMAL
MCH RBC QN AUTO: 29.7 PG (ref 26.5–33)
MCHC RBC AUTO-ENTMCNC: 33.3 G/DL (ref 31.5–36.5)
MCV RBC AUTO: 89 FL (ref 78–100)
NONHDLC SERPL-MCNC: 143 MG/DL
PLATELET # BLD AUTO: 155 10E3/UL (ref 150–450)
POTASSIUM SERPL-SCNC: 3.9 MMOL/L (ref 3.4–5.3)
PROT SERPL-MCNC: 6.5 G/DL (ref 6.4–8.3)
RBC # BLD AUTO: 4.92 10E6/UL (ref 4.4–5.9)
SODIUM SERPL-SCNC: 141 MMOL/L (ref 135–145)
TRIGL SERPL-MCNC: 110 MG/DL
TROPONIN T SERPL HS-MCNC: 322 NG/L
TROPONIN T SERPL HS-MCNC: 345 NG/L
UFH PPP CHRO-ACNC: 0.45 IU/ML
UFH PPP CHRO-ACNC: 0.51 IU/ML
WBC # BLD AUTO: 6.6 10E3/UL (ref 4–11)

## 2023-12-16 PROCEDURE — 250N000011 HC RX IP 250 OP 636: Performed by: INTERNAL MEDICINE

## 2023-12-16 PROCEDURE — 250N000013 HC RX MED GY IP 250 OP 250 PS 637: Performed by: PHYSICIAN ASSISTANT

## 2023-12-16 PROCEDURE — 250N000011 HC RX IP 250 OP 636: Mod: JZ | Performed by: PHYSICIAN ASSISTANT

## 2023-12-16 PROCEDURE — 85520 HEPARIN ASSAY: CPT | Performed by: INTERNAL MEDICINE

## 2023-12-16 PROCEDURE — 99153 MOD SED SAME PHYS/QHP EA: CPT | Performed by: INTERNAL MEDICINE

## 2023-12-16 PROCEDURE — 210N000002 HC R&B HEART CARE

## 2023-12-16 PROCEDURE — 36415 COLL VENOUS BLD VENIPUNCTURE: CPT | Performed by: INTERNAL MEDICINE

## 2023-12-16 PROCEDURE — 250N000011 HC RX IP 250 OP 636: Mod: JZ | Performed by: INTERNAL MEDICINE

## 2023-12-16 PROCEDURE — 99232 SBSQ HOSP IP/OBS MODERATE 35: CPT | Performed by: INTERNAL MEDICINE

## 2023-12-16 PROCEDURE — 272N000001 HC OR GENERAL SUPPLY STERILE: Performed by: INTERNAL MEDICINE

## 2023-12-16 PROCEDURE — 80061 LIPID PANEL: CPT | Performed by: PHYSICIAN ASSISTANT

## 2023-12-16 PROCEDURE — 93454 CORONARY ARTERY ANGIO S&I: CPT | Mod: 26 | Performed by: INTERNAL MEDICINE

## 2023-12-16 PROCEDURE — 84484 ASSAY OF TROPONIN QUANT: CPT | Performed by: PHYSICIAN ASSISTANT

## 2023-12-16 PROCEDURE — 258N000003 HC RX IP 258 OP 636: Performed by: STUDENT IN AN ORGANIZED HEALTH CARE EDUCATION/TRAINING PROGRAM

## 2023-12-16 PROCEDURE — 94660 CPAP INITIATION&MGMT: CPT

## 2023-12-16 PROCEDURE — 250N000009 HC RX 250: Performed by: INTERNAL MEDICINE

## 2023-12-16 PROCEDURE — 5A09357 ASSISTANCE WITH RESPIRATORY VENTILATION, LESS THAN 24 CONSECUTIVE HOURS, CONTINUOUS POSITIVE AIRWAY PRESSURE: ICD-10-PCS | Performed by: INTERNAL MEDICINE

## 2023-12-16 PROCEDURE — 99152 MOD SED SAME PHYS/QHP 5/>YRS: CPT | Performed by: INTERNAL MEDICINE

## 2023-12-16 PROCEDURE — 93454 CORONARY ARTERY ANGIO S&I: CPT | Performed by: INTERNAL MEDICINE

## 2023-12-16 PROCEDURE — 93306 TTE W/DOPPLER COMPLETE: CPT

## 2023-12-16 PROCEDURE — B2111ZZ FLUOROSCOPY OF MULTIPLE CORONARY ARTERIES USING LOW OSMOLAR CONTRAST: ICD-10-PCS | Performed by: INTERNAL MEDICINE

## 2023-12-16 PROCEDURE — 99255 IP/OBS CONSLTJ NEW/EST HI 80: CPT | Mod: 25 | Performed by: INTERNAL MEDICINE

## 2023-12-16 PROCEDURE — 80053 COMPREHEN METABOLIC PANEL: CPT | Performed by: PHYSICIAN ASSISTANT

## 2023-12-16 PROCEDURE — C1894 INTRO/SHEATH, NON-LASER: HCPCS | Performed by: INTERNAL MEDICINE

## 2023-12-16 PROCEDURE — C1769 GUIDE WIRE: HCPCS | Performed by: INTERNAL MEDICINE

## 2023-12-16 PROCEDURE — C1887 CATHETER, GUIDING: HCPCS | Performed by: INTERNAL MEDICINE

## 2023-12-16 PROCEDURE — 99152 MOD SED SAME PHYS/QHP 5/>YRS: CPT | Mod: GC | Performed by: INTERNAL MEDICINE

## 2023-12-16 PROCEDURE — 36415 COLL VENOUS BLD VENIPUNCTURE: CPT | Performed by: PHYSICIAN ASSISTANT

## 2023-12-16 PROCEDURE — 85027 COMPLETE CBC AUTOMATED: CPT | Performed by: PHYSICIAN ASSISTANT

## 2023-12-16 PROCEDURE — 93306 TTE W/DOPPLER COMPLETE: CPT | Mod: 26 | Performed by: INTERNAL MEDICINE

## 2023-12-16 PROCEDURE — 999N000157 HC STATISTIC RCP TIME EA 10 MIN

## 2023-12-16 RX ORDER — FENTANYL CITRATE 50 UG/ML
25 INJECTION, SOLUTION INTRAMUSCULAR; INTRAVENOUS
Status: ACTIVE | OUTPATIENT
Start: 2023-12-16 | End: 2023-12-16

## 2023-12-16 RX ORDER — LORAZEPAM 2 MG/ML
0.5 INJECTION INTRAMUSCULAR EVERY 6 HOURS PRN
Status: DISCONTINUED | OUTPATIENT
Start: 2023-12-16 | End: 2023-12-20

## 2023-12-16 RX ORDER — NALOXONE HYDROCHLORIDE 0.4 MG/ML
0.2 INJECTION, SOLUTION INTRAMUSCULAR; INTRAVENOUS; SUBCUTANEOUS
Status: ACTIVE | OUTPATIENT
Start: 2023-12-16 | End: 2023-12-16

## 2023-12-16 RX ORDER — ACETAMINOPHEN 325 MG/1
650 TABLET ORAL EVERY 4 HOURS PRN
Status: DISCONTINUED | OUTPATIENT
Start: 2023-12-16 | End: 2023-12-16

## 2023-12-16 RX ORDER — FENTANYL CITRATE 50 UG/ML
INJECTION, SOLUTION INTRAMUSCULAR; INTRAVENOUS
Status: DISCONTINUED | OUTPATIENT
Start: 2023-12-16 | End: 2023-12-16 | Stop reason: HOSPADM

## 2023-12-16 RX ORDER — SODIUM CHLORIDE 9 MG/ML
75 INJECTION, SOLUTION INTRAVENOUS CONTINUOUS
Status: ACTIVE | OUTPATIENT
Start: 2023-12-16 | End: 2023-12-16

## 2023-12-16 RX ORDER — HEPARIN SODIUM 1000 [USP'U]/ML
INJECTION, SOLUTION INTRAVENOUS; SUBCUTANEOUS
Status: DISCONTINUED | OUTPATIENT
Start: 2023-12-16 | End: 2023-12-16 | Stop reason: HOSPADM

## 2023-12-16 RX ORDER — POTASSIUM CHLORIDE 1500 MG/1
20 TABLET, EXTENDED RELEASE ORAL
Status: DISCONTINUED | OUTPATIENT
Start: 2023-12-16 | End: 2023-12-16 | Stop reason: HOSPADM

## 2023-12-16 RX ORDER — LIDOCAINE 40 MG/G
CREAM TOPICAL
Status: DISCONTINUED | OUTPATIENT
Start: 2023-12-16 | End: 2023-12-16 | Stop reason: HOSPADM

## 2023-12-16 RX ORDER — IOPAMIDOL 755 MG/ML
INJECTION, SOLUTION INTRAVASCULAR
Status: DISCONTINUED | OUTPATIENT
Start: 2023-12-16 | End: 2023-12-16 | Stop reason: HOSPADM

## 2023-12-16 RX ORDER — NITROGLYCERIN 0.4 MG/1
0.4 TABLET SUBLINGUAL EVERY 5 MIN PRN
Status: DISCONTINUED | OUTPATIENT
Start: 2023-12-16 | End: 2023-12-20

## 2023-12-16 RX ORDER — OXYCODONE HYDROCHLORIDE 5 MG/1
5 TABLET ORAL EVERY 4 HOURS PRN
Status: DISCONTINUED | OUTPATIENT
Start: 2023-12-16 | End: 2023-12-19

## 2023-12-16 RX ORDER — ATROPINE SULFATE 0.1 MG/ML
0.5 INJECTION INTRAVENOUS
Status: ACTIVE | OUTPATIENT
Start: 2023-12-16 | End: 2023-12-16

## 2023-12-16 RX ORDER — NALOXONE HYDROCHLORIDE 0.4 MG/ML
0.4 INJECTION, SOLUTION INTRAMUSCULAR; INTRAVENOUS; SUBCUTANEOUS
Status: ACTIVE | OUTPATIENT
Start: 2023-12-16 | End: 2023-12-16

## 2023-12-16 RX ORDER — ASPIRIN 81 MG/1
81 TABLET, CHEWABLE ORAL DAILY
Status: DISCONTINUED | OUTPATIENT
Start: 2023-12-16 | End: 2023-12-19

## 2023-12-16 RX ORDER — VERAPAMIL HYDROCHLORIDE 2.5 MG/ML
INJECTION, SOLUTION INTRAVENOUS
Status: DISCONTINUED | OUTPATIENT
Start: 2023-12-16 | End: 2023-12-16 | Stop reason: HOSPADM

## 2023-12-16 RX ORDER — FLUMAZENIL 0.1 MG/ML
0.2 INJECTION, SOLUTION INTRAVENOUS
Status: ACTIVE | OUTPATIENT
Start: 2023-12-16 | End: 2023-12-16

## 2023-12-16 RX ORDER — LORAZEPAM 0.5 MG/1
0.5 TABLET ORAL EVERY 6 HOURS PRN
Status: DISCONTINUED | OUTPATIENT
Start: 2023-12-16 | End: 2023-12-20

## 2023-12-16 RX ORDER — MORPHINE SULFATE 2 MG/ML
1 INJECTION, SOLUTION INTRAMUSCULAR; INTRAVENOUS
Status: DISCONTINUED | OUTPATIENT
Start: 2023-12-16 | End: 2023-12-19

## 2023-12-16 RX ORDER — OXYCODONE HYDROCHLORIDE 5 MG/1
10 TABLET ORAL EVERY 4 HOURS PRN
Status: DISCONTINUED | OUTPATIENT
Start: 2023-12-16 | End: 2023-12-19

## 2023-12-16 RX ORDER — ASPIRIN 325 MG
325 TABLET ORAL ONCE
Status: DISCONTINUED | OUTPATIENT
Start: 2023-12-16 | End: 2023-12-16 | Stop reason: HOSPADM

## 2023-12-16 RX ORDER — SODIUM CHLORIDE 9 MG/ML
INJECTION, SOLUTION INTRAVENOUS CONTINUOUS
Status: DISCONTINUED | OUTPATIENT
Start: 2023-12-16 | End: 2023-12-16 | Stop reason: HOSPADM

## 2023-12-16 RX ORDER — LORAZEPAM 2 MG/ML
0.5 INJECTION INTRAMUSCULAR
Status: DISCONTINUED | OUTPATIENT
Start: 2023-12-16 | End: 2023-12-16 | Stop reason: HOSPADM

## 2023-12-16 RX ORDER — NITROGLYCERIN 5 MG/ML
VIAL (ML) INTRAVENOUS
Status: DISCONTINUED | OUTPATIENT
Start: 2023-12-16 | End: 2023-12-16 | Stop reason: HOSPADM

## 2023-12-16 RX ORDER — LORAZEPAM 0.5 MG/1
0.5 TABLET ORAL
Status: DISCONTINUED | OUTPATIENT
Start: 2023-12-16 | End: 2023-12-16 | Stop reason: HOSPADM

## 2023-12-16 RX ORDER — ASPIRIN 81 MG/1
243 TABLET, CHEWABLE ORAL ONCE
Status: DISCONTINUED | OUTPATIENT
Start: 2023-12-16 | End: 2023-12-16 | Stop reason: HOSPADM

## 2023-12-16 RX ADMIN — DOCUSATE SODIUM 50 MG AND SENNOSIDES 8.6 MG 2 TABLET: 8.6; 5 TABLET, FILM COATED ORAL at 21:55

## 2023-12-16 RX ADMIN — HEPARIN SODIUM 850 UNITS/HR: 10000 INJECTION, SOLUTION INTRAVENOUS at 21:50

## 2023-12-16 RX ADMIN — SODIUM CHLORIDE 75 ML/HR: 9 INJECTION, SOLUTION INTRAVENOUS at 15:13

## 2023-12-16 RX ADMIN — ASPIRIN 81 MG CHEWABLE TABLET 81 MG: 81 TABLET CHEWABLE at 08:25

## 2023-12-16 RX ADMIN — DOCUSATE SODIUM 50 MG AND SENNOSIDES 8.6 MG 1 TABLET: 8.6; 5 TABLET, FILM COATED ORAL at 08:25

## 2023-12-16 RX ADMIN — AMLODIPINE BESYLATE 10 MG: 10 TABLET ORAL at 08:25

## 2023-12-16 RX ADMIN — ATORVASTATIN CALCIUM 40 MG: 40 TABLET, FILM COATED ORAL at 21:55

## 2023-12-16 RX ADMIN — METOPROLOL TARTRATE 12.5 MG: 25 TABLET, FILM COATED ORAL at 21:52

## 2023-12-16 RX ADMIN — METOPROLOL TARTRATE 12.5 MG: 25 TABLET, FILM COATED ORAL at 08:25

## 2023-12-16 ASSESSMENT — ACTIVITIES OF DAILY LIVING (ADL)
ADLS_ACUITY_SCORE: 35

## 2023-12-16 NOTE — CONSULTS
[facility]    Cardiology Consultation     Date of Admission:  12/15/2023    Assessment & Plan   Drew Bradford is a 63 year old male who was admitted on 12/15/2023.      Non-ST elevation myocardial infarction.  Currently chest pain-free.  Last chest pain was yesterday night lasting for about a minute.  EKG concerning for Wellens sign possible LAD disease.  Discussed with patient and his family at bedside his clinical presentation which is consistent with acute coronary syndrome.  Discussed options of medical management versus medical management plus coronary angiogram.  Risk benefits of coronary angiogram discussed in detail with patient with risks including but not limited risk of stroke, MI, death, need for PRBC transfusion, emergent bypass surgery.  Patient does not have any bleeding issues or anticipated surgery.  He understands the risk of coronary angiogram the alternative only medical therapy and wishes to proceed with it.  He had some breakfast this morning around 630 7:00.  Continue aspirin, heparin drip, Lipitor.  Agree with echocardiogram which is pending  History of hypertension was on amlodipine prior to admission.  Dyslipidemia  started on Lipitor this admission.    Recommendations  Continue aspirin, heparin, Lipitor, low-dose beta-blocker, amlodipine  Agree with echocardiogram which is pending  Coronary angiogram the possible revascularization.  He had breakfast this morning.  Discussed with Dr. Redmond interventional colleague we are planning to do coronary angiogram this afternoon.  Recommend patient to continue NPO.      High complexity     Jayy Gloria MD, MD    Primary Care Physician   Tara Mehta    Reason for Consult   Reason for consult: I was asked to evaluate this patient for chest.    History of Present Illness   Drew Bradford is a 63 year old male who presents with 3 weeks of ongoing chest pain that started initially with exertion progressing to chest pain at  rest.  Upon presentation he was found to have some T wave inversion in bifid pattern on EKG concerning for Wellens sign in anterior precordial leads, troponin elevated at 289 rising to 345.  He had chest pain episode last night for a few minutes.  He has been chest pain-free since that time.  He takes amlodipine for hypertension.  No bleeding issues no anticipated surgery.  He speaks Mandarin.  Kelly Van [employer]  used for this review.    Past Medical History   Past Medical History:   Diagnosis Date    HTN (hypertension)     Hyperlipidemia LDL goal <100     Nocturia     Presbyopia     Seasonal allergies          Past Surgical History   Past Surgical History:   Procedure Laterality Date    COLONOSCOPY N/A 7/16/2018    Procedure: COLONOSCOPY;  colonoscopy;  Surgeon: Geremias Strickland MD;  Location:  GI    ESOPHAGOSCOPY, GASTROSCOPY, DUODENOSCOPY (EGD), COMBINED N/A 1/14/2020    Procedure: ESOPHAGOGASTRODUODENOSCOPY, WITH BIOPSY;  Surgeon: Tez Vences MD;  Location: [facility]    Z REMOVAL OF KIDNEY STONE           Prior to Admission Medications   Prior to Admission Medications   Prescriptions Last Dose Informant Patient Reported? Taking?   amLODIPine (NORVASC) 10 MG tablet 12/15/2023 at am  No Yes   Sig: Take 1 tablet (10 mg) by mouth daily Take 10 mg by mouth daily      Facility-Administered Medications: None     Current Facility-Administered Medications   Medication Dose Route Frequency    amLODIPine  10 mg Oral Daily    aspirin  81 mg Oral Daily    atorvastatin  40 mg Oral QPM    metoprolol tartrate  12.5 mg Oral BID    senna-docusate  1 tablet Oral BID    Or    senna-docusate  2 tablet Oral BID    sodium chloride (PF)  3 mL Intracatheter Q8H     Current Facility-Administered Medications   Medication Last Rate    heparin 1,050 Units/hr (12/15/23 6726)    - MEDICATION INSTRUCTIONS -      - MEDICATION INSTRUCTIONS -      ACE/ARB/ARNI NOT PRESCRIBED       Allergies   Allergies   Allergen Reactions     "Cephalosporins Anaphylaxis    Penicillin G Anaphylaxis    Amoxicillin Cramps     Kidney problem    Fish Oil Hives    Shrimp Hives    Penicillins     Amoxicillin Rash     Back pain, kidney pain.     Misc. Sulfonamide Containing Compounds        Social History    reports that he has quit smoking. He has never used smokeless tobacco. He reports current alcohol use of about 2.0 standard drinks of alcohol per week. He reports that he does not use drugs.      Family History   I have reviewed this patient's family history and updated it with pertinent information if needed.  Family History   Problem Relation Age of Onset    No Known Problems Mother     No Known Problems Father           Review of Systems   A comprehensive review of system was performed and is negative other than that noted in the HPI or here.     Physical Exam   Vital Signs with Ranges  Temp:  [96.8  F (36  C)-98  F (36.7  C)] 98  F (36.7  C)  Pulse:  [61-68] 64  Resp:  [12-20] 19  BP: (116-147)/() 116/77  SpO2:  [92 %-99 %] 98 %  Wt Readings from Last 4 Encounters:   12/16/23 83.5 kg (184 lb)   12/13/23 86.6 kg (191 lb)   10/23/23 89 kg (196 lb 3.2 oz)   08/18/23 92.1 kg (203 lb)     No intake/output data recorded.      Vitals: /77 (BP Location: Left arm)   Pulse 64   Temp 98  F (36.7  C) (Oral)   Resp 19   Ht 1.75 m (5' 8.9\")   Wt 83.5 kg (184 lb)   SpO2 98%   BMI 27.25 kg/m      Physical Exam:   General - Alert and oriented to time place and person in no acute distress  Eyes - No scleral icterus  HEENT - Neck supple, moist mucous membranes  Cardiovascular -S1-S2 normal no murmur rub or gallop  Extremities - There is no pitting pedal edema  Respiratory -clear to auscultation  Skin - No pallor or cyanosis  Gastrointestinal - Non tender and non distended without rebound or guarding  Psych - Appropriate affect   Neurological - No gross motor neurological focal deficits    No lab results found in last 7 days.    Invalid input(s): " "\"TROPONINIES\"    Recent Labs   Lab 12/16/23  0636 12/15/23  1823   WBC 6.6 8.5   HGB 14.6 15.0   MCV 89 90    170    139   POTASSIUM 3.9 4.6   CHLORIDE 108* 103   CO2 26 27   BUN 10.1 13.0   CR 0.82 0.91   GFRESTIMATED >90 >90   ANIONGAP 7 9   STEPHEN 8.6* 8.9   * 105*   ALBUMIN 3.9  --    PROTTOTAL 6.5  --    BILITOTAL 0.9  --    ALKPHOS 46  --    ALT 46  --    AST 40  --      Recent Labs   Lab Test 08/18/23  1054 07/13/22  1003   CHOL 195 197   HDL 42 47   * 126*   TRIG 145 119     Recent Labs   Lab 12/16/23  0636 12/15/23  1823   WBC 6.6 8.5   HGB 14.6 15.0   HCT 43.8 44.7   MCV 89 90    170     No results for input(s): \"PH\", \"PHV\", \"PO2\", \"PO2V\", \"SAT\", \"PCO2\", \"PCO2V\", \"HCO3\", \"HCO3V\" in the last 168 hours.  No results for input(s): \"NTBNPI\", \"NTBNP\" in the last 168 hours.  Recent Labs   Lab 12/15/23  1823   DD <0.27     No results for input(s): \"SED\", \"CRP\" in the last 168 hours.  Recent Labs   Lab 12/16/23  0636 12/15/23  1823    170     No results for input(s): \"TSH\" in the last 168 hours.  No results for input(s): \"COLOR\", \"APPEARANCE\", \"URINEGLC\", \"URINEBILI\", \"URINEKETONE\", \"SG\", \"UBLD\", \"URINEPH\", \"PROTEIN\", \"UROBILINOGEN\", \"NITRITE\", \"LEUKEST\", \"RBCU\", \"WBCU\" in the last 168 hours.    Imaging:  Recent Results (from the past 48 hour(s))   Chest XR,  PA & LAT    Narrative    EXAM: XR CHEST 2 VIEWS  LOCATION: [facility]  DATE: 12/15/2023    INDICATION: chest pain, shortness of breath  COMPARISON: None.      Impression    IMPRESSION: Heart size and pulmonary vascularity normal. Minimal atelectasis left base, lungs otherwise clear.       Echo:  No results found for this or any previous visit (from the past 4320 hour(s)).    Clinically Significant Risk Factors Present on Admission                  # Hypertension: Noted on problem list      # Overweight: Estimated body mass index is 27.25 kg/m  as calculated from the following:    Height as of this " "encounter: 1.75 m (5' 8.9\").    Weight as of this encounter: 83.5 kg (184 lb).               "

## 2023-12-16 NOTE — PROGRESS NOTES
A&O x 4, calm and cooperative for cares. Hannah speaking, VSS on RA, Denied pain/SOB. Up SBA to bathroom. Heparin gtt at 1050 units/hr. Hep 10 a 10:30 am, Therapeutic x 1. NPO after midnight. Tele SB/SR. Plan for Echo today. Continue plan of care.

## 2023-12-16 NOTE — CARE PLAN
[id number]  Patient received from ED at 2210, with the wife at the bedside. Vitals WNL on arrival. RT called for CPAP at night. Denies pain. On Heparin gtt at 1050units/hr. Mandarin speaking. Care endorsed  Douglas Pettit RN on 12/15/2023 at 11:01 PM

## 2023-12-16 NOTE — PROGRESS NOTES
"[facility]    Hospitalist Progress Note    Assessment & Plan   Drew Bradford is a 63 year old Mandarin-speaking male with PMHx of hypertension and AWA on CPAP who was admitted on 12/15/2023 with an NSTEMI    NSTEMI  Hypertension  *Home meds: amlodipine 10mg daily  *Presented to ED with intermittent chest pain over the past 3 weeks which, was initially only with work and exerting himself but subsequently progressed to occurring at rest. Was seen in urgent care 2 days prior to admission and recommended to go to the ED but he declined. He presented to urgent care day of admission and had T wave inversions in V1/V2 with reports of cp/sob so he was sent to the ED for evaluation.   *In ED, was afebrile and VSS. Repeat EKG showed in ED showed resolution of T wave inversion in V2. Trops elevated (273--289--345). D-dimer nl. CXR neg. Given ASA, started on a heparin gtt. Cardiology consulted.   *Echo showed mildly reduced EF at 45-50% and areas of WMAs; nl RV size/function.  *FLP with total cholest 186, , HDL 43, TGs 110.   -- cont heparin gtt  -- cont ASA 81mg daily  -- cont atorvastatin 40mg daily  -- cont metoprolol 12.5mg BID (added this stay)  -- presently continues on amlodipine, anticipate transition to ACEI this stay given reduced EF  -- further evaluation with coronary angiogram today (12/16)    AWA on CPAP  *Chronic and stable on nocturnal CPAP     FEN: no IVFs, lytes stable, NPO for angiogram today  DVT Prophylaxis: heparin gtt  Code Status: Full Code    Clinically Significant Risk Factors Present on Admission                  # Hypertension: Noted on problem list      # Overweight: Estimated body mass index is 27.25 kg/m  as calculated from the following:    Height as of this encounter: 1.75 m (5' 8.9\").    Weight as of this encounter: 83.5 kg (184 lb).              Disposition: Discharge pending findings on coronary angiogram and tolerance of new medications this stay, suspect " 1-2d.    Spouse at bedside, questions answered.     Marissa Holbrook, DO    Medical Decision Making       Please see A&P for additional details of medical decision making.       Interval History   Chart reviewed. Seen this afternoon. Resting comfortably. Denies cp/sob/cough, abd pain/n/v.     -Data reviewed today: I reviewed all new labs and imaging results over the last 24 hours. I personally reviewed no images or EKG's today.    Physical Exam   Temp: 97.9  F (36.6  C) Temp src: Oral BP: 120/81 Pulse: 61   Resp: 18 SpO2: 97 % O2 Device: None (Room air)    Vitals:    12/15/23 2225 12/16/23 0600   Weight: 85.9 kg (189 lb 6 oz) 83.5 kg (184 lb)     Vital Signs with Ranges  Temp:  [96.8  F (36  C)-98  F (36.7  C)] 97.9  F (36.6  C)  Pulse:  [61-68] 61  Resp:  [12-20] 18  BP: (116-147)/() 120/81  SpO2:  [92 %-99 %] 97 %  No intake/output data recorded.    Constitutional: Resting comfortably, alert, NAD  Respiratory: CTAB, no wheeze, no increased work of breathing  Cardiovascular: HRRR, no MGR, no LE edema  GI: S, NT, ND, +BS  Skin/Integumen: warm/dry  Other:      Medications    heparin 850 Units/hr (12/16/23 1317)    - MEDICATION INSTRUCTIONS -      - MEDICATION INSTRUCTIONS -      ACE/ARB/ARNI NOT PRESCRIBED        amLODIPine  10 mg Oral Daily    aspirin  81 mg Oral Daily    atorvastatin  40 mg Oral QPM    metoprolol tartrate  12.5 mg Oral BID    senna-docusate  1 tablet Oral BID    Or    senna-docusate  2 tablet Oral BID    sodium chloride (PF)  3 mL Intracatheter Q8H       Data   Recent Labs   Lab 12/16/23  0636 12/15/23  1823   WBC 6.6 8.5   HGB 14.6 15.0   MCV 89 90    170    139   POTASSIUM 3.9 4.6   CHLORIDE 108* 103   CO2 26 27   BUN 10.1 13.0   CR 0.82 0.91   ANIONGAP 7 9   STEPHEN 8.6* 8.9   * 105*   ALBUMIN 3.9  --    PROTTOTAL 6.5  --    BILITOTAL 0.9  --    ALKPHOS 46  --    ALT 46  --    AST 40  --        Recent Results (from the past 24 hour(s))   Chest XR,  PA & LAT     Narrative    EXAM: XR CHEST 2 VIEWS  LOCATION: [facility]  DATE: 12/15/2023    INDICATION: chest pain, shortness of breath  COMPARISON: None.      Impression    IMPRESSION: Heart size and pulmonary vascularity normal. Minimal atelectasis left base, lungs otherwise clear.   Echocardiogram Complete   Result Value    LVEF  45-50%    Narrative    [id number]  [id number]  [id number]  [id number]^MARY^CRISTIANE^PAPITO     [facility]  Echocardiography Laboratory  [address] [place] [id number]     Name: ADEOLA FOX  MRN: [id number]  : 1960  Study Date: 2023 10:46 AM  Age: 63 yrs  Gender: Male  Patient Location: [facility]  Reason For Study: Chest Pain, Chest Tightness, Chest Pressure  Ordering Physician: CRISTIANE HERNANDEZ  Referring Physician: DENISHA SOARES  Performed By: Maria Victoria Herndon     BSA: 2.0 m2  Height: 68 in  Weight: 184 lb  HR: 59  BP: 116/77 mmHg  ______________________________________________________________________________  Procedure  Complete Echo Adult.  ______________________________________________________________________________  Interpretation Summary     The left ventricle is normal in size.  Left ventricular systolic function is mildly reduced.  The visual ejection fraction is 45-50%.  There are regional wall motion abnormalities as specified.  The right ventricle is normal size.  The right ventricular systolic function is normal.  Normal left atrial size.  The inferior vena cava was normal in size with preserved respiratory  variability.  ______________________________________________________________________________  Left Ventricle  The left ventricle is normal in size. Left ventricular systolic function is  mildly reduced. The visual ejection fraction is 45-50%. There is apical,  anterior (midâ  distal), anteroseptal (midâ  distal) and inferoseptal (midâ  distal)  hypokinesis. There are regional wall motion abnormalities as specified.      Right Ventricle  The right ventricle is normal size. The right ventricular systolic function is  normal.     Atria  Normal left atrial size. Right atrial size is normal.     Mitral Valve  The mitral valve leaflets appear normal. There is no evidence of stenosis,  fluttering, or prolapse. There is trace mitral regurgitation.     Tricuspid Valve  Normal tricuspid valve. The right ventricular systolic pressure is  approximated at 22mmHg plus the right atrial pressure. There is trace  tricuspid regurgitation.     Aortic Valve  The aortic valve is trileaflet. No aortic regurgitation is present.     Pulmonic Valve  The pulmonic valve is not well visualized.     Vessels  Normal size aorta. Normal size ascending aorta. The inferior vena cava was  normal in size with preserved respiratory variability.     Pericardium  There is no pericardial effusion.     Rhythm  Sinus rhythm was noted.  ______________________________________________________________________________  MMode/2D Measurements & Calculations  IVSd: 0.99 cm     LVIDd: 4.7 cm  LVIDs: 3.3 cm  LVPWd: 0.88 cm  FS: 30.4 %  LV mass(C)d: 148.7 grams  LV mass(C)dI: 75.4 grams/m2  Ao root diam: 3.5 cm  asc Aorta Diam: 3.7 cm  LVOT diam: 2.0 cm  LVOT area: 3.1 cm2  Ao root diam index Ht(cm/m): 2.0  Ao root diam index BSA (cm/m2): 1.8  Asc Ao diam index BSA (cm/m2): 1.9  Asc Ao diam index Ht(cm/m): 2.1  LA Volume (BP): 29.5 ml     LA Volume Index (BP): 15.0 ml/m2  RWT: 0.38  TAPSE: 2.6 cm     Doppler Measurements & Calculations  MV E max richard: 78.1 cm/sec  MV A max richard: 95.9 cm/sec  MV E/A: 0.81  MV dec time: 0.23 sec  Ao V2 max: 145.0 cm/sec  Ao max P.0 mmHg  Ao V2 mean: 98.1 cm/sec  Ao mean P.0 mmHg  Ao V2 VTI: 31.7 cm  ANJEL(I,D): 2.2 cm2  ANJEL(V,D): 2.1 cm2  LV V1 max PG: 3.8 mmHg  LV V1 max: 98.0 cm/sec  LV V1 VTI: 22.3 cm  SV(LVOT): 70.1 ml  SI(LVOT): 35.5 ml/m2  PA V2 max: 111.0 cm/sec  PA max P.9 mmHg  PA acc time: 0.10 sec  AV Richard Ratio (DI): 0.68  ANJEL  Index (cm2/m2): 1.1  E/E' avg: 10.8  Lateral E/e': 9.6     Medial E/e': 12.1  RV S Richard: 14.5 cm/sec     ______________________________________________________________________________  Report approved by: Richie Rivera 12/16/2023 12:28 PM

## 2023-12-16 NOTE — ED TRIAGE NOTES
Patient here with c/o chest  pain and shortness of breath. He stated his symptoms started 20 days but it is more  frequent     Triage Assessment (Adult)       Row Name 12/15/23 9304          Triage Assessment    Airway WDL WDL        Respiratory WDL    Respiratory WDL WDL        Skin Circulation/Temperature WDL    Skin Circulation/Temperature WDL WDL        Cardiac WDL    Cardiac WDL WDL        Peripheral/Neurovascular WDL    Peripheral Neurovascular WDL WDL        Cognitive/Neuro/Behavioral WDL    Cognitive/Neuro/Behavioral WDL WDL

## 2023-12-16 NOTE — PHARMACY-ADMISSION MEDICATION HISTORY
Pharmacist Admission Medication History    Admission medication history is complete. The information provided in this note is only as accurate as the sources available at the time of the update.    Information Source(s): Patient, Family member, and CareEverywhere/SureScripts via phone    Pertinent Information:  utilized for medication history.     Changes made to PTA medication list:  Added: None  Deleted: ginko biloba, calcium   Changed: None    Allergies reviewed with patient and updates made in EHR: yes    Medication History Completed By: Yoli Cancino RPH 12/15/2023 8:10 PM    PTA Med List   Medication Sig Last Dose    amLODIPine (NORVASC) 10 MG tablet Take 1 tablet (10 mg) by mouth daily Take 10 mg by mouth daily 12/15/2023 at am

## 2023-12-16 NOTE — ED NOTES
Rapid Assessment Note    History:   Drew Bradford is a 63 year old male with a history of hypertension who presents with who presents with ongoing chest pain and shortness of breath since approximately 20 days ago. The pain is intermittent with each episode lasting about 5 to 6 minutes, but he had 2 episodes a couple days ago that lasted longer than usual. Also, he would initially only notice the pain when working, but now notices without movement. Furthermore, the pain make sit He does not have a history of heart disease, hyperlipidemia, diabetes mellitus, or smoking. Pertinent family history includes his mother who has coronary artery disease. He is not on blood thinners. He denies leg swelling or hemoptysis.       He notes that it is painful to breath    Exam:   General:  Alert, interactive  Cardiovascular:  Well perfused. RRR.  Lungs:  No respiratory distress, no accessory muscle use  Neuro:  Moving all 4 extremities  Skin:  Warm, dry  Psych:  Normal affect      Plan of Care:   I evaluated the patient and developed an initial plan of care. I discussed this plan and explained that I, or one of my partners, would be returning to complete the evaluation.         I, Victor Manuel Leger, am serving as a scribe to document services personally performed by Hung Zambrano, based on my observations and the provider's statements to me.    12/15/2023  [employer]    Portions of this medical record were completed by a scribe. UPON MY REVIEW AND AUTHENTICATION BY ELECTRONIC SIGNATURE, this confirms (a) I performed the applicable clinical services, and (b) the record is accurate.      Hung Zambrano MD  12/15/23 6468

## 2023-12-16 NOTE — ED NOTES
"[facility]  ED Nurse Handoff Report    ED Chief complaint: Chest Pain      ED Diagnosis:   Final diagnoses:   NSTEMI (non-ST elevated myocardial infarction) (H)       Code Status: Full Code    Allergies:   Allergies   Allergen Reactions    Cephalosporins Anaphylaxis    Penicillin G Anaphylaxis    Amoxicillin Cramps     Kidney problem    Fish Oil Hives    Shrimp Hives    Penicillins     Amoxicillin Rash     Back pain, kidney pain.     Misc. Sulfonamide Containing Compounds        Patient Story: Drew Bradford is a 63 year old male with a history of hypertension who presents with who presents with ongoing chest pain and shortness of breath since approximately 20 days ago. The pain is intermittent with each episode lasting about 5 to 6 minutes, but he had 2 episodes a couple days ago that lasted longer than usual. Also, he would initially only notice the pain when working, but now notices without movement. Furthermore, the pain make sit He does not have a history of heart disease, hyperlipidemia, diabetes mellitus, or smoking. Pertinent family history includes his mother who has coronary artery disease. He is not on blood thinners. He denies leg swelling or hemoptysis.    Focused Assessment:  Alert and oriented, no complaints of pain, pleasant and cooperative.  [facility]  Patient state they speak \"Simple english\"     Treatments and/or interventions provided: labs, imaging   Patient's response to treatments and/or interventions:   Labs Ordered and Resulted from Time of ED Arrival to Time of ED Departure   BASIC METABOLIC PANEL - Abnormal       Result Value    Sodium 139      Potassium 4.6      Chloride 103      Carbon Dioxide (CO2) 27      Anion Gap 9      Urea Nitrogen 13.0      Creatinine 0.91      GFR Estimate >90      Calcium 8.9      Glucose 105 (*)    TROPONIN T, HIGH SENSITIVITY - Abnormal    Troponin T, High Sensitivity 273 (*)    D DIMER QUANTITATIVE - Normal    D-Dimer Quantitative <0.27   "   CBC WITH PLATELETS AND DIFFERENTIAL    WBC Count 8.5      RBC Count 4.98      Hemoglobin 15.0      Hematocrit 44.7      MCV 90      MCH 30.1      MCHC 33.6      RDW 11.7      Platelet Count 170      % Neutrophils 70      % Lymphocytes 19      % Monocytes 7      % Eosinophils 3      % Basophils 1      % Immature Granulocytes 0      NRBCs per 100 WBC 0      Absolute Neutrophils 5.9      Absolute Lymphocytes 1.6      Absolute Monocytes 0.6      Absolute Eosinophils 0.3      Absolute Basophils 0.1      Absolute Immature Granulocytes 0.0      Absolute NRBCs 0.0     CBC WITH PLATELETS   TROPONIN T, HIGH SENSITIVITY         To be done/followed up on inpatient unit:      Does this patient have any cognitive concerns?:  none    Activity level - Baseline/Home:  Independent  Activity Level - Current:   Stand with Assist    Patient's Preferred language: Mandarin   Needed?: Yes    Isolation: None  Infection: Not Applicable  Patient tested for COVID 19 prior to admission: NO  Bariatric?: No    Vital Signs:   Vitals:    12/15/23 1812 12/15/23 2012 12/15/23 2030   BP: (!) 147/83 (!) 129/104 (!) 138/94   Pulse: 61 67 65   Resp: 20 15 20   Temp: 97.6  F (36.4  C)     TempSrc: Temporal     SpO2: 99% 99% 92%       Cardiac Rhythm:     Was the PSS-3 completed:   Yes  What interventions are required if any?               Family Comments: Wife at bedside   OBS brochure/video discussed/provided to patient/family: N/A              Name of person given brochure if not patient:               Relationship to patient:     For the majority of the shift this patient's behavior was Green.   Behavioral interventions performed were .    ED NURSE PHONE NUMBER: *[phone]

## 2023-12-16 NOTE — H&P
[facility]    History and Physical - Hospitalist Service       Date of Admission:  12/15/2023    Assessment & Plan   Drew Bradford is a 63 year old mandrin speaking male with past medical history significant for hypertension and obstructive sleep apnea on CPAP admitted on 12/15/2023 with NSTEMI.    Non ST elevation myocardial infarction (NSTEMI)  Presented with intermittent chest pain over the past 3 weeks which is initially only with work and exerting himself however now has progressed to rest.  Patient was seen in urgent care 2 days PTA and recommended to go to the ED but he declined. He presented to urgent care day of admission and had T wave inversions and chest pain or shortness of breath and sent to the ED.  He denies any radiation, associated lightheadedness, dizziness, sweating, or palpitations.  This pressure last 5 to 6 minutes however he has had some episodes that have lasted longer.  No personal history of cardiac issues, he takes amlodipine. Initial EKG at urgent care with T wave inversions in V1 and V2, and repeated in ER with resolution of inversion in V2. Troponin 273--> 289. Ddimer within normal limits. CXR minimal atelectasis to left base. Risk stratification: cardiac history in parents, medically manage no stents, gender, age  - Admit to heart center  - Telemetry  - Trend troponins  - Continue aspirin, given in the ED  - Heparin gtt  - Start low-dose BB with hold parameters  - Continue PTA CCB for now with hold parameters, defer to cardiology if they would like to switch this to an ACE inhibitor  - Statin  - Repeat EKG and nitroglycerin sublingual available for recurrent chest pain  - PRN IV antihypertensives for SBP >180  - Cardiology consultation  - Transthoracic echocardiogram  - NPO after midnight until seen by cardiology in case of possible coronary angiogram  - Check fasting lipid panel and HbA1c     Benign essential hypertension  PTA regimen: Amlodipine 10 mg daily  -  "Continue PTA antihypertensives with hold parameters  - PRN IV antihypertensives available for SBP >180  - Monitor BP trend and need for medication adjustments    AWA on CPAP: Nocturnal CPAP        Diet: Combination Diet Low Saturated Fat Na <2400mg Diet, No Caffeine Diet  NPO per Anesthesia Guidelines for Procedure/Surgery Except for: Meds  DVT Prophylaxis: Heparin gtt.  Marcelino Catheter: Not present  Lines: None     Cardiac Monitoring: None  Code Status: Full Code    Clinically Significant Risk Factors Present on Admission                  # Hypertension: Noted on problem list      # Overweight: Estimated body mass index is 28.05 kg/m  as calculated from the following:    Height as of this encounter: 1.75 m (5' 8.9\").    Weight as of this encounter: 85.9 kg (189 lb 6 oz).              Disposition Plan      Expected Discharge Date: 12/17/2023                The patient's care was discussed with the Attending Physician, Dr. Summers, Patient, and Patient's Family.    Minerva Burgos PA-C  Hospitalist Service  [facility]  Securely message with [employer] (more info)  Text page via [id number] Paging/Directory     ______________________________________________________________________    Chief Complaint   Chest pain    History is obtained from the patient, electronic health record, emergency department physician, and patient's significant other.  Phone  utilized during visit in order to communicate with patient and significant other.    History of Present Illness   Drew Bradford is a 63 year old mandrin speaking male with past medical history significant for hypertension and obstructive sleep apnea on CPAP who presented to the emergency department on 12/15/2023 with chest discomfort. Presented with intermittent chest pain over the past 3 weeks which is initially only with work and exerting himself however now has progressed to rest.  Patient was seen in urgent care 2 days PTA and recommended to go " to the ED but he declined. He presented to urgent care day of admission and had T wave inversions and chest pain or shortness of breath and sent to the ED.  He denies any radiation, associated lightheadedness, dizziness, sweating, or palpitations.  This pressure last 5 to 6 minutes however he has had some episodes that have lasted longer.  No personal history of cardiac issues, he takes amlodipine.     In the ED he was hemodynamically stable and slightly hypertensive.  He was chest pain-free in the emergency department.  Initial EKG at urgent care with T wave inversions in V1 and V2, and repeated in ER with resolution of inversion in V2.  Labs significant for troponin 273--> 289. Ddimer within normal limits. CXR minimal atelectasis to left base.  Patient was given 325 mg of aspirin and started on heparin gtt.      Past Medical History    Past Medical History:   Diagnosis Date    HTN (hypertension)     Hyperlipidemia LDL goal <100     Nocturia     Presbyopia     Seasonal allergies        Past Surgical History   Past Surgical History:   Procedure Laterality Date    COLONOSCOPY N/A 7/16/2018    Procedure: COLONOSCOPY;  colonoscopy;  Surgeon: Geremias Strickland MD;  Location: [facility]    ESOPHAGOSCOPY, GASTROSCOPY, DUODENOSCOPY (EGD), COMBINED N/A 1/14/2020    Procedure: ESOPHAGOGASTRODUODENOSCOPY, WITH BIOPSY;  Surgeon: Tez Vences MD;  Location: [facility] REMOVAL OF KIDNEY STONE         Prior to Admission Medications   Prior to Admission Medications   Prescriptions Last Dose Informant Patient Reported? Taking?   amLODIPine (NORVASC) 10 MG tablet 12/15/2023 at am  No Yes   Sig: Take 1 tablet (10 mg) by mouth daily Take 10 mg by mouth daily      Facility-Administered Medications: None        Review of Systems    The 10 point Review of Systems is negative other than noted in the HPI or here.     Social History   I have reviewed this patient's social history and updated it with pertinent information if  needed.  Social History     Tobacco Use    Smoking status: Former    Smokeless tobacco: Never    Tobacco comments:     Quit 20 years ago   Vaping Use    Vaping Use: Never used   Substance Use Topics    Alcohol use: Yes     Alcohol/week: 2.0 standard drinks of alcohol     Types: 2 Cans of beer per week     Comment: 2 cans of beer a week    Drug use: Never         Family History   Patient reports cardiac disease in his parents which is medically managed and they have not required stenting.    Allergies   Allergies   Allergen Reactions    Cephalosporins Anaphylaxis    Penicillin G Anaphylaxis    Amoxicillin Cramps     Kidney problem    Fish Oil Hives    Shrimp Hives    Penicillins     Amoxicillin Rash     Back pain, kidney pain.     Misc. Sulfonamide Containing Compounds         Physical Exam   Vital Signs: Temp: 96.8  F (36  C) Temp src: Axillary BP: 120/82 Pulse: 61   Resp: 16 SpO2: 99 % O2 Device: None (Room air)    Weight: 189 lbs 6 oz    Physical Exam    General: Awake, alert, very pleasant gentleman who appears stated age. Looks comfortable sitting up in bed. No acute distress.  HEENT: Normocephalic, atraumatic. Extraocular movements intact.   Respiratory: Clear to auscultation bilaterally, no rales, wheezing, or rhonchi.  Cardiovascular: Regular rate and rhythm, +S1 and S2, no murmur auscultated. No peripheral edema.   Gastrointestinal: Soft, non-tender, non-distended. Bowel sounds present.  Skin: Warm, dry. No obvious rashes or lesions on exposed skin. Dorsalis pedis pulses palpable bilaterally.  Musculoskeletal: No joint swelling, erythema or tenderness. Moves all extremities equally.  Neurologic: AAO x3.   Psychiatric: Appropriate mood and affect. No obvious anxiety or depression.      Medical Decision Making       60 MINUTES SPENT BY ME on the date of service doing chart review, history, exam, documentation & further activities per the note.      Data     I have personally reviewed the following data over  the past 24 hrs:    8.5  \   15.0   / 170     139 103 13.0 /  105 (H)   4.6 27 0.91 \     Trop: 289 (HH) BNP: N/A     INR:  N/A PTT:  N/A   D-dimer:  <0.27 Fibrinogen:  N/A       Imaging results reviewed over the past 24 hrs:   Recent Results (from the past 24 hour(s))   Chest XR,  PA & LAT    Narrative    EXAM: XR CHEST 2 VIEWS  LOCATION: [facility]  DATE: 12/15/2023    INDICATION: chest pain, shortness of breath  COMPARISON: None.      Impression    IMPRESSION: Heart size and pulmonary vascularity normal. Minimal atelectasis left base, lungs otherwise clear.

## 2023-12-16 NOTE — ED PROVIDER NOTES
History     Chief Complaint:  Chest Pain       The history is provided by the patient. The history is limited by a language barrier (Mandarin speaking). A  was used (Formal ).      Drew Bradford is a 63 year old male with a history of hypertension who presents with who presents with ongoing chest pain and shortness of breath since approximately 20 days ago. The pain is intermittent with each episode lasting about 5 to 6 minutes, but he had 2 episodes a couple days ago that lasted longer than usual. Also, he would initially only notice the pain when working, but now notices without movement. Furthermore, the pain make sit He does not have a history of heart disease, hyperlipidemia, diabetes mellitus, or smoking. Pertinent family history includes his mother who has coronary artery disease. He is not on blood thinners. He denies leg swelling or hemoptysis.     Independent Historian:   None - Patient Only    Review of External Notes:   Urgent Care 12/15/23     Medications:    Norvasc   Flomax     Past Medical History:    HTN   HLD   Presbyopia   AWA     Past Surgical History:    EDG w/ bx   Kidney stone removal   Cholecystectomy     Physical Exam   Patient Vitals for the past 24 hrs:   BP Temp Temp src Pulse Resp SpO2   12/15/23 2012 (!) 129/104 -- -- 67 15 99 %   12/15/23 1812 (!) 147/83 97.6  F (36.4  C) Temporal 61 20 99 %        Physical Exam  Eyes:  The pupils are equal and round    Conjunctivae and sclerae are normal  ENT:    The nose is normal    Pinnae are normal  CV:  Regular rate and rhythm     No edema  Resp:  Lungs are clear    Non-labored    No rales    No wheezing   GI:  Abdomen is soft, there is no rigidity    No distension    No rebound tenderness   MS:  Normal muscular tone    No asymmetric leg swelling  Skin:  No rash or acute skin lesions noted  Neuro:   Awake, alert.      Speech is normal and fluent.    Face is symmetric.     Moves all extremities      Emergency  Department Course   ECG  ECG taken at 1808, ECG read at 1808  Normal sinus rhythm   Anterior infarct, age undetermined   Abnormal ECG   Rate 61 bpm. WI interval 194 ms. QRS duration 100 ms. QT/QTc 374/376 ms. P-R-T axes 59 -11 64.     Imaging:  Chest XR,  PA & LAT  Final Result  IMPRESSION: Heart size and pulmonary vascularity normal. Minimal atelectasis left base, lungs otherwise clear.     Results per radiology     Laboratory:  Labs Ordered and Resulted from Time of ED Arrival to Time of ED Departure   BASIC METABOLIC PANEL - Abnormal       Result Value    Sodium 139      Potassium 4.6      Chloride 103      Carbon Dioxide (CO2) 27      Anion Gap 9      Urea Nitrogen 13.0      Creatinine 0.91      GFR Estimate >90      Calcium 8.9      Glucose 105 (*)    TROPONIN T, HIGH SENSITIVITY - Abnormal    Troponin T, High Sensitivity 273 (*)    D DIMER QUANTITATIVE - Normal    D-Dimer Quantitative <0.27     CBC WITH PLATELETS AND DIFFERENTIAL    WBC Count 8.5      RBC Count 4.98      Hemoglobin 15.0      Hematocrit 44.7      MCV 90      MCH 30.1      MCHC 33.6      RDW 11.7      Platelet Count 170      % Neutrophils 70      % Lymphocytes 19      % Monocytes 7      % Eosinophils 3      % Basophils 1      % Immature Granulocytes 0      NRBCs per 100 WBC 0      Absolute Neutrophils 5.9      Absolute Lymphocytes 1.6      Absolute Monocytes 0.6      Absolute Eosinophils 0.3      Absolute Basophils 0.1      Absolute Immature Granulocytes 0.0      Absolute NRBCs 0.0     CBC WITH PLATELETS   TROPONIN T, HIGH SENSITIVITY      Emergency Department Course & Assessments:    Interventions:  Medications   heparin infusion 25,000 units in D5W 250 mL ANTICOAGULANT (1,050 Units/hr Intravenous $New Bag 12/15/23 2009)   aspirin (ASA) chewable tablet 324 mg (324 mg Oral $Given 12/15/23 2008)   heparin loading dose for LOW INTENSITY TREATMENT * Give BEFORE starting heparin infusion (5,200 Units Intravenous $Given 12/15/23 2009)       Assessments:  1810 I obtained history in Triage and examined the patient as noted above.  1942 I rechecked the patient and explained findings. I discussed plan for admission to the hospital.    Independent Interpretation (X-rays, CTs, rhythm strip):  CXR: No pneumothorax or pneumonia    Consultations/Discussion of Management or Tests:  2021 I spoke with Minerva Burgos for Dr. Summers from [facility], who accepts the patient for admission.    Social Determinants of Health affecting care:   Language barrier    Disposition:  The patient was admitted to the hospital under the care of Dr. Summers.     Impression & Plan    Medical Decision Making:  Drew Bradford is a 63 year old male with a history of high blood pressure who presents with chest pain has been happening in him intermittently for the past 20 days.  Has become more regular and happening at rest now.  He was directed here to the emergency department for further evaluation.  EKG shows nonspecific findings.  No evidence of a STEMI.  He is pain-free.   he has associated shortness of breath when he has the pain, so D-dimer was obtained and negative.  His oxygenation is normal.  Troponin returned elevated worrisome for an NSTEMI.  He is pain-free here.  He is given aspirin and heparin.  Plan for admission.  Repeat troponin ordered.  Discussed with hospitalist team we will plan to admit the patient to a monitored bed.      Diagnosis:    ICD-10-CM    1. NSTEMI (non-ST elevated myocardial infarction) (H)  I21.4            Scribe Disclosure:  I, Victor Manuel Leger, am serving as a scribe at 7:45 PM on 12/15/2023 to document services personally performed by Hung Zambrano MD based on my observations and the provider's statements to me.   12/15/2023   Hung Zambrano MD [place], Aaron Joseph, MD  12/15/23 2029

## 2023-12-16 NOTE — CARE PLAN
RECEIVING UNIT ED HANDOFF REVIEW    ED Nurse Handoff Report was reviewed by: Douglas Pettit RN on December 15, 2023 at 9:40 PM

## 2023-12-16 NOTE — PRE-PROCEDURE
GENERAL PRE-PROCEDURE:     Risks and benefits: Risks, benefits and alternatives were discussed    Consent given by:  Patient  Patient states understanding of procedure being performed: Yes    Patient's understanding of procedure matches consent: Yes    Procedure consent matches procedure scheduled: Yes    Appropriately NPO:  Yes  ASA Class:  2  Mallampati  :  Grade 2- soft palate, base of uvula, tonsillar pillars, and portion of posterior pharyngeal wall visible  Lungs:  Lungs clear with good breath sounds bilaterally  Heart:  Normal heart sounds and rate  History & Physical reviewed:  History and physical reviewed and no updates needed  Statement of review:  I have reviewed the lab findings, diagnostic data, medications, and the plan for sedation

## 2023-12-17 ENCOUNTER — APPOINTMENT (OUTPATIENT)
Dept: ULTRASOUND IMAGING | Facility: CLINIC | Age: 63
End: 2023-12-17
Attending: PHYSICIAN ASSISTANT
Payer: COMMERCIAL

## 2023-12-17 ENCOUNTER — APPOINTMENT (OUTPATIENT)
Dept: CT IMAGING | Facility: CLINIC | Age: 63
End: 2023-12-17
Attending: PHYSICIAN ASSISTANT
Payer: COMMERCIAL

## 2023-12-17 LAB
ANION GAP SERPL CALCULATED.3IONS-SCNC: 8 MMOL/L (ref 7–15)
BUN SERPL-MCNC: 15.9 MG/DL (ref 8–23)
CALCIUM SERPL-MCNC: 8.7 MG/DL (ref 8.8–10.2)
CHLORIDE SERPL-SCNC: 107 MMOL/L (ref 98–107)
CREAT SERPL-MCNC: 0.92 MG/DL (ref 0.67–1.17)
DEPRECATED HCO3 PLAS-SCNC: 25 MMOL/L (ref 22–29)
EGFRCR SERPLBLD CKD-EPI 2021: >90 ML/MIN/1.73M2
GLUCOSE SERPL-MCNC: 104 MG/DL (ref 70–99)
POTASSIUM SERPL-SCNC: 4 MMOL/L (ref 3.4–5.3)
SODIUM SERPL-SCNC: 140 MMOL/L (ref 135–145)
UFH PPP CHRO-ACNC: 0.17 IU/ML
UFH PPP CHRO-ACNC: 0.38 IU/ML
UFH PPP CHRO-ACNC: 0.39 IU/ML

## 2023-12-17 PROCEDURE — 93970 EXTREMITY STUDY: CPT

## 2023-12-17 PROCEDURE — 99233 SBSQ HOSP IP/OBS HIGH 50: CPT | Mod: 25 | Performed by: INTERNAL MEDICINE

## 2023-12-17 PROCEDURE — 93005 ELECTROCARDIOGRAM TRACING: CPT

## 2023-12-17 PROCEDURE — 36415 COLL VENOUS BLD VENIPUNCTURE: CPT | Performed by: INTERNAL MEDICINE

## 2023-12-17 PROCEDURE — 93931 UPPER EXTREMITY STUDY: CPT | Mod: LT

## 2023-12-17 PROCEDURE — 71250 CT THORAX DX C-: CPT

## 2023-12-17 PROCEDURE — 93880 EXTRACRANIAL BILAT STUDY: CPT

## 2023-12-17 PROCEDURE — 99232 SBSQ HOSP IP/OBS MODERATE 35: CPT | Performed by: INTERNAL MEDICINE

## 2023-12-17 PROCEDURE — 93010 ELECTROCARDIOGRAM REPORT: CPT | Performed by: INTERNAL MEDICINE

## 2023-12-17 PROCEDURE — 250N000013 HC RX MED GY IP 250 OP 250 PS 637: Performed by: PHYSICIAN ASSISTANT

## 2023-12-17 PROCEDURE — 85520 HEPARIN ASSAY: CPT | Performed by: INTERNAL MEDICINE

## 2023-12-17 PROCEDURE — 80048 BASIC METABOLIC PNL TOTAL CA: CPT | Performed by: INTERNAL MEDICINE

## 2023-12-17 PROCEDURE — 250N000011 HC RX IP 250 OP 636: Mod: JZ | Performed by: PHYSICIAN ASSISTANT

## 2023-12-17 PROCEDURE — 99222 1ST HOSP IP/OBS MODERATE 55: CPT | Mod: FS | Performed by: PHYSICIAN ASSISTANT

## 2023-12-17 PROCEDURE — 210N000002 HC R&B HEART CARE

## 2023-12-17 RX ORDER — LISINOPRIL 2.5 MG/1
2.5 TABLET ORAL DAILY
Status: DISCONTINUED | OUTPATIENT
Start: 2023-12-18 | End: 2023-12-20

## 2023-12-17 RX ORDER — AMLODIPINE BESYLATE 5 MG/1
5 TABLET ORAL DAILY
Status: DISCONTINUED | OUTPATIENT
Start: 2023-12-18 | End: 2023-12-19

## 2023-12-17 RX ADMIN — HEPARIN SODIUM 1000 UNITS/HR: 10000 INJECTION, SOLUTION INTRAVENOUS at 20:09

## 2023-12-17 RX ADMIN — METOPROLOL TARTRATE 12.5 MG: 25 TABLET, FILM COATED ORAL at 20:10

## 2023-12-17 RX ADMIN — DOCUSATE SODIUM 50 MG AND SENNOSIDES 8.6 MG 1 TABLET: 8.6; 5 TABLET, FILM COATED ORAL at 07:54

## 2023-12-17 RX ADMIN — AMLODIPINE BESYLATE 10 MG: 10 TABLET ORAL at 08:00

## 2023-12-17 RX ADMIN — ASPIRIN 81 MG CHEWABLE TABLET 81 MG: 81 TABLET CHEWABLE at 08:00

## 2023-12-17 RX ADMIN — ATORVASTATIN CALCIUM 40 MG: 40 TABLET, FILM COATED ORAL at 20:10

## 2023-12-17 RX ADMIN — METOPROLOL TARTRATE 12.5 MG: 25 TABLET, FILM COATED ORAL at 07:54

## 2023-12-17 ASSESSMENT — ACTIVITIES OF DAILY LIVING (ADL)
ADLS_ACUITY_SCORE: 39
ADLS_ACUITY_SCORE: 39
ADLS_ACUITY_SCORE: 35
ADLS_ACUITY_SCORE: 39
ADLS_ACUITY_SCORE: 35
ADLS_ACUITY_SCORE: 39
ADLS_ACUITY_SCORE: 35
ADLS_ACUITY_SCORE: 35
ADLS_ACUITY_SCORE: 39

## 2023-12-17 NOTE — PROGRESS NOTES
"[facility] Progress Note     Jayy Gloria MD  12/17/2023         Interval History:      No chest pain or shortness of breath.       Assessment and Plan:      Non-ST elevation myocardial infarction.  Was found to have three-vessel disease including ostial LAD disease.  Discussed with interventional, recommend bypass surgery.  Patient seen by CV surgery today plan for bypass surgery next week.  Asymptomatic medically stable.  Mild ischemic cardiomyopathy with LVEF around 45%.  Clinically not in congestive heart failure.  Recommend adding lisinopril 2.5 mg daily.  To following up blood pressure will decrease amlodipine to 5 mg daily.  Continue low-dose metoprolol.  Dyslipidemia, , started on Lipitor 40 mg daily.  Hypertension controlled on amlodipine and low-dose metoprolol.  As noted above we will decrease amlodipine to allow lisinopril addition.    Recommendations  Continue aspirin, heparin, Lipitor, metoprolol  Decrease amlodipine to 5 mg daily  Add lisinopril 2.5 mg daily  Appreciate CV surgery assistance.  Plan is CABG next week.  Timing to be decided by CV surgery.  Given presentation with MI and significant severe tight ostial LAD disease would recommend revascularization this admission.       Physical Exam:       , Blood pressure 115/79, pulse 58, temperature 97.8  F (36.6  C), temperature source Oral, resp. rate 16, height 1.75 m (5' 8.9\"), weight 83.1 kg (183 lb 4.8 oz), SpO2 99%.  Vitals:    12/15/23 2225 12/16/23 0600 12/17/23 0614   Weight: 85.9 kg (189 lb 6 oz) 83.5 kg (184 lb) 83.1 kg (183 lb 4.8 oz)     Vital Signs with Ranges  Temp:  [97.8  F (36.6  C)-98.3  F (36.8  C)] 97.8  F (36.6  C)  Pulse:  [58-71] 58  Resp:  [16-18] 16  BP: (106-123)/(64-82) 115/79  SpO2:  [92 %-100 %] 99 %  I/O's Last 24 hours  I/O last 3 completed shifts:  In: 568.72 [P.O.:300; I.V.:268.72]  Out: -   General patient appears comfortable  Neck normal JVP  Cardiac system S1-S2 normal no murmur rub or gallop  Respiratory " "clear to auscultation             Medications:         [START ON 12/18/2023] amLODIPine  5 mg Oral Daily    aspirin  81 mg Oral Daily    atorvastatin  40 mg Oral QPM    [START ON 12/18/2023] lisinopril  2.5 mg Oral Daily    metoprolol tartrate  12.5 mg Oral BID    senna-docusate  1 tablet Oral BID    Or    senna-docusate  2 tablet Oral BID    sodium chloride (PF)  3 mL Intracatheter Q8H     PRN Meds: acetaminophen **OR** acetaminophen, calcium carbonate, HOLD MEDICATION, HOLD MEDICATION, hydrALAZINE **OR** hydrALAZINE, LORazepam **OR** LORazepam, - MEDICATION INSTRUCTIONS -, morphine, naloxone **OR** naloxone **OR** naloxone **OR** naloxone, nitroGLYcerin, ondansetron **OR** ondansetron, oxyCODONE **OR** oxyCODONE, - MEDICATION INSTRUCTIONS -, polyethylene glycol, prochlorperazine **OR** prochlorperazine **OR** prochlorperazine, ACE/ARB/ARNI NOT PRESCRIBED, senna-docusate **OR** senna-docusate, sodium chloride (PF)         Data:      All new lab and imaging data was reviewed.   Recent Labs   Lab Test 12/16/23  0636 12/15/23  1823 08/18/23  1054   WBC 6.6 8.5 7.9   HGB 14.6 15.0 15.5   MCV 89 90 89    170 185      Recent Labs   Lab Test 12/17/23  0408 12/16/23  0636 12/15/23  1823    141 139   POTASSIUM 4.0 3.9 4.6   CHLORIDE 107 108* 103   CO2 25 26 27   BUN 15.9 10.1 13.0   CR 0.92 0.82 0.91   ANIONGAP 8 7 9   STEPHEN 8.7* 8.6* 8.9   * 109* 105*     No lab results found.    Invalid input(s): \"TROP\", \"TROPONINIES\"     Jayy Gloria MD  12/17/2023  Pager:  [phone]    "

## 2023-12-17 NOTE — CARE PLAN
Patient alert, oriented. Went for angio today - no intervention. TR band on right wrist removed without issue. Patient communicating with staff via wife (some english), Google translate adrian, and J[employer]. Heparin gtt to resume at 2130 - which is 4 hours post TR band removal. Repeat hep xa scheduled for 0330. Lungs clear, patient on RA.

## 2023-12-17 NOTE — PROGRESS NOTES
[facility]    Hospitalist Progress Note    Assessment & Plan   Drew Bradford is a 63 year old Mandarin-speaking male with PMHx of hypertension and AWA on CPAP who was admitted on 12/15/2023 with an NSTEMI    NSTEMI  Hypertension  *Home meds: amlodipine 10mg daily  *Presented to ED with intermittent chest pain over the past 3 weeks which, was initially only with work and exerting himself but subsequently progressed to occurring at rest. Was seen in urgent care 2 days prior to admission and recommended to go to the ED but he declined. He presented to urgent care day of admission and had T wave inversions in V1/V2 with reports of cp/sob so he was sent to the ED for evaluation.   *In ED, was afebrile and VSS. Repeat EKG showed in ED showed resolution of T wave inversion in V2. Trops elevated (273--289--345). D-dimer nl. CXR neg. Given ASA, started on a heparin gtt. Cardiology consulted.   *Echo showed mildly reduced EF at 45-50% and areas of WMAs; nl RV size/function.  *FLP with total cholest 186, , HDL 43, TGs 110.   *Underwent coronary angiogram on 12/16/2023, was found to have severe multivessel CAD with 95% ostial stenosis of the LAD, 90% mid-vessel stenosis of a large bifurcating OM2, and subtotal 99% serial stenoses of the RPDA. CV surgery was consulted for evaluation of CABG  -- cont heparin gtt  -- cont ASA 81mg daily  -- cont atorvastatin 40mg daily  -- cont metoprolol 12.5mg BID (added this stay)  -- presently continues on amlodipine, anticipate transition to ACEI this stay given reduced EF  -- CV surgery following, anticipate eventual CABG this stay, preoperative workup ongoing, timing of surgery still to be determined    AWA on CPAP  *Chronic and stable on nocturnal CPAP     FEN: no IVFs, lytes stable, cardiac diet  DVT Prophylaxis: heparin gtt  Code Status: Full Code    Clinically Significant Risk Factors                  # Hypertension: Noted on problem list        #  "Overweight: Estimated body mass index is 27.15 kg/m  as calculated from the following:    Height as of this encounter: 1.75 m (5' 8.9\").    Weight as of this encounter: 83.1 kg (183 lb 4.8 oz)., PRESENT ON ADMISSION            Disposition: Anticipate CABG this stay, timing still to be determined.  Discharge pending postoperative course.    Spouse at bedside, questions answered.     Marissa Holbrook, DO    Medical Decision Making       Please see A&P for additional details of medical decision making.       Interval History   Chart reviewed. Seen this this morning with phone .  Spouse at bedside.  Patient resting comfortably.  Denies chest pain.  Mentions some increased feelings of fatigue after he has been up for a while.  No shortness of breath or cough.  No peripheral edema.  Had a chance to talk with CV surgery earlier this morning.  Currently undergoing preoperative testing.     -Data reviewed today: I reviewed all new labs and imaging results over the last 24 hours. I personally reviewed no images or EKG's today.    Physical Exam   Temp: 97.8  F (36.6  C) Temp src: Oral BP: 115/79 Pulse: 58   Resp: 16 SpO2: 99 % O2 Device: None (Room air)    Vitals:    12/15/23 2225 12/16/23 0600 12/17/23 0614   Weight: 85.9 kg (189 lb 6 oz) 83.5 kg (184 lb) 83.1 kg (183 lb 4.8 oz)     Vital Signs with Ranges  Temp:  [97.8  F (36.6  C)-98.3  F (36.8  C)] 97.8  F (36.6  C)  Pulse:  [58-71] 58  Resp:  [16-18] 16  BP: (106-123)/(64-82) 115/79  SpO2:  [92 %-100 %] 99 %  I/O last 3 completed shifts:  In: 568.72 [P.O.:300; I.V.:268.72]  Out: -     Constitutional: Resting comfortably, alert, NAD  Respiratory: CTAB, no wheeze, no increased work of breathing  Cardiovascular: HRRR, no MGR, no LE edema  GI: S, NT, ND, +BS  Skin/Integumen: warm/dry  Other:      Medications    heparin 1,000 Units/hr (12/17/23 1118)    - MEDICATION INSTRUCTIONS -      - MEDICATION INSTRUCTIONS -      ACE/ARB/ARNI NOT PRESCRIBED        " amLODIPine  10 mg Oral Daily    aspirin  81 mg Oral Daily    atorvastatin  40 mg Oral QPM    metoprolol tartrate  12.5 mg Oral BID    senna-docusate  1 tablet Oral BID    Or    senna-docusate  2 tablet Oral BID    sodium chloride (PF)  3 mL Intracatheter Q8H       Data   Recent Labs   Lab 12/17/23  0408 12/16/23  0636 12/15/23  1823   WBC  --  6.6 8.5   HGB  --  14.6 15.0   MCV  --  89 90   PLT  --  155 170    141 139   POTASSIUM 4.0 3.9 4.6   CHLORIDE 107 108* 103   CO2 25 26 27   BUN 15.9 10.1 13.0   CR 0.92 0.82 0.91   ANIONGAP 8 7 9   STEPHEN 8.7* 8.6* 8.9   * 109* 105*   ALBUMIN  --  3.9  --    PROTTOTAL  --  6.5  --    BILITOTAL  --  0.9  --    ALKPHOS  --  46  --    ALT  --  46  --    AST  --  40  --        Recent Results (from the past 24 hour(s))   Cardiac Catheterization    Narrative      Mid RCA lesion is 45% stenosed.    Acute Mrg lesion is 90% stenosed.    RPDA-1 lesion is 99% stenosed.    RPDA-2 lesion is 99% stenosed.    Ost LAD to Prox LAD lesion is 95% stenosed.    2nd Mrg lesion is 90% stenosed.    Severe multi-vessel coronary artery disease with 95% ostial stenosis of   the LAD, 90% mid-vessel stenosis of a large bifurcating OM2, and subtotal   99% serial stenoses of the RPDA.   Uncomplicated right radial access.

## 2023-12-17 NOTE — CONSULTS
CARDIOTHORACIC SURGERY CONSULT NOTE  12/17/2023      Reason for Consult: NSTEMI and multivessel coronary artery disease      ASSESSMENT/PLAN:   Talked with pt through ipad with .    Drew Bradford is a 63 year old male who presented with intermittent chest pain over the past 3 weeks which initially occurred only with work and exerting himself. However now has progressed to rest. Patient was seen in urgent care 2 days PTA and recommended to go to the ED but he declined. He presented to urgent care day of admission and had T wave inversions and chest pain or shortness of breath and sent to the ED.  He denies any radiation, associated lightheadedness, dizziness, sweating, or palpitations. This pressure lasted 5 to 6 minutes however he has had some episodes that have lasted longer. No personal history of cardiac issues, he takes amlodipine. Initial EKG at urgent care with T wave inversions in V1 and V2, and repeated in ER with resolution of inversion in V2. Troponin 273--> 289. Ddimer within normal limits. CXR minimal atelectasis to left base. Pt was admitted to the hospital.    Cardiology was consulted and had a coronary angiogram done on 12/16/23 which showed severe multivessel coronary artery disease. Pt has an ostial LAD to prox LAD lesion of 95% and PDA with 99%. Also had an echocardiogram EF 45-50%. No regional WMA. Noted to have trace mitral regurg and tricuspid regurg.       Pt has a past medical history of hypertension, hyperlipidemia and AWA presented with NSTEMI.     - Recommend CABG. Will review with surgeon timing/plan.  - continue heparin gtt, lopressor, atorvastatin and aspirin  - Will obtain vein mapping, R upper extremity US, bilateral carotid duplex US, CT chest w/o contrast  - Other cares per primary team  - will revisit pt tomorrow to discuss plans for surgery including timing  - Thank you for the opportunity to participate in the care of this patient.    STS Risk Score for     Procedure Type:  Isolated CABG  Perioperative Outcome Estimate %  Operative Mortality 0.618%  Morbidity & Mortality 4.58%  Stroke 1.06%  Renal Failure 0.5%  Reoperation 2.98%  Prolonged Ventilation 2.29%  Deep Sternal Wound Infection 0.09%  Long Hospital Stay (>14 days) 1.76%  Short Hospital Stay (<6 days) 64.6%      Patient and plan discussed with attending, Dr Avelar.      Harvey Lewis PA-C  Cardiothoracic Surgery  Pager [phone]        ________________________________________________________________________________________________    He otherwise denies any chest pain, SOB, lightheadedness, dizziness, palpitations, LE edema.     PMH:  Past Medical History:   Diagnosis Date    HTN (hypertension)     Hyperlipidemia LDL goal <100     Nocturia     Presbyopia     Seasonal allergies        PSH:  Past Surgical History:   Procedure Laterality Date    COLONOSCOPY N/A 7/16/2018    Procedure: COLONOSCOPY;  colonoscopy;  Surgeon: Geremias Strickland MD;  Location: [facility]    ESOPHAGOSCOPY, GASTROSCOPY, DUODENOSCOPY (EGD), COMBINED N/A 1/14/2020    Procedure: ESOPHAGOGASTRODUODENOSCOPY, WITH BIOPSY;  Surgeon: Tez Vences MD;  Location: [facility]    ZZC REMOVAL OF KIDNEY STONE         FH:  Family History   Problem Relation Age of Onset    No Known Problems Mother     No Known Problems Father        SH:  Social History     Socioeconomic History    Marital status:    Tobacco Use    Smoking status: Former    Smokeless tobacco: Never    Tobacco comments:     Quit 20 years ago   Vaping Use    Vaping Use: Never used   Substance and Sexual Activity    Alcohol use: Yes     Alcohol/week: 2.0 standard drinks of alcohol     Types: 2 Cans of beer per week     Comment: 2 cans of beer a week    Drug use: Never    Sexual activity: Yes     Partners: Female   Social History Narrative    ** Merged History Encounter **            Home Meds:  Medications Prior to Admission   Medication Sig Dispense Refill Last Dose    amLODIPine (NORVASC) 10 MG tablet  Take 1 tablet (10 mg) by mouth daily Take 10 mg by mouth daily 90 tablet 1 12/15/2023 at am     Allergies:  Allergies   Allergen Reactions    Cephalosporins Anaphylaxis    Penicillin G Anaphylaxis    Amoxicillin Cramps     Kidney problem    Fish Oil Hives    Shrimp Hives    Penicillins     Amoxicillin Rash     Back pain, kidney pain.     Misc. Sulfonamide Containing Compounds      ROS: 10 point ROS neg other than the symptoms noted above in the HPI.      Physical Exam:  Temp:  [97.8  F (36.6  C)-98.3  F (36.8  C)] 97.8  F (36.6  C)  Pulse:  [61-71] 63  Resp:  [16-18] 16  BP: (106-123)/(64-82) 122/82  SpO2:  [92 %-100 %] 100 %  Gen: NAD, resting in bed  CV: RRR, S1S2 normal, no murmurs, rubs, or gallops. JVP not elevated  Pulm: clear to auscultation bilaterally, no rhonchi or wheezes  Abd: soft, non-tender, no guarding, +BM  Ext: no lower extremity edema  Neuro: grossly normal  Psych: calm, cooperative       Labs:  ABG No lab results found in last 7 days.  CBC  Recent Labs   Lab 23  0636 12/15/23  1823   WBC 6.6 8.5   HGB 14.6 15.0    170     BMP  Recent Labs   Lab 23  0408 23  0636 12/15/23  1823    141 139   POTASSIUM 4.0 3.9 4.6   CHLORIDE 107 108* 103   CO2 25 26 27   BUN 15.9 10.1 13.0   CR 0.92 0.82 0.91   * 109* 105*     LFT  Recent Labs   Lab 23  0636   AST 40   ALT 46   ALKPHOS 46   BILITOTAL 0.9   ALBUMIN 3.9     PancreasNo lab results found in last 7 days.    Imaging:  Recent Results (from the past 24 hour(s))   Echocardiogram Complete   Result Value    LVEF  45-50%    Narrative    [id number]  [id number]  [id number]  [id number]^MARY^CRISTIANE^PAPITO     [facility]  Echocardiography Laboratory  [facility] [place] [address]     Name: ADEOLA FOX  MRN: [id number]  : 1960  Study Date: 2023 10:46 AM  Age: 63 yrs  Gender: Male  Patient [facility]  Reason For Study: Chest Pain, Chest Tightness, Chest Pressure  Ordering  Physician: CRISTIANE HERNANDEZ  Referring Physician: DENISHA SOARES  Performed By: Maria Victoria Herndon     BSA: 2.0 m2  Height: 68 in  Weight: 184 lb  HR: 59  BP: 116/77 mmHg  ______________________________________________________________________________  Procedure  Complete Echo Adult.  ______________________________________________________________________________  Interpretation Summary     The left ventricle is normal in size.  Left ventricular systolic function is mildly reduced.  The visual ejection fraction is 45-50%.  There are regional wall motion abnormalities as specified.  The right ventricle is normal size.  The right ventricular systolic function is normal.  Normal left atrial size.  The inferior vena cava was normal in size with preserved respiratory  variability.  ______________________________________________________________________________  Left Ventricle  The left ventricle is normal in size. Left ventricular systolic function is  mildly reduced. The visual ejection fraction is 45-50%. There is apical,  anterior (midâ  distal), anteroseptal (midâ  distal) and inferoseptal (midâ  distal)  hypokinesis. There are regional wall motion abnormalities as specified.     Right Ventricle  The right ventricle is normal size. The right ventricular systolic function is  normal.     Atria  Normal left atrial size. Right atrial size is normal.     Mitral Valve  The mitral valve leaflets appear normal. There is no evidence of stenosis,  fluttering, or prolapse. There is trace mitral regurgitation.     Tricuspid Valve  Normal tricuspid valve. The right ventricular systolic pressure is  approximated at 22mmHg plus the right atrial pressure. There is trace  tricuspid regurgitation.     Aortic Valve  The aortic valve is trileaflet. No aortic regurgitation is present.     Pulmonic Valve  The pulmonic valve is not well visualized.     Vessels  Normal size aorta. Normal size ascending aorta. The inferior vena cava  was  normal in size with preserved respiratory variability.     Pericardium  There is no pericardial effusion.     Rhythm  Sinus rhythm was noted.  ______________________________________________________________________________  MMode/2D Measurements & Calculations  IVSd: 0.99 cm     LVIDd: 4.7 cm  LVIDs: 3.3 cm  LVPWd: 0.88 cm  FS: 30.4 %  LV mass(C)d: 148.7 grams  LV mass(C)dI: 75.4 grams/m2  Ao root diam: 3.5 cm  asc Aorta Diam: 3.7 cm  LVOT diam: 2.0 cm  LVOT area: 3.1 cm2  Ao root diam index Ht(cm/m): 2.0  Ao root diam index BSA (cm/m2): 1.8  Asc Ao diam index BSA (cm/m2): 1.9  Asc Ao diam index Ht(cm/m): 2.1  LA Volume (BP): 29.5 ml     LA Volume Index (BP): 15.0 ml/m2  RWT: 0.38  TAPSE: 2.6 cm     Doppler Measurements & Calculations  MV E max richard: 78.1 cm/sec  MV A max richard: 95.9 cm/sec  MV E/A: 0.81  MV dec time: 0.23 sec  Ao V2 max: 145.0 cm/sec  Ao max P.0 mmHg  Ao V2 mean: 98.1 cm/sec  Ao mean P.0 mmHg  Ao V2 VTI: 31.7 cm  ANJEL(I,D): 2.2 cm2  ANJEL(V,D): 2.1 cm2  LV V1 max PG: 3.8 mmHg  LV V1 max: 98.0 cm/sec  LV V1 VTI: 22.3 cm  SV(LVOT): 70.1 ml  SI(LVOT): 35.5 ml/m2  PA V2 max: 111.0 cm/sec  PA max P.9 mmHg  PA acc time: 0.10 sec  AV Richard Ratio (DI): 0.68  ANJEL Index (cm2/m2): 1.1  E/E' avg: 10.8  Lateral E/e': 9.6     Medial E/e': 12.1  RV S Richard: 14.5 cm/sec     ______________________________________________________________________________  Report approved by: Richie Rivera 2023 12:28 PM         Cardiac Catheterization    Narrative      Mid RCA lesion is 45% stenosed.    Acute Mrg lesion is 90% stenosed.    RPDA-1 lesion is 99% stenosed.    RPDA-2 lesion is 99% stenosed.    Ost LAD to Prox LAD lesion is 95% stenosed.    2nd Mrg lesion is 90% stenosed.    Severe multi-vessel coronary artery disease with 95% ostial stenosis of   the LAD, 90% mid-vessel stenosis of a large bifurcating OM2, and subtotal   99% serial stenoses of the RPDA.   Uncomplicated right radial access.

## 2023-12-17 NOTE — PLAN OF CARE
Problem: Adult Inpatient Plan of Care  Goal: Plan of Care Review  Description: The Plan of Care Review/Shift note should be completed every shift.  The Outcome Evaluation is a brief statement about your assessment that the patient is improving, declining, or no change.  This information will be displayed automatically on your shift  note.  Outcome: Progressing  Flowsheets (Taken 12/17/2023 1326)  Plan of Care Reviewed With:   patient   spouse  Overall Patient Progress: no change  Goal: Absence of Hospital-Acquired Illness or Injury  Intervention: Identify and Manage Fall Risk  Recent Flowsheet Documentation  Taken 12/17/2023 0800 by Karen Vegas RN  Safety Promotion/Fall Prevention:   clutter free environment maintained   nonskid shoes/slippers when out of bed   patient and family education   safety round/check completed   room organization consistent   room door open  Intervention: Prevent Skin Injury  Recent Flowsheet Documentation  Taken 12/17/2023 0800 by Karen Vegas RN  Body Position: position changed independently  Intervention: Prevent and Manage VTE (Venous Thromboembolism) Risk  Recent Flowsheet Documentation  Taken 12/17/2023 0800 by Karen Vegas RN  VTE Prevention/Management: (walks frequently) SCDs (sequential compression devices) off  Intervention: Prevent Infection  Recent Flowsheet Documentation  Taken 12/17/2023 0800 by Karen Vegas RN  Infection Prevention:   environmental surveillance performed   hand hygiene promoted   rest/sleep promoted   single patient room provided  Goal: Optimal Comfort and Wellbeing  Intervention: Provide Person-Centered Care  Recent Flowsheet Documentation  Taken 12/17/2023 0800 by Karen Vegas RN  Trust Relationship/Rapport:   care explained   choices provided   Goal Outcome Evaluation:      Plan of Care Reviewed With: patient, spouse    Overall Patient Progress: no changeOverall Patient Progress: no change

## 2023-12-17 NOTE — PROGRESS NOTES
A&O x 4, calm and cooperative for cares. Mandarin speaking, some English, [employer] and [employer]  utilized. Up an independent. Right wrist soft, dressing CDI. No hematoma, CMS intact. Heparin infusing at 1000 units/hr Xa at 10;45 am. Waiting CABG consult. Continue plan of care.

## 2023-12-18 ENCOUNTER — PREP FOR PROCEDURE (OUTPATIENT)
Dept: CARDIOLOGY | Facility: CLINIC | Age: 63
End: 2023-12-18
Payer: COMMERCIAL

## 2023-12-18 DIAGNOSIS — I25.10 CAD (CORONARY ARTERY DISEASE): Primary | ICD-10-CM

## 2023-12-18 LAB
ALBUMIN UR-MCNC: NEGATIVE MG/DL
APPEARANCE UR: CLEAR
BILIRUB UR QL STRIP: NEGATIVE
BLD PROD TYP BPU: NORMAL
BLD PROD TYP BPU: NORMAL
BLOOD COMPONENT TYPE: NORMAL
BLOOD COMPONENT TYPE: NORMAL
CODING SYSTEM: NORMAL
CODING SYSTEM: NORMAL
COLOR UR AUTO: NORMAL
CROSSMATCH: NORMAL
CROSSMATCH: NORMAL
ERYTHROCYTE [DISTWIDTH] IN BLOOD BY AUTOMATED COUNT: 11.8 % (ref 10–15)
GLUCOSE UR STRIP-MCNC: NEGATIVE MG/DL
HBA1C MFR BLD: 5.6 %
HCT VFR BLD AUTO: 43.3 % (ref 40–53)
HGB BLD-MCNC: 14.8 G/DL (ref 13.3–17.7)
HGB UR QL STRIP: NEGATIVE
ISSUE DATE AND TIME: NORMAL
ISSUE DATE AND TIME: NORMAL
KETONES UR STRIP-MCNC: NEGATIVE MG/DL
LEUKOCYTE ESTERASE UR QL STRIP: NEGATIVE
MCH RBC QN AUTO: 30.4 PG (ref 26.5–33)
MCHC RBC AUTO-ENTMCNC: 34.2 G/DL (ref 31.5–36.5)
MCV RBC AUTO: 89 FL (ref 78–100)
NITRATE UR QL: NEGATIVE
PH UR STRIP: 7 [PH] (ref 5–7)
PLATELET # BLD AUTO: 162 10E3/UL (ref 150–450)
RBC # BLD AUTO: 4.87 10E6/UL (ref 4.4–5.9)
SP GR UR STRIP: 1.01 (ref 1–1.03)
UFH PPP CHRO-ACNC: 0.34 IU/ML
UNIT ABO/RH: NORMAL
UNIT ABO/RH: NORMAL
UNIT NUMBER: NORMAL
UNIT NUMBER: NORMAL
UNIT STATUS: NORMAL
UNIT STATUS: NORMAL
UNIT TYPE ISBT: 5100
UNIT TYPE ISBT: 5100
UROBILINOGEN UR STRIP-MCNC: NORMAL MG/DL
WBC # BLD AUTO: 7.3 10E3/UL (ref 4–11)

## 2023-12-18 PROCEDURE — 250N000013 HC RX MED GY IP 250 OP 250 PS 637: Performed by: NURSE PRACTITIONER

## 2023-12-18 PROCEDURE — 99232 SBSQ HOSP IP/OBS MODERATE 35: CPT | Performed by: INTERNAL MEDICINE

## 2023-12-18 PROCEDURE — 210N000002 HC R&B HEART CARE

## 2023-12-18 PROCEDURE — 83036 HEMOGLOBIN GLYCOSYLATED A1C: CPT | Performed by: STUDENT IN AN ORGANIZED HEALTH CARE EDUCATION/TRAINING PROGRAM

## 2023-12-18 PROCEDURE — 250N000013 HC RX MED GY IP 250 OP 250 PS 637: Performed by: INTERNAL MEDICINE

## 2023-12-18 PROCEDURE — 85027 COMPLETE CBC AUTOMATED: CPT | Performed by: PHYSICIAN ASSISTANT

## 2023-12-18 PROCEDURE — 250N000011 HC RX IP 250 OP 636: Mod: JZ | Performed by: PHYSICIAN ASSISTANT

## 2023-12-18 PROCEDURE — 81003 URINALYSIS AUTO W/O SCOPE: CPT | Performed by: STUDENT IN AN ORGANIZED HEALTH CARE EDUCATION/TRAINING PROGRAM

## 2023-12-18 PROCEDURE — 85520 HEPARIN ASSAY: CPT | Performed by: SURGERY

## 2023-12-18 PROCEDURE — 250N000013 HC RX MED GY IP 250 OP 250 PS 637: Performed by: PHYSICIAN ASSISTANT

## 2023-12-18 PROCEDURE — 99233 SBSQ HOSP IP/OBS HIGH 50: CPT | Mod: FS | Performed by: NURSE PRACTITIONER

## 2023-12-18 PROCEDURE — 36415 COLL VENOUS BLD VENIPUNCTURE: CPT | Performed by: SURGERY

## 2023-12-18 RX ORDER — LISINOPRIL 2.5 MG/1
2.5 TABLET ORAL DAILY
Status: CANCELLED | OUTPATIENT
Start: 2023-12-19

## 2023-12-18 RX ADMIN — METOPROLOL TARTRATE 12.5 MG: 25 TABLET, FILM COATED ORAL at 09:36

## 2023-12-18 RX ADMIN — ATORVASTATIN CALCIUM 40 MG: 40 TABLET, FILM COATED ORAL at 20:21

## 2023-12-18 RX ADMIN — ASPIRIN 81 MG CHEWABLE TABLET 81 MG: 81 TABLET CHEWABLE at 09:35

## 2023-12-18 RX ADMIN — HEPARIN SODIUM 1000 UNITS/HR: 10000 INJECTION, SOLUTION INTRAVENOUS at 18:34

## 2023-12-18 RX ADMIN — DOCUSATE SODIUM 50 MG AND SENNOSIDES 8.6 MG 1 TABLET: 8.6; 5 TABLET, FILM COATED ORAL at 20:21

## 2023-12-18 RX ADMIN — AMLODIPINE BESYLATE 5 MG: 5 TABLET ORAL at 09:35

## 2023-12-18 RX ADMIN — METOPROLOL TARTRATE 12.5 MG: 25 TABLET, FILM COATED ORAL at 20:21

## 2023-12-18 RX ADMIN — ISOSORBIDE MONONITRATE 15 MG: 30 TABLET, EXTENDED RELEASE ORAL at 20:21

## 2023-12-18 RX ADMIN — LISINOPRIL 2.5 MG: 2.5 TABLET ORAL at 09:36

## 2023-12-18 ASSESSMENT — ACTIVITIES OF DAILY LIVING (ADL)
ADLS_ACUITY_SCORE: 35
ADLS_ACUITY_SCORE: 18
ADLS_ACUITY_SCORE: 35
ADLS_ACUITY_SCORE: 35
ADLS_ACUITY_SCORE: 18
ADLS_ACUITY_SCORE: 18
ADLS_ACUITY_SCORE: 35

## 2023-12-18 NOTE — PROGRESS NOTES
[id number]     Aox4, calm cooperative. VSS on RA. Denies chest pain/ SOB. Heparin gtt infusing per order. Hep xa therapeutic x2, recheck in AM. Wife supportive at bedside.

## 2023-12-18 NOTE — PROGRESS NOTES
[facility]    Cardiology Progress Note    Primary Cardiologist: Dr. Gloria    Date of Admission: 12/15/2023  Service Date: 12/18/23    Summary:  Mr. Drew Bradford is a very pleasant 63 year old male with a past medical history of hypertension and dyslipidemia who was admitted on 12/15/2023 for chest pain. Cardiology was consulted for NSTEMI.    Interval History   Patient had episode of chest pain last night which occurred at rest.  EKG was done showing no acute ST-T wave changes.  He also had a fleeting episode of chest pain lasting seconds this morning occurring at rest.  No issues with right radial access site.    Telemetry: Sinus bradycardia, rate 50s    Assessment & Plan   1. NSTEMI  -Troponin 273 [phone]  -EKG showed T wave inversion concerning for Wellens sign in anterior precordial leads  -Coronary angiogram: Severe multivessel coronary artery disease with a 95% ostial stenosis of the LAD, 90% mid vessel stenosis of a large bifurcating OM 2, and subtotal 99% serial stenosis of the RPDA  -CV surgery consulted    2. Mild ischemic cardiomyopathy   - LVEF: 45 to 45%  - NYHA class II  - Fluid status: Euvolemic; suspected dry weight: ~184#  - Diuretic regimen: None  - Ischemic evaluation: Coronary angiogram as above  - Guideline directed medical therapy:  - Beta blocker: Metoprolol tartrate 12.5 mg twice per day  - ACEI/ARB/ARNI: Lisinopril 2.5 mg daily  - Aldactone antagonist: None  - SGLT2 inhibitor: None    3. Dyslipidemia  -12/17/2023   -Initiated on atorvastatin 40mg daily this admission    4. Hypertension, controlled  -On multidrug regimen    Plan:   1. Continue heparin, aspirin, atorvastatin, lisinopril, and metoprolol   2.  Appears well compensated from a heart failure standpoint  3. Further surgical planning per CV surgery team, will contact them regarding recurrent chest pain last night      Thank you for the opportunity to participate in this pleasant patient's care.      DIAMANTE Perez, CNP   Nurse Practitioner  [facility]  Pager: [phone]  (8am - 5pm, M-F)    Patient Active Problem List   Diagnosis    Hyperlipidemia LDL goal <100    AWA (obstructive sleep apnea)    Seasonal allergic rhinitis, unspecified trigger    Essential hypertension    NSTEMI (non-ST elevated myocardial infarction) (H)       Physical Exam   Temp: 97.6  F (36.4  C) Temp src: Oral BP: 118/87 Pulse: 63   Resp: 18 SpO2: 99 % O2 Device: None (Room air)    Vitals:    12/16/23 0600 12/17/23 0614 12/18/23 0436   Weight: 83.5 kg (184 lb) 83.1 kg (183 lb 4.8 oz) 83.6 kg (184 lb 6.4 oz)     Vital Signs with Ranges  Temp:  [97.6  F (36.4  C)-98.6  F (37  C)] 97.6  F (36.4  C)  Pulse:  [55-68] 63  Resp:  [16-18] 18  BP: (104-145)/(70-95) 118/87  SpO2:  [97 %-100 %] 99 %  I/O last 3 completed shifts:  In: 161.09 [I.V.:161.09]  Out: -     Constitutional:  Appears his stated age, well nourished, and in no acute distress.  Mandarin speaking  Eyes: Pupils equal, round. Sclerae anicteric.   HEENT: Normocephalic, atraumatic.   Neck: Supple.  Respiratory: Breathing non-labored. Lungs clear to auscultation bilaterally. No crackles, wheezes, rhonchi, or rales.  Cardiovascular: Regular rate and rhythm, normal S1 and S2. No murmur, rub, or gallop.  GI: Soft, non-distended, non-tender, bowel sounds present in all four quadrants.  Skin: Warm, dry. No rashes, cyanosis, edema, or xanthelasma.  Right radial access site with minimal bruising, positive CMS, nontender  Musculoskeletal/Extremities: Moves all extremities well and symmetrically. No edema.  Neurologic: No gross focal deficits. Alert, awake, and oriented to person, place and time.  Psychiatric: Affect appropriate. Mentation normal.    Medications    heparin 1,000 Units/hr (12/17/23 2009)    - MEDICATION INSTRUCTIONS -      - MEDICATION INSTRUCTIONS -      ACE/ARB/ARNI NOT PRESCRIBED        amLODIPine  5 mg Oral Daily    aspirin  81 mg Oral Daily     atorvastatin  40 mg Oral QPM    lisinopril  2.5 mg Oral Daily    metoprolol tartrate  12.5 mg Oral BID    senna-docusate  1 tablet Oral BID    Or    senna-docusate  2 tablet Oral BID    sodium chloride (PF)  3 mL Intracatheter Q8H       Data   Recent Results (from the past 24 hour(s))   US Carotid Bilateral    Narrative    EXAM: US CAROTID BILATERAL  LOCATION: [facility]  DATE: 12/17/2023    INDICATION: CABG preoperative assessment.  COMPARISON: None available.  TECHNIQUE: Duplex exam performed utilizing 2D gray-scale imaging, Doppler interrogation with color-flow and spectral waveform analysis. The percent diameter stenosis is determined using NASCET criteria and Society of Radiologists in Ultrasound Consensus   Criteria.    FINDINGS:    RIGHT: Mild plaque at the bifurcation. The peak systolic velocity in the ICA is less than 125 cm/sec, consistent with less than 50% stenosis. Normal velocities in the ECA. Antegrade flow within the vertebral artery.     LEFT: Mild plaque at the bifurcation. The peak systolic velocity in the ICA is less than 125 cm/sec, consistent with less than 50% stenosis. Normal velocities in the ECA. Antegrade flow within the vertebral artery.    VELOCITY CHART:  CCA   Right: 79 cm/s   Left: 69 cm/s  ICA   Right: 85 cm/s   Left: 76 cm/s  ECA   Right: 70 cm/s   Left: 73 cm/s  ICA/CCA PSV Ratio   Right: 1.1   Left: 1.1      Impression    IMPRESSION:  1.  Mild plaque formation, velocities consistent with less than 50% stenosis in the right internal carotid artery.  2.  Mild plaque formation, velocities consistent with less than 50% stenosis in the left internal carotid artery.  3.  Flow within the vertebral arteries is antegrade.   US Upper Extremity Arterial Duplex Left    Narrative    EXAM: US UPPER EXTREMITY ARTERIAL DUPLEX LEFT  LOCATION: [facility]  DATE: 12/17/2023    INDICATION: preop cabg, poss radial artery harvest  COMPARISON:  None.  TECHNIQUE: Arterial Duplex ultrasound of the left arm. Color flow and spectral Doppler with waveform analysis performed.    FINDINGS (LEFT upper extremity):    ARTERIAL PEAK SYSTOLIC VELOCITIES (cm/s):  Brachial (distal): 64 cm/s  Radial: 64 cm/s  Ulnar: 66 cm/s    Radial artery diameter:  Proximal: 2.1 mm  Proximal to mid: 2.1 mm  Mid: 2.3 mm  Mid to distal: 2.5 mm  Distal: 2.2 mm    WAVEFORMS/COLOR DOPPLER: Normal high resistance arterial waveforms within the visualized portions of the brachial, radial, and ulnar arteries.      Impression    IMPRESSION:   1.  Radial artery measurements as above.   US Lower Extremity Venous Mapping Bilateral    Narrative    EXAM: ULTRASOUND LOWER EXTREMITY VENOUS MAPPING BILATERAL  LOCATION: [facility]  DATE: 12/17/2023    INDICATION: Preop CABG.  COMPARISON: None.  TECHNIQUE: Ultrasound examination of the lower extremity veins was performed, including gray-scale and compression imaging.     LOWER  EXTREMITY FINDINGS:       VENOUS DIAMETERS  The right great saphenous vein ranges from 2.5 - 4.8 mm throughout the thigh and 2.3 - 4.3 mm at and below the level of the knee. At least 3 side branches noted in the proximal to mid thigh levels.    The left great saphenous vein ranges from 3.6 - 4.7 mm throughout the thigh and 2-2 0.9 mm below the level of the knee. At least two side branches noted in the thigh and additional two side branches near the level of the knee.      Impression    IMPRESSION: Bilateral greater saphenous vein measurements as described.       CT Chest w/o Contrast    Narrative    EXAM: CT CHEST W/O CONTRAST  LOCATION: [facility]  DATE: 12/17/2023    INDICATION: Preop CABG. Evaluate plaque on ascending aorta.  COMPARISON: None.  TECHNIQUE: CT chest without IV contrast. Multiplanar reformats were obtained. Dose reduction techniques were used.  CONTRAST: None.    FINDINGS:     LUNGS AND PLEURA: Mild scarring at  the lung apices. Solid 3 mm nodule in the lingula (4/#228). Small intrapulmonary lymph node along the left major fissure. Tiny punctate calcified left lower lobe granuloma. No consolidations or pleural effusions.    MEDIASTINUM/AXILLAE: No thoracic aortic aneurysm. No significant atherosclerotic calcifications on the ascending thoracic aorta. Minimal atherosclerotic calcifications at the aortic arch and in the descending thoracic aorta. Normal cardiac size. No   pericardial effusion. No enlarged thoracic lymph node.    CORONARY ARTERY CALCIFICATION: Moderate.    UPPER ABDOMEN: Small hepatic cysts, which do not require follow-up.    MUSCULOSKELETAL: Multilevel degenerative changes of the spine. No acute bony abnormality.      Impression    IMPRESSION:     1.  No significant atherosclerotic calcifications on the ascending thoracic aorta, as queried.    2.  Incidental solid 3 mm nodule in the lingula. See follow-up guidelines below.    REFERENCE:  Guidelines for Management of Incidental Pulmonary Nodules Detected on CT Images: From the Fleischner Society 2017.   Guidelines apply to incidental nodules in patients who are 35 years or older.  Guidelines do not apply to lung cancer screening, patients with immunosuppression, or patients with known primary cancer.    SINGLE NODULE  Nodule size <6 mm  Low-risk patients: No follow-up needed.  High-risk patients: Optional follow-up at 12 months.       Recent Labs   Lab 12/18/23  0705 12/16/23  0636 12/15/23  1823   WBC 7.3 6.6 8.5   HGB 14.8 14.6 15.0   HCT 43.3 43.8 44.7   MCV 89 89 90    155 170     Recent Labs   Lab 12/17/23  0408 12/16/23  0636 12/15/23  1823    141 139   POTASSIUM 4.0 3.9 4.6   CHLORIDE 107 108* 103   CO2 25 26 27   ANIONGAP 8 7 9   * 109* 105*   BUN 15.9 10.1 13.0   CR 0.92 0.82 0.91   GFRESTIMATED >90 >90 >90   STEPHEN 8.7* 8.6* 8.9        This note was completed in part using Dragon voice recognition software. Although reviewed after  completion, some word and grammatical errors may occur.

## 2023-12-18 NOTE — PROGRESS NOTES
CV Surgery Brief Progress Note    Course reviewed, had some intermittent chest pain overnight, no need for NTG thus far; remains on heparin drip.   Patient scheduled for surgery with Dr. Napoles on Wednesday 12/20 as a to follow case.  Will complete preoperative education tomorrow via .    DIAMANTE Gipson, ACNPC-AG, CCRN  Nurse Practitioner  Cardiothoracic Surgery  Pager: [phone]

## 2023-12-18 NOTE — PROGRESS NOTES
[facility]    Hospitalist Progress Note    Assessment & Plan   Drew Bradford is a 63 year old Mandarin-speaking male with PMHx of hypertension and AWA on CPAP who was admitted on 12/15/2023 with an NSTEMI    NSTEMI  Hypertension  *Home meds: amlodipine 10mg daily  *Presented to ED with intermittent chest pain over the past 3 weeks which, was initially only with work and exerting himself but subsequently progressed to occurring at rest. Was seen in urgent care 2 days prior to admission and recommended to go to the ED but he declined. He presented to urgent care day of admission and had T wave inversions in V1/V2 with reports of cp/sob so he was sent to the ED for evaluation.   *In ED, was afebrile and VSS. Repeat EKG showed in ED showed resolution of T wave inversion in V2. Trops elevated (273--289--345). D-dimer nl. CXR neg. Given ASA, started on a heparin gtt. Cardiology consulted.   *Echo showed mildly reduced EF at 45-50% and areas of WMAs; nl RV size/function.  *FLP with total cholest 186, , HDL 43, TGs 110.   *Underwent coronary angiogram on 12/16/2023, was found to have severe multivessel CAD with 95% ostial stenosis of the LAD, 90% mid-vessel stenosis of a large bifurcating OM2, and subtotal 99% serial stenoses of the RPDA. CV surgery was consulted for evaluation of CABG  -- cont heparin gtt  -- cont ASA 81mg daily, atorvastatin 40mg daily  -- cont metoprolol 12.5mg BID (added this stay)  -- presently continues on amlodipine, anticipate transition to ACEI this stay given reduced EF  -- CV surgery following, anticipate eventual CABG this stay, preoperative workup ongoing, timing of surgery still to be determined but will be this admission    AWA on CPAP  *Chronic and stable on nocturnal CPAP     FEN: no IVFs, lytes stable, cardiac diet  DVT Prophylaxis: heparin gtt  Code Status: Full Code    Clinically Significant Risk Factors                  # Hypertension: Noted on problem list  "       # Overweight: Estimated body mass index is 27.31 kg/m  as calculated from the following:    Height as of this encounter: 1.75 m (5' 8.9\").    Weight as of this encounter: 83.6 kg (184 lb 6.4 oz)., PRESENT ON ADMISSION            Disposition: Plan is for CABG this stay, timing still to be determined.  Discharge pending postoperative course.    Spouse at bedside, questions answered.     Marissa Holbrook, DO    Medical Decision Making       Please see A&P for additional details of medical decision making.       Interval History   Chart reviewed. Seen this this morning with phone .  Spouse at bedside.  Reports episode of chest pain overnight and again this morning. Both episodes resolved after a few minutes. No chest pain at present, denies sob/cough, abd pain/n/v. Wondering about timing of CABG.     -Data reviewed today: I reviewed all new labs and imaging results over the last 24 hours. I personally reviewed no images or EKG's today.    Physical Exam   Temp: 97.6  F (36.4  C) Temp src: Oral BP: 118/87 Pulse: 63   Resp: 18 SpO2: 99 % O2 Device: None (Room air)    Vitals:    12/16/23 0600 12/17/23 0614 12/18/23 0436   Weight: 83.5 kg (184 lb) 83.1 kg (183 lb 4.8 oz) 83.6 kg (184 lb 6.4 oz)     Vital Signs with Ranges  Temp:  [97.6  F (36.4  C)-98.6  F (37  C)] 97.6  F (36.4  C)  Pulse:  [55-68] 63  Resp:  [16-18] 18  BP: (104-145)/(70-95) 118/87  SpO2:  [97 %-100 %] 99 %  I/O last 3 completed shifts:  In: 161.09 [I.V.:161.09]  Out: -     Constitutional: Resting comfortably, alert, NAD  Respiratory: CTAB, no wheeze, no increased work of breathing  Cardiovascular: HRRR, no MGR, no LE edema  GI: S, NT, ND, +BS  Skin/Integumen: warm/dry  Other:      Medications    heparin 1,000 Units/hr (12/17/23 2009)    - MEDICATION INSTRUCTIONS -      - MEDICATION INSTRUCTIONS -      ACE/ARB/ARNI NOT PRESCRIBED        amLODIPine  5 mg Oral Daily    aspirin  81 mg Oral Daily    atorvastatin  40 mg Oral QPM    " lisinopril  2.5 mg Oral Daily    metoprolol tartrate  12.5 mg Oral BID    senna-docusate  1 tablet Oral BID    Or    senna-docusate  2 tablet Oral BID    sodium chloride (PF)  3 mL Intracatheter Q8H       Data   Recent Labs   Lab 12/18/23  0705 12/17/23  0408 12/16/23  0636 12/15/23  1823   WBC 7.3  --  6.6 8.5   HGB 14.8  --  14.6 15.0   MCV 89  --  89 90     --  155 170   NA  --  140 141 139   POTASSIUM  --  4.0 3.9 4.6   CHLORIDE  --  107 108* 103   CO2  --  25 26 27   BUN  --  15.9 10.1 13.0   CR  --  0.92 0.82 0.91   ANIONGAP  --  8 7 9   STEPHEN  --  8.7* 8.6* 8.9   GLC  --  104* 109* 105*   ALBUMIN  --   --  3.9  --    PROTTOTAL  --   --  6.5  --    BILITOTAL  --   --  0.9  --    ALKPHOS  --   --  46  --    ALT  --   --  46  --    AST  --   --  40  --        Recent Results (from the past 24 hour(s))   US Carotid Bilateral    Narrative    EXAM: US CAROTID BILATERAL  LOCATION: [facility]  DATE: 12/17/2023    INDICATION: CABG preoperative assessment.  COMPARISON: None available.  TECHNIQUE: Duplex exam performed utilizing 2D gray-scale imaging, Doppler interrogation with color-flow and spectral waveform analysis. The percent diameter stenosis is determined using NASCET criteria and Society of Radiologists in Ultrasound Consensus   Criteria.    FINDINGS:    RIGHT: Mild plaque at the bifurcation. The peak systolic velocity in the ICA is less than 125 cm/sec, consistent with less than 50% stenosis. Normal velocities in the ECA. Antegrade flow within the vertebral artery.     LEFT: Mild plaque at the bifurcation. The peak systolic velocity in the ICA is less than 125 cm/sec, consistent with less than 50% stenosis. Normal velocities in the ECA. Antegrade flow within the vertebral artery.    VELOCITY CHART:  CCA   Right: 79 cm/s   Left: 69 cm/s  ICA   Right: 85 cm/s   Left: 76 cm/s  ECA   Right: 70 cm/s   Left: 73 cm/s  ICA/CCA PSV Ratio   Right: 1.1   Left: 1.1      Impression     IMPRESSION:  1.  Mild plaque formation, velocities consistent with less than 50% stenosis in the right internal carotid artery.  2.  Mild plaque formation, velocities consistent with less than 50% stenosis in the left internal carotid artery.  3.  Flow within the vertebral arteries is antegrade.   US Upper Extremity Arterial Duplex Left    Narrative    EXAM: US UPPER EXTREMITY ARTERIAL DUPLEX LEFT  LOCATION: [facility]  DATE: 12/17/2023    INDICATION: preop cabg, poss radial artery harvest  COMPARISON: None.  TECHNIQUE: Arterial Duplex ultrasound of the left arm. Color flow and spectral Doppler with waveform analysis performed.    FINDINGS (LEFT upper extremity):    ARTERIAL PEAK SYSTOLIC VELOCITIES (cm/s):  Brachial (distal): 64 cm/s  Radial: 64 cm/s  Ulnar: 66 cm/s    Radial artery diameter:  Proximal: 2.1 mm  Proximal to mid: 2.1 mm  Mid: 2.3 mm  Mid to distal: 2.5 mm  Distal: 2.2 mm    WAVEFORMS/COLOR DOPPLER: Normal high resistance arterial waveforms within the visualized portions of the brachial, radial, and ulnar arteries.      Impression    IMPRESSION:   1.  Radial artery measurements as above.   US Lower Extremity Venous Mapping Bilateral    Narrative    EXAM: ULTRASOUND LOWER EXTREMITY VENOUS MAPPING BILATERAL  LOCATION: [facility]  DATE: 12/17/2023    INDICATION: Preop CABG.  COMPARISON: None.  TECHNIQUE: Ultrasound examination of the lower extremity veins was performed, including gray-scale and compression imaging.     LOWER  EXTREMITY FINDINGS:       VENOUS DIAMETERS  The right great saphenous vein ranges from 2.5 - 4.8 mm throughout the thigh and 2.3 - 4.3 mm at and below the level of the knee. At least 3 side branches noted in the proximal to mid thigh levels.    The left great saphenous vein ranges from 3.6 - 4.7 mm throughout the thigh and 2-2 0.9 mm below the level of the knee. At least two side branches noted in the thigh and additional two side  branches near the level of the knee.      Impression    IMPRESSION: Bilateral greater saphenous vein measurements as described.       CT Chest w/o Contrast    Narrative    EXAM: CT CHEST W/O CONTRAST  LOCATION: [facility]  DATE: 12/17/2023    INDICATION: Preop CABG. Evaluate plaque on ascending aorta.  COMPARISON: None.  TECHNIQUE: CT chest without IV contrast. Multiplanar reformats were obtained. Dose reduction techniques were used.  CONTRAST: None.    FINDINGS:     LUNGS AND PLEURA: Mild scarring at the lung apices. Solid 3 mm nodule in the lingula (4/#228). Small intrapulmonary lymph node along the left major fissure. Tiny punctate calcified left lower lobe granuloma. No consolidations or pleural effusions.    MEDIASTINUM/AXILLAE: No thoracic aortic aneurysm. No significant atherosclerotic calcifications on the ascending thoracic aorta. Minimal atherosclerotic calcifications at the aortic arch and in the descending thoracic aorta. Normal cardiac size. No   pericardial effusion. No enlarged thoracic lymph node.    CORONARY ARTERY CALCIFICATION: Moderate.    UPPER ABDOMEN: Small hepatic cysts, which do not require follow-up.    MUSCULOSKELETAL: Multilevel degenerative changes of the spine. No acute bony abnormality.      Impression    IMPRESSION:     1.  No significant atherosclerotic calcifications on the ascending thoracic aorta, as queried.    2.  Incidental solid 3 mm nodule in the lingula. See follow-up guidelines below.    REFERENCE:  Guidelines for Management of Incidental Pulmonary Nodules Detected on CT Images: From the Fleischner Society 2017.   Guidelines apply to incidental nodules in patients who are 35 years or older.  Guidelines do not apply to lung cancer screening, patients with immunosuppression, or patients with known primary cancer.    SINGLE NODULE  Nodule size <6 mm  Low-risk patients: No follow-up needed.  High-risk patients: Optional follow-up at 12 months.

## 2023-12-18 NOTE — PROVIDER NOTIFICATION
MD Notification    Notified Person: MD    Notified Person Name: Pedro    Notification Date/Time: 12/17/23 8:41 PM    Notification Interaction: Vocera    Purpose of Notification: FYI patient had episode of 5/10 tight chest pain. EKG WDL, pain had passed by the time SL nitro was available so none was given. Heparin still infusing. *[id number] HALIE Newby

## 2023-12-18 NOTE — PLAN OF CARE
"Goal Outcome Evaluation:  Neuro- A/Ox4  Most Recent Vitals- /87 (BP Location: Left arm)   Pulse 63   Temp 97.6  F (36.4  C) (Oral)   Resp 18   Ht 1.75 m (5' 8.9\")   Wt 83.6 kg (184 lb 6.4 oz)   SpO2 99%   BMI 27.31 kg/m    Tele/Cardiac- SB-SR  Resp- on RA, LS clear (Hx AWA on cpap)  Activity- independent  Pain- one episode of transient chest tightness resolved w/o intervention-cards provider aware   Drips- heparin infusing @ 1000 units/hr, Xa recheck tomorrow am  Drains/Tubes- PIV  Skin- R radial site from 12/16 CDI, CMS intact  GI/- WDL  Plan- CV surgery to revisit patient today and discuss timing for CABG this week                         "

## 2023-12-18 NOTE — PLAN OF CARE
Neuro- A/Ox4  Most Recent Vitals- Temp: 97.7  F (36.5  C) Temp src: Oral BP: 104/70 Pulse: 55   Resp: 18 SpO2: 100 % O2 Device: None (Room air)   Tele/Cardiac- SB  Resp- on RA, LS clear  Activity- independent  Pain- one episode of 5/10 chest pain in the evening; EKG normal, pain resolved w/o intervention  Drips- heparin infusing @ 1000 units/hr, Xa recheck for 06:00  Drains/Tubes- PIV  Skin- R radial site from 12/16 CDI, CMS intact  GI/- WDL  Plan- CV surgery to revisit patient today and discuss timing for CAB this week    Keyonna Hays RN

## 2023-12-19 ENCOUNTER — APPOINTMENT (OUTPATIENT)
Dept: GENERAL RADIOLOGY | Facility: CLINIC | Age: 63
End: 2023-12-19
Attending: INTERNAL MEDICINE
Payer: COMMERCIAL

## 2023-12-19 ENCOUNTER — APPOINTMENT (OUTPATIENT)
Dept: GENERAL RADIOLOGY | Facility: CLINIC | Age: 63
End: 2023-12-19
Attending: SURGERY
Payer: COMMERCIAL

## 2023-12-19 ENCOUNTER — ANESTHESIA (OUTPATIENT)
Dept: SURGERY | Facility: CLINIC | Age: 63
End: 2023-12-19
Payer: COMMERCIAL

## 2023-12-19 ENCOUNTER — ANESTHESIA EVENT (OUTPATIENT)
Dept: SURGERY | Facility: CLINIC | Age: 63
End: 2023-12-19
Payer: COMMERCIAL

## 2023-12-19 PROBLEM — I25.10 CORONARY ARTERY DISEASE: Status: ACTIVE | Noted: 2023-12-19

## 2023-12-19 LAB
ABO/RH(D): NORMAL
ALBUMIN SERPL BCG-MCNC: 3.4 G/DL (ref 3.5–5.2)
ALLEN'S TEST: ABNORMAL
ALLEN'S TEST: NO
ALLEN'S TEST: NO
ALP SERPL-CCNC: 35 U/L (ref 40–150)
ALT SERPL W P-5'-P-CCNC: 68 U/L (ref 0–70)
ANION GAP SERPL CALCULATED.3IONS-SCNC: 12 MMOL/L (ref 7–15)
ANION GAP SERPL CALCULATED.3IONS-SCNC: 8 MMOL/L (ref 7–15)
ANTIBODY SCREEN: NEGATIVE
APTT PPP: 29 SECONDS (ref 22–38)
APTT PPP: 32 SECONDS (ref 22–38)
AST SERPL W P-5'-P-CCNC: 77 U/L (ref 0–45)
ATRIAL RATE - MUSE: 67 BPM
BASE EXCESS BLDA CALC-SCNC: -0.7 MMOL/L (ref -9–1.8)
BASE EXCESS BLDA CALC-SCNC: -4.2 MMOL/L (ref -9–1.8)
BASE EXCESS BLDA CALC-SCNC: -6.3 MMOL/L (ref -9–1.8)
BILIRUB SERPL-MCNC: 0.8 MG/DL
BUN SERPL-MCNC: 14.3 MG/DL (ref 8–23)
BUN SERPL-MCNC: 14.7 MG/DL (ref 8–23)
CA-I BLD-MCNC: 4.4 MG/DL (ref 4.4–5.2)
CALCIUM SERPL-MCNC: 8.3 MG/DL (ref 8.8–10.2)
CALCIUM SERPL-MCNC: 8.4 MG/DL (ref 8.8–10.2)
CHLORIDE SERPL-SCNC: 109 MMOL/L (ref 98–107)
CHLORIDE SERPL-SCNC: 109 MMOL/L (ref 98–107)
CREAT SERPL-MCNC: 0.93 MG/DL (ref 0.67–1.17)
CREAT SERPL-MCNC: 0.95 MG/DL (ref 0.67–1.17)
DEPRECATED HCO3 PLAS-SCNC: 21 MMOL/L (ref 22–29)
DEPRECATED HCO3 PLAS-SCNC: 22 MMOL/L (ref 22–29)
DIASTOLIC BLOOD PRESSURE - MUSE: NORMAL MMHG
EGFRCR SERPLBLD CKD-EPI 2021: 89 ML/MIN/1.73M2
EGFRCR SERPLBLD CKD-EPI 2021: >90 ML/MIN/1.73M2
ERYTHROCYTE [DISTWIDTH] IN BLOOD BY AUTOMATED COUNT: 11.7 % (ref 10–15)
ERYTHROCYTE [DISTWIDTH] IN BLOOD BY AUTOMATED COUNT: 11.9 % (ref 10–15)
ERYTHROCYTE [DISTWIDTH] IN BLOOD BY AUTOMATED COUNT: 11.9 % (ref 10–15)
FIBRINOGEN PPP-MCNC: 278 MG/DL (ref 170–490)
GLUCOSE BLDC GLUCOMTR-MCNC: 141 MG/DL (ref 70–99)
GLUCOSE BLDC GLUCOMTR-MCNC: 159 MG/DL (ref 70–99)
GLUCOSE BLDC GLUCOMTR-MCNC: 160 MG/DL (ref 70–99)
GLUCOSE BLDC GLUCOMTR-MCNC: 173 MG/DL (ref 70–99)
GLUCOSE BLDC GLUCOMTR-MCNC: 182 MG/DL (ref 70–99)
GLUCOSE BLDC GLUCOMTR-MCNC: 188 MG/DL (ref 70–99)
GLUCOSE BLDC GLUCOMTR-MCNC: 192 MG/DL (ref 70–99)
GLUCOSE BLDC GLUCOMTR-MCNC: 194 MG/DL (ref 70–99)
GLUCOSE BLDC GLUCOMTR-MCNC: 194 MG/DL (ref 70–99)
GLUCOSE SERPL-MCNC: 180 MG/DL (ref 70–99)
GLUCOSE SERPL-MCNC: 188 MG/DL (ref 70–99)
HCO3 BLD-SCNC: 20 MMOL/L (ref 21–28)
HCO3 BLD-SCNC: 21 MMOL/L (ref 21–28)
HCO3 BLD-SCNC: 24 MMOL/L (ref 21–28)
HCT VFR BLD AUTO: 32.8 % (ref 40–53)
HCT VFR BLD AUTO: 37.5 % (ref 40–53)
HCT VFR BLD AUTO: 43.1 % (ref 40–53)
HGB BLD-MCNC: 11 G/DL (ref 13.3–17.7)
HGB BLD-MCNC: 12.5 G/DL (ref 13.3–17.7)
HGB BLD-MCNC: 14.4 G/DL (ref 13.3–17.7)
INR PPP: 1.21 (ref 0.85–1.15)
INR PPP: 1.32 (ref 0.85–1.15)
INTERPRETATION ECG - MUSE: NORMAL
LACTATE SERPL-SCNC: 2.9 MMOL/L (ref 0.7–2)
LACTATE SERPL-SCNC: 3.5 MMOL/L (ref 0.7–2)
LACTATE SERPL-SCNC: 6.5 MMOL/L (ref 0.7–2)
MAGNESIUM SERPL-MCNC: 2.3 MG/DL (ref 1.7–2.3)
MCH RBC QN AUTO: 29.8 PG (ref 26.5–33)
MCH RBC QN AUTO: 29.9 PG (ref 26.5–33)
MCH RBC QN AUTO: 30.1 PG (ref 26.5–33)
MCHC RBC AUTO-ENTMCNC: 33.3 G/DL (ref 31.5–36.5)
MCHC RBC AUTO-ENTMCNC: 33.4 G/DL (ref 31.5–36.5)
MCHC RBC AUTO-ENTMCNC: 33.5 G/DL (ref 31.5–36.5)
MCV RBC AUTO: 89 FL (ref 78–100)
MCV RBC AUTO: 89 FL (ref 78–100)
MCV RBC AUTO: 90 FL (ref 78–100)
O2/TOTAL GAS SETTING VFR VENT: 30 %
O2/TOTAL GAS SETTING VFR VENT: 36 %
O2/TOTAL GAS SETTING VFR VENT: 40 %
P AXIS - MUSE: 52 DEGREES
PCO2 BLD: 36 MM HG (ref 35–45)
PCO2 BLD: 39 MM HG (ref 35–45)
PCO2 BLD: 39 MM HG (ref 35–45)
PH BLD: 7.31 [PH] (ref 7.35–7.45)
PH BLD: 7.37 [PH] (ref 7.35–7.45)
PH BLD: 7.4 [PH] (ref 7.35–7.45)
PHOSPHATE SERPL-MCNC: 3 MG/DL (ref 2.5–4.5)
PLATELET # BLD AUTO: 144 10E3/UL (ref 150–450)
PLATELET # BLD AUTO: 153 10E3/UL (ref 150–450)
PLATELET # BLD AUTO: 191 10E3/UL (ref 150–450)
PO2 BLD: 130 MM HG (ref 80–105)
PO2 BLD: 151 MM HG (ref 80–105)
PO2 BLD: 96 MM HG (ref 80–105)
POTASSIUM SERPL-SCNC: 4 MMOL/L (ref 3.4–5.3)
POTASSIUM SERPL-SCNC: 4.7 MMOL/L (ref 3.4–5.3)
PR INTERVAL - MUSE: 188 MS
PROT SERPL-MCNC: 5.5 G/DL (ref 6.4–8.3)
QRS DURATION - MUSE: 96 MS
QT - MUSE: 382 MS
QTC - MUSE: 403 MS
R AXIS - MUSE: -9 DEGREES
RBC # BLD AUTO: 3.65 10E6/UL (ref 4.4–5.9)
RBC # BLD AUTO: 4.2 10E6/UL (ref 4.4–5.9)
RBC # BLD AUTO: 4.82 10E6/UL (ref 4.4–5.9)
SODIUM SERPL-SCNC: 139 MMOL/L (ref 135–145)
SODIUM SERPL-SCNC: 142 MMOL/L (ref 135–145)
SPECIMEN EXPIRATION DATE: NORMAL
SYSTOLIC BLOOD PRESSURE - MUSE: NORMAL MMHG
T AXIS - MUSE: 35 DEGREES
VENTRICULAR RATE- MUSE: 67 BPM
WBC # BLD AUTO: 20.4 10E3/UL (ref 4–11)
WBC # BLD AUTO: 22.8 10E3/UL (ref 4–11)
WBC # BLD AUTO: 9 10E3/UL (ref 4–11)

## 2023-12-19 PROCEDURE — 250N000009 HC RX 250: Performed by: INTERNAL MEDICINE

## 2023-12-19 PROCEDURE — 999N000009 HC STATISTIC AIRWAY CARE

## 2023-12-19 PROCEDURE — 33517 CABG ARTERY-VEIN SINGLE: CPT | Mod: GC | Performed by: STUDENT IN AN ORGANIZED HEALTH CARE EDUCATION/TRAINING PROGRAM

## 2023-12-19 PROCEDURE — 5A1221Z PERFORMANCE OF CARDIAC OUTPUT, CONTINUOUS: ICD-10-PCS | Performed by: STUDENT IN AN ORGANIZED HEALTH CARE EDUCATION/TRAINING PROGRAM

## 2023-12-19 PROCEDURE — 258N000003 HC RX IP 258 OP 636: Performed by: NURSE ANESTHETIST, CERTIFIED REGISTERED

## 2023-12-19 PROCEDURE — 94002 VENT MGMT INPAT INIT DAY: CPT

## 2023-12-19 PROCEDURE — 33534 CABG ARTERIAL TWO: CPT | Mod: GC | Performed by: STUDENT IN AN ORGANIZED HEALTH CARE EDUCATION/TRAINING PROGRAM

## 2023-12-19 PROCEDURE — 82803 BLOOD GASES ANY COMBINATION: CPT | Performed by: STUDENT IN AN ORGANIZED HEALTH CARE EDUCATION/TRAINING PROGRAM

## 2023-12-19 PROCEDURE — 999N000253 HC STATISTIC WEANING TRIALS

## 2023-12-19 PROCEDURE — 250N000013 HC RX MED GY IP 250 OP 250 PS 637: Performed by: PHYSICIAN ASSISTANT

## 2023-12-19 PROCEDURE — 999N000157 HC STATISTIC RCP TIME EA 10 MIN

## 2023-12-19 PROCEDURE — 250N000013 HC RX MED GY IP 250 OP 250 PS 637: Performed by: SURGERY

## 2023-12-19 PROCEDURE — 999N000141 HC STATISTIC PRE-PROCEDURE NURSING ASSESSMENT: Performed by: STUDENT IN AN ORGANIZED HEALTH CARE EDUCATION/TRAINING PROGRAM

## 2023-12-19 PROCEDURE — 3E043XZ INTRODUCTION OF VASOPRESSOR INTO CENTRAL VEIN, PERCUTANEOUS APPROACH: ICD-10-PCS | Performed by: INTERNAL MEDICINE

## 2023-12-19 PROCEDURE — 02100AW BYPASS CORONARY ARTERY, ONE ARTERY FROM AORTA WITH AUTOLOGOUS ARTERIAL TISSUE, OPEN APPROACH: ICD-10-PCS | Performed by: STUDENT IN AN ORGANIZED HEALTH CARE EDUCATION/TRAINING PROGRAM

## 2023-12-19 PROCEDURE — 85610 PROTHROMBIN TIME: CPT | Performed by: SURGERY

## 2023-12-19 PROCEDURE — 250N000009 HC RX 250: Performed by: SURGERY

## 2023-12-19 PROCEDURE — 82803 BLOOD GASES ANY COMBINATION: CPT | Performed by: INTERNAL MEDICINE

## 2023-12-19 PROCEDURE — 999N000065 XR ABDOMEN PORT 1 VIEW

## 2023-12-19 PROCEDURE — 258N000003 HC RX IP 258 OP 636: Performed by: SURGERY

## 2023-12-19 PROCEDURE — 36620 INSERTION CATHETER ARTERY: CPT | Performed by: INTERNAL MEDICINE

## 2023-12-19 PROCEDURE — 410N000005 HC PER-PERFUSION, SH ONLY, 1ST 30 MIN: Performed by: STUDENT IN AN ORGANIZED HEALTH CARE EDUCATION/TRAINING PROGRAM

## 2023-12-19 PROCEDURE — 999N000065 XR CHEST PORT 1 VIEW

## 2023-12-19 PROCEDURE — 36415 COLL VENOUS BLD VENIPUNCTURE: CPT | Performed by: INTERNAL MEDICINE

## 2023-12-19 PROCEDURE — 250N000011 HC RX IP 250 OP 636: Performed by: SURGERY

## 2023-12-19 PROCEDURE — 85384 FIBRINOGEN ACTIVITY: CPT | Performed by: STUDENT IN AN ORGANIZED HEALTH CARE EDUCATION/TRAINING PROGRAM

## 2023-12-19 PROCEDURE — 82374 ASSAY BLOOD CARBON DIOXIDE: CPT | Performed by: STUDENT IN AN ORGANIZED HEALTH CARE EDUCATION/TRAINING PROGRAM

## 2023-12-19 PROCEDURE — 250N000009 HC RX 250: Performed by: ANESTHESIOLOGY

## 2023-12-19 PROCEDURE — 02100Z9 BYPASS CORONARY ARTERY, ONE ARTERY FROM LEFT INTERNAL MAMMARY, OPEN APPROACH: ICD-10-PCS | Performed by: STUDENT IN AN ORGANIZED HEALTH CARE EDUCATION/TRAINING PROGRAM

## 2023-12-19 PROCEDURE — 200N000001 HC R&B ICU

## 2023-12-19 PROCEDURE — 03BC4ZZ EXCISION OF LEFT RADIAL ARTERY, PERCUTANEOUS ENDOSCOPIC APPROACH: ICD-10-PCS | Performed by: STUDENT IN AN ORGANIZED HEALTH CARE EDUCATION/TRAINING PROGRAM

## 2023-12-19 PROCEDURE — 258N000003 HC RX IP 258 OP 636: Performed by: ANESTHESIOLOGY

## 2023-12-19 PROCEDURE — 410N000006: Performed by: STUDENT IN AN ORGANIZED HEALTH CARE EDUCATION/TRAINING PROGRAM

## 2023-12-19 PROCEDURE — 82803 BLOOD GASES ANY COMBINATION: CPT | Performed by: SURGERY

## 2023-12-19 PROCEDURE — 250N000024 HC ISOFLURANE, PER MIN: Performed by: STUDENT IN AN ORGANIZED HEALTH CARE EDUCATION/TRAINING PROGRAM

## 2023-12-19 PROCEDURE — 06BQ4ZZ EXCISION OF LEFT SAPHENOUS VEIN, PERCUTANEOUS ENDOSCOPIC APPROACH: ICD-10-PCS | Performed by: STUDENT IN AN ORGANIZED HEALTH CARE EDUCATION/TRAINING PROGRAM

## 2023-12-19 PROCEDURE — 86923 COMPATIBILITY TEST ELECTRIC: CPT | Performed by: SURGERY

## 2023-12-19 PROCEDURE — 370N000017 HC ANESTHESIA TECHNICAL FEE, PER MIN: Performed by: STUDENT IN AN ORGANIZED HEALTH CARE EDUCATION/TRAINING PROGRAM

## 2023-12-19 PROCEDURE — 84295 ASSAY OF SERUM SODIUM: CPT | Performed by: STUDENT IN AN ORGANIZED HEALTH CARE EDUCATION/TRAINING PROGRAM

## 2023-12-19 PROCEDURE — 85027 COMPLETE CBC AUTOMATED: CPT | Performed by: INTERNAL MEDICINE

## 2023-12-19 PROCEDURE — 84100 ASSAY OF PHOSPHORUS: CPT | Performed by: SURGERY

## 2023-12-19 PROCEDURE — 250N000011 HC RX IP 250 OP 636: Performed by: STUDENT IN AN ORGANIZED HEALTH CARE EDUCATION/TRAINING PROGRAM

## 2023-12-19 PROCEDURE — 99291 CRITICAL CARE FIRST HOUR: CPT | Mod: 25 | Performed by: INTERNAL MEDICINE

## 2023-12-19 PROCEDURE — 83605 ASSAY OF LACTIC ACID: CPT | Performed by: INTERNAL MEDICINE

## 2023-12-19 PROCEDURE — 250N000011 HC RX IP 250 OP 636: Performed by: NURSE ANESTHETIST, CERTIFIED REGISTERED

## 2023-12-19 PROCEDURE — 250N000009 HC RX 250: Performed by: NURSE ANESTHETIST, CERTIFIED REGISTERED

## 2023-12-19 PROCEDURE — 85610 PROTHROMBIN TIME: CPT | Performed by: STUDENT IN AN ORGANIZED HEALTH CARE EDUCATION/TRAINING PROGRAM

## 2023-12-19 PROCEDURE — 86900 BLOOD TYPING SEROLOGIC ABO: CPT | Performed by: INTERNAL MEDICINE

## 2023-12-19 PROCEDURE — 85048 AUTOMATED LEUKOCYTE COUNT: CPT | Performed by: SURGERY

## 2023-12-19 PROCEDURE — 83735 ASSAY OF MAGNESIUM: CPT | Performed by: SURGERY

## 2023-12-19 PROCEDURE — 83605 ASSAY OF LACTIC ACID: CPT | Performed by: SURGERY

## 2023-12-19 PROCEDURE — 258N000003 HC RX IP 258 OP 636: Performed by: STUDENT IN AN ORGANIZED HEALTH CARE EDUCATION/TRAINING PROGRAM

## 2023-12-19 PROCEDURE — 250N000009 HC RX 250: Performed by: STUDENT IN AN ORGANIZED HEALTH CARE EDUCATION/TRAINING PROGRAM

## 2023-12-19 PROCEDURE — 85730 THROMBOPLASTIN TIME PARTIAL: CPT | Performed by: SURGERY

## 2023-12-19 PROCEDURE — 99207 PR NO CHARGE LOS: CPT | Performed by: HOSPITALIST

## 2023-12-19 PROCEDURE — 360N000079 HC SURGERY LEVEL 6, PER MIN: Performed by: STUDENT IN AN ORGANIZED HEALTH CARE EDUCATION/TRAINING PROGRAM

## 2023-12-19 PROCEDURE — C1898 LEAD, PMKR, OTHER THAN TRANS: HCPCS | Performed by: STUDENT IN AN ORGANIZED HEALTH CARE EDUCATION/TRAINING PROGRAM

## 2023-12-19 PROCEDURE — 250N000012 HC RX MED GY IP 250 OP 636 PS 637: Mod: JZ | Performed by: STUDENT IN AN ORGANIZED HEALTH CARE EDUCATION/TRAINING PROGRAM

## 2023-12-19 PROCEDURE — P9016 RBC LEUKOCYTES REDUCED: HCPCS | Performed by: SURGERY

## 2023-12-19 PROCEDURE — 021009W BYPASS CORONARY ARTERY, ONE ARTERY FROM AORTA WITH AUTOLOGOUS VENOUS TISSUE, OPEN APPROACH: ICD-10-PCS | Performed by: STUDENT IN AN ORGANIZED HEALTH CARE EDUCATION/TRAINING PROGRAM

## 2023-12-19 PROCEDURE — 250N000011 HC RX IP 250 OP 636: Performed by: INTERNAL MEDICINE

## 2023-12-19 PROCEDURE — 82330 ASSAY OF CALCIUM: CPT | Performed by: SURGERY

## 2023-12-19 PROCEDURE — 85014 HEMATOCRIT: CPT | Performed by: STUDENT IN AN ORGANIZED HEALTH CARE EDUCATION/TRAINING PROGRAM

## 2023-12-19 PROCEDURE — 258N000003 HC RX IP 258 OP 636: Performed by: INTERNAL MEDICINE

## 2023-12-19 PROCEDURE — 85730 THROMBOPLASTIN TIME PARTIAL: CPT | Performed by: STUDENT IN AN ORGANIZED HEALTH CARE EDUCATION/TRAINING PROGRAM

## 2023-12-19 PROCEDURE — 272N000001 HC OR GENERAL SUPPLY STERILE: Performed by: STUDENT IN AN ORGANIZED HEALTH CARE EDUCATION/TRAINING PROGRAM

## 2023-12-19 RX ORDER — CHLORHEXIDINE GLUCONATE ORAL RINSE 1.2 MG/ML
10 SOLUTION DENTAL ONCE
Status: COMPLETED | OUTPATIENT
Start: 2023-12-19 | End: 2023-12-19

## 2023-12-19 RX ORDER — PROPOFOL 10 MG/ML
5-75 INJECTION, EMULSION INTRAVENOUS CONTINUOUS
Status: DISCONTINUED | OUTPATIENT
Start: 2023-12-19 | End: 2023-12-20

## 2023-12-19 RX ORDER — CALCIUM GLUCONATE 20 MG/ML
2 INJECTION, SOLUTION INTRAVENOUS
Status: DISCONTINUED | OUTPATIENT
Start: 2023-12-19 | End: 2023-12-22 | Stop reason: HOSPADM

## 2023-12-19 RX ORDER — ACETAMINOPHEN 325 MG/1
975 TABLET ORAL EVERY 8 HOURS
Status: DISCONTINUED | OUTPATIENT
Start: 2023-12-19 | End: 2023-12-19

## 2023-12-19 RX ORDER — DEXMEDETOMIDINE HYDROCHLORIDE 4 UG/ML
.1-1.2 INJECTION, SOLUTION INTRAVENOUS CONTINUOUS
Status: DISCONTINUED | OUTPATIENT
Start: 2023-12-19 | End: 2023-12-19 | Stop reason: HOSPADM

## 2023-12-19 RX ORDER — POLYETHYLENE GLYCOL 3350 17 G/17G
17 POWDER, FOR SOLUTION ORAL DAILY
Status: DISCONTINUED | OUTPATIENT
Start: 2023-12-20 | End: 2023-12-22 | Stop reason: HOSPADM

## 2023-12-19 RX ORDER — CLINDAMYCIN PHOSPHATE 900 MG/50ML
900 INJECTION, SOLUTION INTRAVENOUS EVERY 8 HOURS
Qty: 150 ML | Refills: 0 | Status: COMPLETED | OUTPATIENT
Start: 2023-12-19 | End: 2023-12-20

## 2023-12-19 RX ORDER — HYDROMORPHONE HCL IN WATER/PF 6 MG/30 ML
0.4 PATIENT CONTROLLED ANALGESIA SYRINGE INTRAVENOUS
Status: DISCONTINUED | OUTPATIENT
Start: 2023-12-19 | End: 2023-12-20

## 2023-12-19 RX ORDER — EPINEPHRINE IN 0.9 % SOD CHLOR 5 MG/250ML
.01-.1 PLASTIC BAG, INJECTION (ML) INTRAVENOUS CONTINUOUS
Status: DISCONTINUED | OUTPATIENT
Start: 2023-12-19 | End: 2023-12-20

## 2023-12-19 RX ORDER — SODIUM CHLORIDE, SODIUM LACTATE, POTASSIUM CHLORIDE, CALCIUM CHLORIDE 600; 310; 30; 20 MG/100ML; MG/100ML; MG/100ML; MG/100ML
INJECTION, SOLUTION INTRAVENOUS CONTINUOUS
Status: DISCONTINUED | OUTPATIENT
Start: 2023-12-19 | End: 2023-12-19 | Stop reason: HOSPADM

## 2023-12-19 RX ORDER — FENTANYL CITRATE 50 UG/ML
INJECTION, SOLUTION INTRAMUSCULAR; INTRAVENOUS PRN
Status: DISCONTINUED | OUTPATIENT
Start: 2023-12-19 | End: 2023-12-19

## 2023-12-19 RX ORDER — VECURONIUM BROMIDE 1 MG/ML
INJECTION, POWDER, LYOPHILIZED, FOR SOLUTION INTRAVENOUS PRN
Status: DISCONTINUED | OUTPATIENT
Start: 2023-12-19 | End: 2023-12-19

## 2023-12-19 RX ORDER — CLINDAMYCIN PHOSPHATE 900 MG/50ML
900 INJECTION, SOLUTION INTRAVENOUS SEE ADMIN INSTRUCTIONS
Status: DISCONTINUED | OUTPATIENT
Start: 2023-12-19 | End: 2023-12-19 | Stop reason: HOSPADM

## 2023-12-19 RX ORDER — PROPOFOL 10 MG/ML
INJECTION, EMULSION INTRAVENOUS CONTINUOUS PRN
Status: DISCONTINUED | OUTPATIENT
Start: 2023-12-19 | End: 2023-12-19

## 2023-12-19 RX ORDER — NOREPINEPHRINE BITARTRATE 0.06 MG/ML
.01-.4 INJECTION, SOLUTION INTRAVENOUS CONTINUOUS PRN
Status: DISCONTINUED | OUTPATIENT
Start: 2023-12-19 | End: 2023-12-19

## 2023-12-19 RX ORDER — NOREPINEPHRINE BITARTRATE 0.02 MG/ML
.01-.1 INJECTION, SOLUTION INTRAVENOUS CONTINUOUS
Status: DISCONTINUED | OUTPATIENT
Start: 2023-12-19 | End: 2023-12-19 | Stop reason: HOSPADM

## 2023-12-19 RX ORDER — LIDOCAINE 40 MG/G
CREAM TOPICAL
Status: DISCONTINUED | OUTPATIENT
Start: 2023-12-19 | End: 2023-12-22 | Stop reason: HOSPADM

## 2023-12-19 RX ORDER — CLINDAMYCIN PHOSPHATE 900 MG/50ML
900 INJECTION, SOLUTION INTRAVENOUS
Status: COMPLETED | OUTPATIENT
Start: 2023-12-19 | End: 2023-12-19

## 2023-12-19 RX ORDER — HYDRALAZINE HYDROCHLORIDE 20 MG/ML
10 INJECTION INTRAMUSCULAR; INTRAVENOUS EVERY 30 MIN PRN
Status: DISCONTINUED | OUTPATIENT
Start: 2023-12-19 | End: 2023-12-22 | Stop reason: HOSPADM

## 2023-12-19 RX ORDER — PROPOFOL 10 MG/ML
INJECTION, EMULSION INTRAVENOUS PRN
Status: DISCONTINUED | OUTPATIENT
Start: 2023-12-19 | End: 2023-12-19

## 2023-12-19 RX ORDER — GLYCOPYRROLATE 0.2 MG/ML
INJECTION, SOLUTION INTRAMUSCULAR; INTRAVENOUS PRN
Status: DISCONTINUED | OUTPATIENT
Start: 2023-12-19 | End: 2023-12-19

## 2023-12-19 RX ORDER — SODIUM CHLORIDE, SODIUM LACTATE, POTASSIUM CHLORIDE, CALCIUM CHLORIDE 600; 310; 30; 20 MG/100ML; MG/100ML; MG/100ML; MG/100ML
INJECTION, SOLUTION INTRAVENOUS CONTINUOUS PRN
Status: DISCONTINUED | OUTPATIENT
Start: 2023-12-19 | End: 2023-12-19

## 2023-12-19 RX ORDER — MAGNESIUM HYDROXIDE 1200 MG/15ML
LIQUID ORAL PRN
Status: DISCONTINUED | OUTPATIENT
Start: 2023-12-19 | End: 2023-12-19 | Stop reason: HOSPADM

## 2023-12-19 RX ORDER — LIDOCAINE 40 MG/G
CREAM TOPICAL
Status: DISCONTINUED | OUTPATIENT
Start: 2023-12-19 | End: 2023-12-19 | Stop reason: HOSPADM

## 2023-12-19 RX ORDER — HEPARIN SODIUM 5000 [USP'U]/.5ML
5000 INJECTION, SOLUTION INTRAVENOUS; SUBCUTANEOUS EVERY 8 HOURS
Status: DISCONTINUED | OUTPATIENT
Start: 2023-12-20 | End: 2023-12-22 | Stop reason: HOSPADM

## 2023-12-19 RX ORDER — SODIUM CHLORIDE 9 MG/ML
INJECTION, SOLUTION INTRAVENOUS CONTINUOUS PRN
Status: DISCONTINUED | OUTPATIENT
Start: 2023-12-19 | End: 2023-12-19

## 2023-12-19 RX ORDER — PHENYLEPHRINE HCL IN 0.9% NACL 50MG/250ML
.1-6 PLASTIC BAG, INJECTION (ML) INTRAVENOUS CONTINUOUS
Status: DISCONTINUED | OUTPATIENT
Start: 2023-12-19 | End: 2023-12-19 | Stop reason: HOSPADM

## 2023-12-19 RX ORDER — ONDANSETRON 4 MG/1
4 TABLET, ORALLY DISINTEGRATING ORAL EVERY 6 HOURS PRN
Status: DISCONTINUED | OUTPATIENT
Start: 2023-12-19 | End: 2023-12-22 | Stop reason: HOSPADM

## 2023-12-19 RX ORDER — PANTOPRAZOLE SODIUM 40 MG/1
40 TABLET, DELAYED RELEASE ORAL DAILY
Status: DISCONTINUED | OUTPATIENT
Start: 2023-12-19 | End: 2023-12-22 | Stop reason: HOSPADM

## 2023-12-19 RX ORDER — HYDROMORPHONE HCL IN WATER/PF 6 MG/30 ML
0.2 PATIENT CONTROLLED ANALGESIA SYRINGE INTRAVENOUS
Status: DISCONTINUED | OUTPATIENT
Start: 2023-12-19 | End: 2023-12-20

## 2023-12-19 RX ORDER — EPINEPHRINE IN 0.9 % SOD CHLOR 5 MG/250ML
.01-.3 PLASTIC BAG, INJECTION (ML) INTRAVENOUS CONTINUOUS
Status: DISCONTINUED | OUTPATIENT
Start: 2023-12-19 | End: 2023-12-19 | Stop reason: HOSPADM

## 2023-12-19 RX ORDER — FAMOTIDINE 20 MG/1
20 TABLET, FILM COATED ORAL
Status: COMPLETED | OUTPATIENT
Start: 2023-12-19 | End: 2023-12-19

## 2023-12-19 RX ORDER — DEXMEDETOMIDINE HYDROCHLORIDE 4 UG/ML
.2-.7 INJECTION, SOLUTION INTRAVENOUS CONTINUOUS
Status: DISCONTINUED | OUTPATIENT
Start: 2023-12-19 | End: 2023-12-20

## 2023-12-19 RX ORDER — LIDOCAINE HYDROCHLORIDE 10 MG/ML
INJECTION, SOLUTION INFILTRATION; PERINEURAL
Status: COMPLETED | OUTPATIENT
Start: 2023-12-19 | End: 2023-12-19

## 2023-12-19 RX ORDER — PROTAMINE SULFATE 10 MG/ML
INJECTION, SOLUTION INTRAVENOUS PRN
Status: DISCONTINUED | OUTPATIENT
Start: 2023-12-19 | End: 2023-12-19

## 2023-12-19 RX ORDER — HEPARIN SODIUM 1000 [USP'U]/ML
INJECTION, SOLUTION INTRAVENOUS; SUBCUTANEOUS PRN
Status: DISCONTINUED | OUTPATIENT
Start: 2023-12-19 | End: 2023-12-19

## 2023-12-19 RX ORDER — DEXTROSE MONOHYDRATE 25 G/50ML
25-50 INJECTION, SOLUTION INTRAVENOUS
Status: DISCONTINUED | OUTPATIENT
Start: 2023-12-19 | End: 2023-12-22 | Stop reason: HOSPADM

## 2023-12-19 RX ORDER — BISACODYL 10 MG
10 SUPPOSITORY, RECTAL RECTAL DAILY PRN
Status: DISCONTINUED | OUTPATIENT
Start: 2023-12-19 | End: 2023-12-22 | Stop reason: HOSPADM

## 2023-12-19 RX ORDER — CALCIUM GLUCONATE 20 MG/ML
1 INJECTION, SOLUTION INTRAVENOUS
Status: DISCONTINUED | OUTPATIENT
Start: 2023-12-19 | End: 2023-12-22 | Stop reason: HOSPADM

## 2023-12-19 RX ORDER — OXYCODONE HYDROCHLORIDE 5 MG/1
5 TABLET ORAL EVERY 4 HOURS PRN
Status: DISCONTINUED | OUTPATIENT
Start: 2023-12-19 | End: 2023-12-22 | Stop reason: HOSPADM

## 2023-12-19 RX ORDER — AMOXICILLIN 250 MG
1 CAPSULE ORAL 2 TIMES DAILY
Status: DISCONTINUED | OUTPATIENT
Start: 2023-12-19 | End: 2023-12-22 | Stop reason: HOSPADM

## 2023-12-19 RX ORDER — PROCHLORPERAZINE MALEATE 5 MG
10 TABLET ORAL EVERY 6 HOURS PRN
Status: DISCONTINUED | OUTPATIENT
Start: 2023-12-19 | End: 2023-12-22 | Stop reason: HOSPADM

## 2023-12-19 RX ORDER — ACETAMINOPHEN 325 MG/1
975 TABLET ORAL ONCE
Status: COMPLETED | OUTPATIENT
Start: 2023-12-19 | End: 2023-12-19

## 2023-12-19 RX ORDER — ONDANSETRON 2 MG/ML
INJECTION INTRAMUSCULAR; INTRAVENOUS PRN
Status: DISCONTINUED | OUTPATIENT
Start: 2023-12-19 | End: 2023-12-19

## 2023-12-19 RX ORDER — NICOTINE POLACRILEX 4 MG
15-30 LOZENGE BUCCAL
Status: DISCONTINUED | OUTPATIENT
Start: 2023-12-19 | End: 2023-12-22 | Stop reason: HOSPADM

## 2023-12-19 RX ORDER — OXYCODONE HYDROCHLORIDE 5 MG/1
10 TABLET ORAL EVERY 4 HOURS PRN
Status: DISCONTINUED | OUTPATIENT
Start: 2023-12-19 | End: 2023-12-22 | Stop reason: HOSPADM

## 2023-12-19 RX ORDER — ACETAMINOPHEN 325 MG/1
650 TABLET ORAL EVERY 4 HOURS PRN
Status: DISCONTINUED | OUTPATIENT
Start: 2023-12-22 | End: 2023-12-19

## 2023-12-19 RX ORDER — ASPIRIN 81 MG/1
162 TABLET, CHEWABLE ORAL DAILY
Status: DISCONTINUED | OUTPATIENT
Start: 2023-12-19 | End: 2023-12-22 | Stop reason: HOSPADM

## 2023-12-19 RX ORDER — METHOCARBAMOL 750 MG/1
750 TABLET, FILM COATED ORAL EVERY 6 HOURS PRN
Status: DISCONTINUED | OUTPATIENT
Start: 2023-12-19 | End: 2023-12-22 | Stop reason: HOSPADM

## 2023-12-19 RX ORDER — PHENYLEPHRINE HCL IN 0.9% NACL 50MG/250ML
.1-4 PLASTIC BAG, INJECTION (ML) INTRAVENOUS CONTINUOUS PRN
Status: DISCONTINUED | OUTPATIENT
Start: 2023-12-19 | End: 2023-12-20

## 2023-12-19 RX ORDER — ASPIRIN 81 MG/1
81 TABLET, CHEWABLE ORAL
Status: COMPLETED | OUTPATIENT
Start: 2023-12-19 | End: 2023-12-19

## 2023-12-19 RX ORDER — ONDANSETRON 2 MG/ML
4 INJECTION INTRAMUSCULAR; INTRAVENOUS EVERY 6 HOURS PRN
Status: DISCONTINUED | OUTPATIENT
Start: 2023-12-19 | End: 2023-12-22 | Stop reason: HOSPADM

## 2023-12-19 RX ORDER — GABAPENTIN 300 MG/1
300 CAPSULE ORAL
Status: COMPLETED | OUTPATIENT
Start: 2023-12-19 | End: 2023-12-19

## 2023-12-19 RX ORDER — ASPIRIN 81 MG/1
162 TABLET, CHEWABLE ORAL
Status: COMPLETED | OUTPATIENT
Start: 2023-12-19 | End: 2023-12-19

## 2023-12-19 RX ORDER — NOREPINEPHRINE BITARTRATE 0.02 MG/ML
.01-.6 INJECTION, SOLUTION INTRAVENOUS CONTINUOUS
Status: DISCONTINUED | OUTPATIENT
Start: 2023-12-19 | End: 2023-12-21

## 2023-12-19 RX ORDER — CHLORHEXIDINE GLUCONATE ORAL RINSE 1.2 MG/ML
15 SOLUTION DENTAL EVERY 12 HOURS
Status: DISCONTINUED | OUTPATIENT
Start: 2023-12-19 | End: 2023-12-19

## 2023-12-19 RX ORDER — GABAPENTIN 100 MG/1
100 CAPSULE ORAL 3 TIMES DAILY
Status: DISCONTINUED | OUTPATIENT
Start: 2023-12-19 | End: 2023-12-22 | Stop reason: HOSPADM

## 2023-12-19 RX ADMIN — CLINDAMYCIN PHOSPHATE 900 MG: 900 INJECTION, SOLUTION INTRAVENOUS at 06:14

## 2023-12-19 RX ADMIN — CLINDAMYCIN PHOSPHATE 900 MG: 900 INJECTION, SOLUTION INTRAVENOUS at 12:14

## 2023-12-19 RX ADMIN — CALCIUM GLUCONATE 1 G: 20 INJECTION, SOLUTION INTRAVENOUS at 23:38

## 2023-12-19 RX ADMIN — VECURONIUM BROMIDE 2 MG: 1 INJECTION, POWDER, LYOPHILIZED, FOR SOLUTION INTRAVENOUS at 10:15

## 2023-12-19 RX ADMIN — ROCURONIUM BROMIDE 50 MG: 50 INJECTION, SOLUTION INTRAVENOUS at 07:52

## 2023-12-19 RX ADMIN — SODIUM CHLORIDE: 9 INJECTION, SOLUTION INTRAVENOUS at 08:11

## 2023-12-19 RX ADMIN — HEPARIN SODIUM 30000 UNITS: 1000 INJECTION, SOLUTION INTRAVENOUS; SUBCUTANEOUS at 10:16

## 2023-12-19 RX ADMIN — SODIUM CHLORIDE, POTASSIUM CHLORIDE, SODIUM LACTATE AND CALCIUM CHLORIDE 500 ML: 600; 310; 30; 20 INJECTION, SOLUTION INTRAVENOUS at 19:39

## 2023-12-19 RX ADMIN — CHLORHEXIDINE GLUCONATE 10 ML: 1.2 SOLUTION ORAL at 04:49

## 2023-12-19 RX ADMIN — NOREPINEPHRINE BITARTRATE 0.03 MCG/KG/MIN: 0.02 INJECTION, SOLUTION INTRAVENOUS at 14:25

## 2023-12-19 RX ADMIN — ONDANSETRON 4 MG: 2 INJECTION INTRAMUSCULAR; INTRAVENOUS at 13:03

## 2023-12-19 RX ADMIN — DOCUSATE SODIUM 50 MG AND SENNOSIDES 8.6 MG 1 TABLET: 8.6; 5 TABLET, FILM COATED ORAL at 21:48

## 2023-12-19 RX ADMIN — PROPOFOL 35 MCG/KG/MIN: 10 INJECTION, EMULSION INTRAVENOUS at 16:18

## 2023-12-19 RX ADMIN — FENTANYL CITRATE 250 MCG: 50 INJECTION INTRAMUSCULAR; INTRAVENOUS at 07:51

## 2023-12-19 RX ADMIN — SODIUM CHLORIDE, POTASSIUM CHLORIDE, SODIUM LACTATE AND CALCIUM CHLORIDE: 600; 310; 30; 20 INJECTION, SOLUTION INTRAVENOUS at 07:41

## 2023-12-19 RX ADMIN — VECURONIUM BROMIDE 3 MG: 1 INJECTION, POWDER, LYOPHILIZED, FOR SOLUTION INTRAVENOUS at 09:03

## 2023-12-19 RX ADMIN — FENTANYL CITRATE 100 MCG: 50 INJECTION INTRAMUSCULAR; INTRAVENOUS at 08:46

## 2023-12-19 RX ADMIN — ASPIRIN 81 MG CHEWABLE TABLET 162 MG: 81 TABLET CHEWABLE at 17:53

## 2023-12-19 RX ADMIN — FENTANYL CITRATE 50 MCG: 50 INJECTION INTRAMUSCULAR; INTRAVENOUS at 09:16

## 2023-12-19 RX ADMIN — PHENYLEPHRINE HYDROCHLORIDE 50 MCG: 10 INJECTION INTRAVENOUS at 09:29

## 2023-12-19 RX ADMIN — GABAPENTIN 100 MG: 100 CAPSULE ORAL at 21:48

## 2023-12-19 RX ADMIN — METOPROLOL TARTRATE 12.5 MG: 25 TABLET, FILM COATED ORAL at 04:50

## 2023-12-19 RX ADMIN — PHENYLEPHRINE HYDROCHLORIDE 100 MCG: 10 INJECTION INTRAVENOUS at 10:46

## 2023-12-19 RX ADMIN — ATORVASTATIN CALCIUM 40 MG: 40 TABLET, FILM COATED ORAL at 21:48

## 2023-12-19 RX ADMIN — LIDOCAINE HYDROCHLORIDE 2 ML: 10 INJECTION, SOLUTION INFILTRATION; PERINEURAL at 07:04

## 2023-12-19 RX ADMIN — PROPOFOL 30 MG: 10 INJECTION, EMULSION INTRAVENOUS at 12:43

## 2023-12-19 RX ADMIN — GLYCOPYRROLATE 0.2 MG: 0.2 INJECTION, SOLUTION INTRAMUSCULAR; INTRAVENOUS at 09:30

## 2023-12-19 RX ADMIN — NOREPINEPHRINE BITARTRATE 0.03 MCG/KG/MIN: 1 INJECTION, SOLUTION, CONCENTRATE INTRAVENOUS at 08:12

## 2023-12-19 RX ADMIN — SODIUM CHLORIDE: 9 INJECTION, SOLUTION INTRAVENOUS at 13:16

## 2023-12-19 RX ADMIN — ACETAMINOPHEN 975 MG: 325 TABLET, FILM COATED ORAL at 05:02

## 2023-12-19 RX ADMIN — PHENYLEPHRINE HYDROCHLORIDE 100 MCG: 10 INJECTION INTRAVENOUS at 08:02

## 2023-12-19 RX ADMIN — FENTANYL CITRATE 100 MCG: 50 INJECTION INTRAMUSCULAR; INTRAVENOUS at 13:19

## 2023-12-19 RX ADMIN — FENTANYL CITRATE 50 MCG: 50 INJECTION INTRAMUSCULAR; INTRAVENOUS at 12:43

## 2023-12-19 RX ADMIN — PHENYLEPHRINE HYDROCHLORIDE 100 MCG: 10 INJECTION INTRAVENOUS at 10:41

## 2023-12-19 RX ADMIN — ASPIRIN 81 MG CHEWABLE TABLET 81 MG: 81 TABLET CHEWABLE at 04:49

## 2023-12-19 RX ADMIN — Medication 40 MG: at 16:22

## 2023-12-19 RX ADMIN — SUGAMMADEX 200 MG: 100 INJECTION, SOLUTION INTRAVENOUS at 14:00

## 2023-12-19 RX ADMIN — VECURONIUM BROMIDE 2 MG: 1 INJECTION, POWDER, LYOPHILIZED, FOR SOLUTION INTRAVENOUS at 11:47

## 2023-12-19 RX ADMIN — MIDAZOLAM HYDROCHLORIDE 2 MG: 1 INJECTION, SOLUTION INTRAMUSCULAR; INTRAVENOUS at 07:48

## 2023-12-19 RX ADMIN — FENTANYL CITRATE 50 MCG: 50 INJECTION INTRAMUSCULAR; INTRAVENOUS at 09:12

## 2023-12-19 RX ADMIN — PHENYLEPHRINE HYDROCHLORIDE 100 MCG: 10 INJECTION INTRAVENOUS at 08:10

## 2023-12-19 RX ADMIN — Medication 0.02 MCG/KG/MIN: at 14:20

## 2023-12-19 RX ADMIN — HEPARIN SODIUM 3000 UNITS: 1000 INJECTION, SOLUTION INTRAVENOUS; SUBCUTANEOUS at 08:58

## 2023-12-19 RX ADMIN — NOREPINEPHRINE BITARTRATE 0.03 MCG/KG/MIN: 0.02 INJECTION, SOLUTION INTRAVENOUS at 23:25

## 2023-12-19 RX ADMIN — PROPOFOL 20 MG: 10 INJECTION, EMULSION INTRAVENOUS at 12:37

## 2023-12-19 RX ADMIN — PROPOFOL 50 MG: 10 INJECTION, EMULSION INTRAVENOUS at 07:51

## 2023-12-19 RX ADMIN — FENTANYL CITRATE 100 MCG: 50 INJECTION INTRAMUSCULAR; INTRAVENOUS at 08:49

## 2023-12-19 RX ADMIN — MIDAZOLAM HYDROCHLORIDE 2 MG: 1 INJECTION, SOLUTION INTRAMUSCULAR; INTRAVENOUS at 12:23

## 2023-12-19 RX ADMIN — AMINOCAPROIC ACID 59.38 MG/KG/HR: 250 INJECTION, SOLUTION INTRAVENOUS at 08:12

## 2023-12-19 RX ADMIN — GABAPENTIN 100 MG: 100 CAPSULE ORAL at 16:22

## 2023-12-19 RX ADMIN — ACETAMINOPHEN 650 MG: 325 TABLET, FILM COATED ORAL at 21:49

## 2023-12-19 RX ADMIN — MIDAZOLAM HYDROCHLORIDE 1 MG: 1 INJECTION, SOLUTION INTRAMUSCULAR; INTRAVENOUS at 10:30

## 2023-12-19 RX ADMIN — PROPOFOL 30 MCG/KG/MIN: 10 INJECTION, EMULSION INTRAVENOUS at 13:04

## 2023-12-19 RX ADMIN — NICARDIPINE HYDROCHLORIDE 0.5 MG/HR: 0.2 INJECTION INTRAVENOUS at 14:22

## 2023-12-19 RX ADMIN — CLINDAMYCIN PHOSPHATE 900 MG: 900 INJECTION, SOLUTION INTRAVENOUS at 20:01

## 2023-12-19 RX ADMIN — VECURONIUM BROMIDE 1 MG: 1 INJECTION, POWDER, LYOPHILIZED, FOR SOLUTION INTRAVENOUS at 10:47

## 2023-12-19 RX ADMIN — PROPOFOL 30 MCG/KG/MIN: 10 INJECTION, EMULSION INTRAVENOUS at 14:22

## 2023-12-19 RX ADMIN — FENTANYL CITRATE 50 MCG: 50 INJECTION INTRAMUSCULAR; INTRAVENOUS at 12:37

## 2023-12-19 RX ADMIN — INSULIN HUMAN 1 UNITS/HR: 1 INJECTION, SOLUTION INTRAVENOUS at 14:35

## 2023-12-19 RX ADMIN — GABAPENTIN 300 MG: 300 CAPSULE ORAL at 04:49

## 2023-12-19 RX ADMIN — FENTANYL CITRATE 100 MCG: 50 INJECTION INTRAMUSCULAR; INTRAVENOUS at 08:31

## 2023-12-19 RX ADMIN — SODIUM CHLORIDE, POTASSIUM CHLORIDE, SODIUM LACTATE AND CALCIUM CHLORIDE: 600; 310; 30; 20 INJECTION, SOLUTION INTRAVENOUS at 08:12

## 2023-12-19 RX ADMIN — PROTAMINE SULFATE 170 MG: 10 INJECTION, SOLUTION INTRAVENOUS at 12:38

## 2023-12-19 RX ADMIN — OXYCODONE HYDROCHLORIDE 5 MG: 5 TABLET ORAL at 17:52

## 2023-12-19 RX ADMIN — FAMOTIDINE 20 MG: 20 TABLET ORAL at 05:04

## 2023-12-19 RX ADMIN — EPINEPHRINE 0.03 MCG/KG/MIN: 1 INJECTION INTRAMUSCULAR; INTRAVENOUS; SUBCUTANEOUS at 12:24

## 2023-12-19 RX ADMIN — AMINOCAPROIC ACID 11.88 MG/KG/HR: 250 INJECTION, SOLUTION INTRAVENOUS at 09:02

## 2023-12-19 ASSESSMENT — ACTIVITIES OF DAILY LIVING (ADL)
ADLS_ACUITY_SCORE: 19
ADLS_ACUITY_SCORE: 19
ADLS_ACUITY_SCORE: 18
ADLS_ACUITY_SCORE: 19
ADLS_ACUITY_SCORE: 18
ADLS_ACUITY_SCORE: 19

## 2023-12-19 NOTE — PROGRESS NOTES
CVTS Brief Note    Patient rescheduled for surgery tomorrow morning at 0730 with Dr. Napoles. Preop orders in place under signed and held, heparin to be held on call to OR.   Patient updated by our surgical fellow.     Avril Michaels  Acute Care Nurse Practitioner   CVTS

## 2023-12-19 NOTE — ANESTHESIA PREPROCEDURE EVALUATION
Anesthesia Pre-Procedure Evaluation    Patient: Drew Bradford   MRN: [id number] : 1960        Procedure : Procedure(s):  Coronary artery bypass grafting, possible left radial artery harvest          Past Medical History:   Diagnosis Date    HTN (hypertension)     Hyperlipidemia LDL goal <100     Nocturia     Presbyopia     Seasonal allergies       Past Surgical History:   Procedure Laterality Date    COLONOSCOPY N/A 2018    Procedure: COLONOSCOPY;  colonoscopy;  Surgeon: Geremias Strickland MD;  Location:  GI    CV CORONARY ANGIOGRAM N/A 2023    Procedure: Coronary Angiogram;  Surgeon: Maverick Redmond MD;  Location:  HEART CARDIAC CATH LAB    ESOPHAGOSCOPY, GASTROSCOPY, DUODENOSCOPY (EGD), COMBINED N/A 2020    Procedure: ESOPHAGOGASTRODUODENOSCOPY, WITH BIOPSY;  Surgeon: Tez Vences MD;  Location:  GI    ZZC REMOVAL OF KIDNEY STONE        Allergies   Allergen Reactions    Cephalosporins Anaphylaxis    Penicillin G Anaphylaxis    Amoxicillin Cramps     Kidney problem    Fish Oil Hives    Shrimp Hives    Penicillins     Amoxicillin Rash     Back pain, kidney pain.     Misc. Sulfonamide Containing Compounds       Social History     Tobacco Use    Smoking status: Former    Smokeless tobacco: Never    Tobacco comments:     Quit 20 years ago   Substance Use Topics    Alcohol use: Yes     Alcohol/week: 2.0 standard drinks of alcohol     Types: 2 Cans of beer per week     Comment: 2 cans of beer a week      Wt Readings from Last 1 Encounters:   23 84.2 kg (185 lb 9.6 oz)        Anesthesia Evaluation            ROS/MED HX  ENT/Pulmonary:     (+) sleep apnea, uses CPAP,         allergic rhinitis,                            Neurologic:       Cardiovascular:     (+) Dyslipidemia hypertension- -  CAD angina-change in symptoms. past MI - -                                 Previous cardiac testing   Echo: Date: Results:  The left ventricle is normal in size.  Left ventricular systolic  function is mildly reduced.  The visual ejection fraction is 45-50%.  There are regional wall motion abnormalities as specified.  The right ventricle is normal size.  The right ventricular systolic function is normal.  Normal left atrial size.  The inferior vena cava was normal in size with preserved respiratory variability.  Stress Test:  Date: Results:    ECG Reviewed:  Date: Results:    Cath:  Date: Results:  Mid RCA lesion is 45% stenosed.  Acute Mrg lesion is 90% stenosed.  RPDA-1 lesion is 99% stenosed.  RPDA-2 lesion is 99% stenosed.  Ost LAD to Prox LAD lesion is 95% stenosed.  2nd Mrg lesion is 90% stenosed.     Severe multi-vessel coronary artery disease with 95% ostial stenosis of   the LAD, 90% mid-vessel stenosis of a large bifurcating OM2, and subtotal   99% serial stenoses of the RPDA.   Uncomplicated right radial access.     METS/Exercise Tolerance:     Hematologic:       Musculoskeletal:       GI/Hepatic:       Renal/Genitourinary:       Endo:       Psychiatric/Substance Use:       Infectious Disease:       Malignancy:       Other:            Physical Exam    Airway        Mallampati: II   TM distance: > 3 FB   Neck ROM: full   Mouth opening: > 3 cm    Respiratory Devices and Support         Dental  no notable dental history         Cardiovascular   cardiovascular exam normal          Pulmonary   pulmonary exam normal        breath sounds clear to auscultation           OUTSIDE LABS:  CBC:   Lab Results   Component Value Date    WBC 9.0 12/19/2023    WBC 7.3 12/18/2023    HGB 14.4 12/19/2023    HGB 14.8 12/18/2023    HCT 43.1 12/19/2023    HCT 43.3 12/18/2023     12/19/2023     12/18/2023     BMP:   Lab Results   Component Value Date     12/17/2023     12/16/2023    POTASSIUM 4.0 12/17/2023    POTASSIUM 3.9 12/16/2023    CHLORIDE 107 12/17/2023    CHLORIDE 108 (H) 12/16/2023    CO2 25 12/17/2023    CO2 26 12/16/2023    BUN 15.9 12/17/2023    BUN 10.1 12/16/2023    CR 0.92  "12/17/2023    CR 0.82 12/16/2023     (H) 12/17/2023     (H) 12/16/2023     COAGS: No results found for: \"PTT\", \"INR\", \"FIBR\"  POC: No results found for: \"BGM\", \"HCG\", \"HCGS\"  HEPATIC:   Lab Results   Component Value Date    ALBUMIN 3.9 12/16/2023    PROTTOTAL 6.5 12/16/2023    ALT 46 12/16/2023    AST 40 12/16/2023    ALKPHOS 46 12/16/2023    BILITOTAL 0.9 12/16/2023     OTHER:   Lab Results   Component Value Date    A1C 5.6 12/18/2023    STPEHEN 8.7 (L) 12/17/2023    TSH 2.24 08/18/2023       Anesthesia Plan    ASA Status:  4    NPO Status:  NPO Appropriate    Anesthesia Type: General.     - Airway: ETT   Induction: Intravenous.   Maintenance: Balanced.   Techniques and Equipment:     - Lines/Monitors: Arterial Line, Central Line, CVP     - Blood: Blood in Room     - Drips/Meds: Epinephrine, Norepi     Consents    Anesthesia Plan(s) and associated risks, benefits, and realistic alternatives discussed. Questions answered and patient/representative(s) expressed understanding.     - Discussed:     - Discussed with:  Patient,       - Extended Intubation/Ventilatory Support Discussed: No.      - Patient is DNR/DNI Status: No     Use of blood products discussed: Yes.     - Discussed with: Patient (via ).     - Consented: consented to blood products            Reason for refusal: other.     Postoperative Care    Pain management: IV analgesics, Oral pain medications, Multi-modal analgesia.   PONV prophylaxis: Ondansetron (or other 5HT-3), Dexamethasone or Solumedrol, Background Propofol Infusion     Comments:               Heriberto Reynoso, DO    I have reviewed the pertinent notes and labs in the chart from the past 30 days and (re)examined the patient.  Any updates or changes from those notes are reflected in this note.             "

## 2023-12-19 NOTE — ANESTHESIA PROCEDURE NOTES
Central Line/PA Catheter Placement    Pre-Procedure   Staff -        Anesthesiologist:  Heriberto Reynoso DO       Performed By: anesthesiologist       Location: OR       Pre-Anesthestic Checklist: patient identified, IV checked, site marked, risks and benefits discussed, informed consent, monitors and equipment checked, pre-op evaluation and at physician/surgeon's request  Timeout:       Correct Patient: Yes        Correct Procedure: Yes        Correct Site: Yes        Correct Position: Yes        Correct Laterality: Yes   Line Placement:   This line was placed Post Induction    Procedure   Procedure: central line       Laterality: right       Insertion Site: internal jugular, right.       Patient Position: Trendelenburg  Sterile Prep        All elements of maximal sterile barrier technique followed       Patient Prep/Sterile Barriers: draped, hand hygiene, gloves , hat , mask , draped, gown, sterile gel and probe cover       Skin prep: Chloraprep  Insertion/Injection        Technique: ultrasound guided and Seldinger Technique        1. Ultrasound was used to evaluate the access site.       2. Vein evaluated via ultrasound for patency/adequacy.       3. Using real-time ultrasound the needle/catheter was observed entering the artery/vein.       4. Permanent image was captured and entered into the patient's record.       5. The visualized structures were anatomically normal.       6. There were no apparent abnormal pathologic findings.       Introducer Type: 9 Fr, 10 cm        Type: Introducer  Narrative         Secured by: suture       Tegaderm and Biopatch dressing used.       blood aspirated from all lumens,        All lumens flushed: Yes       Verification method: GISEL (J wire visible in right caval-atrial junction in the mid-esophageal bicaval view on GISEL.  Image Saved.)   Comments:  The patient was induced and put under general anesthesia. Airway was secured as documented. The GISEL probe was lubricated and  carefully inserted. The right neck was prepped with 2% chlorhexidine and the patient was draped with a full length sterile sheet in the usual fashion. The right internal jugular vein was accessed with ultrasound guidance using an 18 gauge thin wall needle, a 1.75 inch catheter was advanced over the needle and the needle was removed. A wire was then advanced through the catheter. The J wire tip was visible in right caval-atrial junction in the mid-esophageal bicaval view on GISEL.  Image was acquired. Once this was verified, the catheter was removed. After a small skin nick with scalpel, a 9 Maori introducer was advanced over the wire via the seldinger technique. Blood was withdrawn from all lumens and flushed with normal saline.  The catheter was sutured in place and a sterile dressing was applied over the site prior to removal of drapes. This was atraumatic and there were no complications.

## 2023-12-19 NOTE — ANESTHESIA PROCEDURE NOTES
Perioperative GISEL Procedure Note    Staff -        Performed By: anesthesiologist  Preanesthesia Checklist:  Patient identified, IV assessed, risks and benefits discussed, monitors and equipment assessed, procedure being performed at surgeon's request and anesthesia consent obtained.    GISEL Probe Insertion    Probe Status PRE Insertion: NO obvious damage  Probe type:  Adult 3D  Bite block used:   Soft  Insertion Technique: Easy, no oropharyngeal manipulation  Insertion complications: None obvious  Billing Report:A GISEL report is NOT being generated.  Probe Status POST Removal: NO obvious damage

## 2023-12-19 NOTE — CONSULTS
"Critical Care Services Progress Note:  I have evaluated all laboratory values and imaging studies from the past 24 hours.  We are consulted by Dr. Napoles of the CV surgery service for management post-procedure.  CC:  s/p cab3 (including radial artery graft)  HPI: 62 yo mandarin speaking male h/o HTN and AWA presented to the ED 12/15 with chest pain and anginal symptoms, cath showed multivessel disease.  Taken for CAB3 on 12/18.  Case uneventful per cv surgery and anesthesiology report.  EF 40-50% stable throughout the case. Got cellsaver, no other product during case.  The patient is intubated and sedated which precludes direct history taking.  PMH/PSH/meds/all/SH/FH reviewed and as noted below.  ROS:  Unable as pt is intubated post procedure.  Exam:  /74   Pulse (!) 46   Temp 98  F (36.7  C) (Temporal)   Resp 20   Ht 1.75 m (5' 8.9\")   Wt 84.2 kg (185 lb 9.6 oz)   SpO2 100%   BMI 27.49 kg/m    GEN: no acute distress, intubated/sedated  HEENT: head ncat, sclera anicteric, OP patent, trachea midline, PERRL  PULM: unlabored synchronous with vent, clear anteriorly    CV/COR: RRR S1S2 no gallop,  No rub, no murmur.  Midline incision dressed, c/d/i.  ABD: soft nontender, hypoactive bowel sounds, no mass  EXT:   No Edema,   Warm x4  NEURO: sedated but no gross deficit apparent  SKIN: no obvious rash  LINES: clean, dry intact    Data  Labs (personally reviewed/interpreted): lytes ok, creat 0.93 acceptable.  Leukocytosis to 21.  Hgb 11.0--acute blood loss anemia appropriate to procedure.  Pltlts 153 acceptable.  Abg ok 7.37/36/151.    EKG (personally reviewed/interpreted): deferred as pt is 100% av paced  CXR (personally reviewed/interpreted): line in good position, ET tube high--advanced 2 cm.  OG coiled in esophagus--removed.  Lungs clear.    Assessment/plan:  63 year old male s/p cab3.    Neurology/Psychiatry:   1. Analgesia -- tylenol, prn narcotics  2.  Anxiety -- wean sedative drips to extubate.  No acute " anxiety needs at this time.    Cardiovascular:   1. Shock, post-procedure, vasoplegic vs unspecified -  Most likely due to vasoplegia which would be expected after a procedure of this magnitude. Continue phenylephrine and epinephrine.  2.  S/p CAB -- ASA  3.  Radial artery graft:  continue low dose nicardipine drip    Pulmonary/Ventilator Management:   1. Airway -- intubated post-op.  Weaning to extubate  2. No underlying pulmonary problems on medical history or cxr.     GI and Nutrition :   1. Diet when cleared by CV surgery   2. Acid suppression for PUD prophy    Renal/Fluids/Electrolytes:   1. Monitor UOP/creatinine post-op.  2. Replete electrolytes prn  3. IVF and albumin administration for volume prn per protocol    Infectious Disease:   1. Prophylactic abx post-op per CV surgery    Endocrine:   1. Monitor for stress induced hyperglycemia. ICU insulin protocol, goal sugar <180     Hematology/Oncology:   1. Hgb/pltlts stable post op.  pRBC for goal hgb 8.0 or as indicated by CV surgery     IV/Access:   1. Venous access - Central access from OR  2. Arterial access - remove A-line when off vasoactives and extubated    ICU Prophylaxis:   1. DVT: Hep Subq/mechanical  2. VAP: HOB 30 degrees, chlorhexidine rinse  3. Stress Ulcer: PPI/H2 blocker  4. Restraints: Nonviolent soft two point restraints required and necessary for patient safety and continued cares and good effect as patient continues to pull at necessary lines, tubes despite education and distraction. Will readdress daily.   5. IV Access - central access required and necessary for continued patient cares  6. Disposition - ICU    D/w Dr Napoles of cv surg and Dr. Edgardo MORAN-WINNIE  I spent a total of 45 minutes (excluding procedure time) personally providing and directing critical care services at the bedside and on the critical care unit for this patient.     Jason Durbin     Clinically Significant Risk Factors               # Coagulation Defect: INR = 1.32 (Ref  "range: 0.85 - 1.15) and/or PTT = 29 Seconds (Ref range: 22 - 38 Seconds), will monitor for bleeding    # Hypertension: Noted on problem list        # Overweight: Estimated body mass index is 27.49 kg/m  as calculated from the following:    Height as of this encounter: 1.75 m (5' 8.9\").    Weight as of this encounter: 84.2 kg (185 lb 9.6 oz)., PRESENT ON ADMISSION                   PMH:  Past Medical History:   Diagnosis Date    HTN (hypertension)     Hyperlipidemia LDL goal <100     Nocturia     Presbyopia     Seasonal allergies        PSH:  Past Surgical History:   Procedure Laterality Date    COLONOSCOPY N/A 7/16/2018    Procedure: COLONOSCOPY;  colonoscopy;  Surgeon: Geremias Strickland MD;  Location:  GI    CV CORONARY ANGIOGRAM N/A 12/16/2023    Procedure: Coronary Angiogram;  Surgeon: Maverick Redmond MD;  Location:  [facility]    ESOPHAGOSCOPY, GASTROSCOPY, DUODENOSCOPY (EGD), COMBINED N/A 1/14/2020    Procedure: ESOPHAGOGASTRODUODENOSCOPY, WITH BIOPSY;  Surgeon: Tez Vences MD;  Location:  GI    ZZC REMOVAL OF KIDNEY STONE         Meds:  Current Facility-Administered Medications   Medication    acetaminophen (TYLENOL) tablet 650 mg    Or    acetaminophen (TYLENOL) Suppository 650 mg    [START ON 12/22/2023] acetaminophen (TYLENOL) tablet 650 mg    acetaminophen (TYLENOL) tablet 975 mg    amLODIPine (NORVASC) tablet 5 mg    aspirin (ASA) chewable tablet 162 mg    aspirin (ASA) chewable tablet 81 mg    atorvastatin (LIPITOR) tablet 40 mg    bisacodyl (DULCOLAX) suppository 10 mg    calcium carbonate (TUMS) chewable tablet 1,000 mg    calcium gluconate 1 g in 50 mL in sodium chloride intermittent infusion    calcium gluconate 2 g in  mL intermittent infusion    calcium gluconate 3 g in sodium chloride 0.9 % 100 mL intermittent infusion    chlorhexidine (PERIDEX) 0.12 % solution 15 mL    clindamycin (CLEOCIN) 900 mg in 50 mL D5W intermittent infusion    dexmedeTOMIDine (PRECEDEX) " 4 mcg/mL in sodium chloride 0.9 % 100 mL infusion    glucose gel 15-30 g    Or    dextrose 50 % injection 25-50 mL    Or    glucagon injection 1 mg    EPINEPHrine (ADRENALIN) 5 mg in  mL infusion    gabapentin (NEURONTIN) capsule 100 mg    [START ON 12/20/2023] heparin ANTICOAGULANT injection 5,000 Units    heparin infusion 25,000 units in D5W 250 mL ANTICOAGULANT    HOLD:  Metformin and metformin containing medications if patient received IV contrast with acute kidney injury or severe chronic kidney disease (stage IV or stage V; i.e., eGFR less than 30)    hydrALAZINE (APRESOLINE) injection 10 mg    hydrALAZINE (APRESOLINE) tablet 10 mg    Or    hydrALAZINE (APRESOLINE) injection 10 mg    HYDROmorphone (DILAUDID) injection 0.2 mg    Or    HYDROmorphone (DILAUDID) injection 0.4 mg    insulin regular (MYXREDLIN) 1 unit/mL infusion    isosorbide mononitrate (IMDUR) 24 hr half-tab 15 mg    lactated ringers BOLUS 250 mL    lidocaine (LMX4) cream    lidocaine 1 % 0.1-1 mL    [Held by provider] lisinopril (ZESTRIL) tablet 2.5 mg    LORazepam (ATIVAN) injection 0.5 mg    Or    LORazepam (ATIVAN) tablet 0.5 mg    magnesium hydroxide (MILK OF MAGNESIA) suspension 30 mL    propofol (DIPRIVAN) infusion    And    propofol (DIPRIVAN) bolus from bag or syringe pump    And    Medication Instruction    medication instruction    methocarbamol (ROBAXIN) tablet 750 mg    metoprolol tartrate (LOPRESSOR) half-tab 12.5 mg    morphine (PF) injection 1 mg    naloxone (NARCAN) injection 0.2 mg    Or    naloxone (NARCAN) injection 0.4 mg    Or    naloxone (NARCAN) injection 0.2 mg    Or    naloxone (NARCAN) injection 0.4 mg    niCARdipine 40 mg in 200 mL NS (CARDENE) infusion    nitroGLYcerin (NITROSTAT) sublingual tablet 0.4 mg    norepinephrine (LEVOPHED) 16 mg in  mL infusion MAX CONC CENTRAL LINE    ondansetron (ZOFRAN ODT) ODT tab 4 mg    Or    ondansetron (ZOFRAN) injection 4 mg    ondansetron (ZOFRAN ODT) ODT tab 4 mg     Or    ondansetron (ZOFRAN) injection 4 mg    oxyCODONE (ROXICODONE) tablet 5 mg    Or    oxyCODONE (ROXICODONE) tablet 10 mg    oxyCODONE (ROXICODONE) tablet 5 mg    Or    oxyCODONE (ROXICODONE) tablet 10 mg    pantoprazole (PROTONIX) 2 mg/mL suspension 40 mg    Or    pantoprazole (PROTONIX) EC tablet 40 mg    Patient is already receiving anticoagulation with heparin, enoxaparin (LOVENOX), warfarin (COUMADIN)  or other anticoagulant medication    phenylephrine (GAEL-SYNEPHRINE) 50 mg in NaCl 0.9 % 250 mL infusion    [START ON 12/20/2023] polyethylene glycol (MIRALAX) Packet 17 g    polyethylene glycol (MIRALAX) Packet 17 g    prochlorperazine (COMPAZINE) injection 10 mg    Or    prochlorperazine (COMPAZINE) tablet 10 mg    prochlorperazine (COMPAZINE) injection 10 mg    Or    prochlorperazine (COMPAZINE) tablet 10 mg    Or    prochlorperazine (COMPAZINE) suppository 25 mg    Reason ACE/ARB/ARNI order not selected    Reason beta blocker order not selected    senna-docusate (SENOKOT-S/PERICOLACE) 8.6-50 MG per tablet 1 tablet    senna-docusate (SENOKOT-S/PERICOLACE) 8.6-50 MG per tablet 1 tablet    Or    senna-docusate (SENOKOT-S/PERICOLACE) 8.6-50 MG per tablet 2 tablet    senna-docusate (SENOKOT-S/PERICOLACE) 8.6-50 MG per tablet 1 tablet    Or    senna-docusate (SENOKOT-S/PERICOLACE) 8.6-50 MG per tablet 2 tablet    sodium chloride (PF) 0.9% PF flush 3 mL    sodium chloride (PF) 0.9% PF flush 3 mL    sodium chloride (PF) 0.9% PF flush 3 mL    sodium chloride (PF) 0.9% PF flush 3 mL       Allergies:  Allergies   Allergen Reactions    Cephalosporins Anaphylaxis    Penicillin G Anaphylaxis    Amoxicillin Cramps     Kidney problem    Fish Oil Hives    Shrimp Hives    Penicillins     Amoxicillin Rash     Back pain, kidney pain.     Misc. Sulfonamide Containing Compounds        Social Hx:  Social History     Socioeconomic History    Marital status:      Spouse name: Not on file    Number of children: Not on file     Years of education: Not on file    Highest education level: Not on file   Occupational History    Not on file   Tobacco Use    Smoking status: Former    Smokeless tobacco: Never    Tobacco comments:     Quit 20 years ago   Vaping Use    Vaping Use: Never used   Substance and Sexual Activity    Alcohol use: Yes     Alcohol/week: 2.0 standard drinks of alcohol     Types: 2 Cans of beer per week     Comment: 2 cans of beer a week    Drug use: Never    Sexual activity: Yes     Partners: Female   Other Topics Concern    Parent/sibling w/ CABG, MI or angioplasty before 65F 55M? Not Asked   Social History Narrative    ** Merged History Encounter **          Social Determinants of Health     Financial Resource Strain: Not on file   Food Insecurity: Not on file   Transportation Needs: Not on file   Physical Activity: Not on file   Stress: Not on file   Social Connections: Not on file   Interpersonal Safety: Not on file   Housing Stability: Not on file       Family Hx:  Family History   Problem Relation Age of Onset    No Known Problems Mother     No Known Problems Father

## 2023-12-19 NOTE — ANESTHESIA PROCEDURE NOTES
Airway       Patient location during procedure: OR       Procedure Start/Stop Times: 12/19/2023 7:55 AM  Staff -        Anesthesiologist:  Heriberto Reynoso DO       CRNA: Terri Lew APRN CRNA       Performed By: CRNA  Consent for Airway        Urgency: elective  Indications and Patient Condition       Indications for airway management: carson-procedural       Induction type:intravenous       Mask difficulty assessment: 2 - vent by mask + OA or adjuvant +/- NMBA    Final Airway Details       Final airway type: endotracheal airway       Successful airway: ETT - single  Endotracheal Airway Details        ETT size (mm): 8.0       Successful intubation technique: video laryngoscopy       VL Blade Size: Mendoza 4       Grade View of Cords: 1       Adjucts: stylet       Position: Left       Measured from: lips       Secured at (cm): 22       Bite block used: None    Post intubation assessment        Placement verified by: capnometry, equal breath sounds and chest rise        Number of attempts at approach: 1       Number of other approaches attempted: 0       Secured with: tape       Ease of procedure: easy       Dentition: Intact and Unchanged    Medication(s) Administered   Medication Administration Time: 12/19/2023 7:55 AM

## 2023-12-19 NOTE — PROGRESS NOTES
Tube advanced 2 cm per MD order, now located 24 cm at patient's lips    Rex Ramirez, RT on 12/19/2023 at 2:58 PM

## 2023-12-19 NOTE — ANESTHESIA POSTPROCEDURE EVALUATION
Patient: Drew Bradford    Procedure: Procedure(s):  CORONARY ARTERY BYPASS GRAFT x 3 (LEFT INTERNAL MAMMARY ARTERY - LEFT ANTERIOR DESCENDING ARTERY; RADIAL ARTERY - OBTUSE MARGIN; SAPHENOUS VEIN - POSTERIOR DESCENDING ARTERY), ENDOSCOPIC SAPHENOUS VEIN HARVEST ON THE LEFT LOWER EXTREMITY, ENDOSCOPIC RADIAL ARTERY HARVEST ON THE LEFT UPPER EXTREMITY, AND ON CARDIOPULMONARY PUMP OXYGENATOR    (INTRAOPERATIVE TRANSESOPHAGEAL ECHOCARDIOGRAM BY ANESTHESIOLOGIST)       Anesthesia Type:  General    Note:  Disposition: Inpatient; ICU            ICU Sign Out: Anesthesiologist/ICU physician sign out WAS performed   Postop Pain Control: Uneventful            Sign Out: Patient sedated and intubated.   PONV: No   Neuro/Psych: Uneventful            Sign Out: PLANNED postop sedation   Airway/Respiratory: Uneventful            Sign Out: AIRWAY IN SITU/Resp. Support               Airway in situ/Resp. Support: ETT                 Reason: Planned Pre-op   CV/Hemodynamics: Uneventful            Sign Out: Acceptable CV status; No obvious hypovolemia; No obvious fluid overload   Other NRE: NONE   DID A NON-ROUTINE EVENT OCCUR? No           Last vitals:  Vitals:    12/19/23 1520 12/19/23 1530 12/19/23 1600   BP:  104/63 112/68   Pulse: 80 80 80   Resp: 15 15 15   Temp: (!) 35.6  C (96.1  F) (!) 35.7  C (96.3  F) (!) 35.8  C (96.4  F)   SpO2: 100% 100% 100%       Electronically Signed By: Heriberto Reynoso DO  December 19, 2023  4:24 PM

## 2023-12-19 NOTE — CONSULTS
CARDIAC SURGERY NUTRITION CONSULT    Received standing order to assess and educate patient.    Will follow and complete assessment once patient is extubated and/or is transferred to medical unit.    Patient will receive nutrition education during the Outpatient Cardiac Rehab Program (nutrition classes/dietitian counseling).    Courtney Dumas RD, LD, [facility]   Clinical Dietitian - [facility]

## 2023-12-19 NOTE — OP NOTE
DATE OF SERVICE:  12/19/23     PREOPERATIVE DIAGNOSES:  1.  Severe multivessel coronary artery disease.  2.  Hypertension  3.  Obstructive sleep apnea  4.  Non ST elevation myocardial infarction     POSTOPERATIVE DIAGNOSES:  1.  Severe multivessel coronary artery disease.  2.  Hypertension  3.  Obstructive sleep apnea  4.  Non ST elevation myocardial infarction     PROCEDURE PERFORMED:  1.  Coronary artery bypass grafting x 3   -reversed saphenous vein graft to the right posterior descending coronary artery  -radial artery graft to the obtuse marginal branch of the left circumflex coronary artery  -in-situ left internal mammary artery to the left anterior descending coronary artery.  2.  Endoscopic harvest of the greater saphenous vein from the left lower extremity.  3.  Endoscopic harvest of the radial artery from the left upper extremity  4.  Epiaortic ultrasonography    SURGEON:  MICHAEL Napoles MD     RESIDENT/FELLOW SURGEON:  Shreyas Valdes MD.    SECOND ASSISTANT: Harvey Lewis PA-C     ANESTHESIA:  General endotracheal anesthesia.     ESTIMATED BLOOD LOSS:  500 mL     SPECIMEN:  None.    CPB/XC:  102 / 91 minutes     INDICATIONS FOR PROCEDURE:  Drew Bradford is a 63-year-old male who presented with an NSTEMI. Coronary angiography demonstrated severe multivessel coronary artery disease. Echocardiography demonstrated mildly reduced left ventricular function and no significant valvular abnormality. The patient understood the risks and benefits of the procedure and wished to undergo the operation.     OPERATIVE FINDINGS:      The left internal mammary artery was 2 mm in diameter and had excellent flow.  The greater saphenous vein from the left lower extremity was 2 - 3 mm in diameter and a suitable conduit for bypass.    The radial artery from the left upper extremity was 2 - 3 mm in diameter and a suitable conduit for bypass    The ascending aorta was free of calcified plaque.     The right posterior descending  coronary artery was 2 mm in diameter and free of disease at the site of anastomosis.   The obtuse marginal branch of the left circumflex coronary artery was 2.25 mm in diameter and free of disease at the site of anastomosis.   The left anterior descending coronary artery was 2 mm in diameter and free of disease at the site of anastomosis.    After reperfusion and defibrillation, sinus rhythm was restored.    Left ventricular function was normal preoperatively and unchanged after bypass on low-dose inotropic support.     OPERATIVE DESCRIPTION IN DETAIL:  After obtaining informed consent, the patient was brought to the operating room and placed in the supine position on the operating room table. Appropriate lines and devices for monitoring were placed by the anesthesia team.  The patient was prepped and draped, and a timeout was performed to confirm the correct patient identity, as well as the procedure to be performed. A median sternotomy was performed and the left internal mammary artery was harvested in a pedicled fashion. The greater saphenous vein was harvested from the left lower extremity and the radial artery harvested from the left upper extremity using endoscopic harvesting by the physician assistant, Harvey Lewis PA-C.     The patient was given full dose heparin to achieve an activated clotting time over 480 seconds.  Following this, the distal ascending aorta and the right atrium was cannulated. Next, both retrograde and antegrade cardioplegia cannulae were placed. Cardiopulmonary bypass was commenced. Cardioplegia was created. The vein conduit and targets were inspected. The heart was arrested with 1 liter of cold blood cardioplegia antegrade, followed by 200 ml retrograde. Subsequent maintenance doses of 300 mL of cold retrograde blood cardioplegia were given approximately every 20 minutes or after each distal anastomosis. Electrochemical silence was observed. Topical cold saline slush was applied for  additional protection.     The following grafts were constructed:    Reversed saphenous vein graft to the proximal right posterior descending coronary artery in an end-to-side fashion  Radial artery graft to the obtuse marginal branch of the left circumflex coronary artery in an end-to-side fashion  In-situ left internal mammary artery to the mid left anterior descending coronary artery in an end-to-side fashion    The proximal anastomoses of the vein and radial grafts were constructed on the ascending aorta in an end-to-side fashion using running 6-0 Prolene under a single crossclamp.      The crossclamp was released after a retrograde dose of terminal warm blood cardioplegia was delivered, and the heart was resuscitated. All anastomoses were inspected and determined to be hemostatic. A ventricular pacing lead was placed and brought out through a separate stab incision. Ventilation was resumed. The patient weaned from cardiopulmonary bypass, was given protamine and decannulated. All cannulation sites were oversewn. A 28-Cuban angled chest tube was placed in the left pleural space and two 32-Cuban chest tubes were placed in the anterior mediastinum and brought out through separate stab incisions. The sternal edges were reapposed with 3 interrupted #6 stainless steel sternal wires in the manubrium, 3 double wires in the body of the sternum and 1 additional #6 stainless steel sternal wire at the lower aspect of the sternum. The skin and soft tissues were closed in multiple layers of running absorbable suture.    All sponge, instrument, and needle counts were correct at the end of the case.      MICHAEL Napoles MD  Cardiothoracic Surgery  Pager [phone]

## 2023-12-19 NOTE — PLAN OF CARE
Goal Outcome Evaluation:      Primary Diagnosis: NSTEMI (non-ST elevated myocardial infarction) (H)  Inpatient Status: Cardiac      Date/time: 12/18/23 9015-7321  Orientation A&O X4   needed yes. Also pt is using [email or web address] adrian   Procedures This Admit:  CAGB scheduled for tomorrow 12/19. Shower completed tonight   Drips:  Hep  1000 units/hr  Drains & Devices:  PIV infusing heparin  Rhythm:   Sinus price  Activity Level: Ind  Oxygen needs: N/A  Important Labs Since Admit: N/A   Significant Echo results:   Anticipated D/C Date: TBD

## 2023-12-19 NOTE — PROGRESS NOTES
Drew Bradford is a 63 year old Mandarin-speaking male with PMHx of hypertension and AWA on CPAP who was admitted on 12/15/2023 with an NSTEMI  Scheduled for CABG surgery morning at 0730 with Dr. Napoles on 12/19.  Postprocedure patient will go to ICU.    Defer care on 12/19 to surgical team/intensivist as patient intubated.  Rounding hospitalist will follow on 12/20.  Please page for questions.

## 2023-12-19 NOTE — ANESTHESIA PROCEDURE NOTES
Arterial Line Procedure Note    Pre-Procedure   Staff -        Anesthesiologist:  Heriberto Reynoso DO       Performed By: anesthesiologist       Location: pre-op       Pre-Anesthestic Checklist: patient identified, IV checked, site marked, risks and benefits discussed, informed consent, monitors and equipment checked, pre-op evaluation and at physician/surgeon's request  Timeout:       Correct Patient: Yes        Correct Procedure: Yes        Correct Site: Yes        Correct Position: Yes   Line Placement:   This line was placed Pre Induction starting at 12/19/2023 7:04 AM and ending at 12/19/2023 7:08 AM  Procedure   Procedure: arterial line       Laterality: left       Insertion Site: radial.  Sterile Prep        All elements of maximal sterile barrier technique followed       Patient Prep/Sterile Barriers: hand hygiene, sterile gloves, hat, mask, draped, draped       Skin prep: Chloraprep  Insertion/Injection        Technique: Seldinger Technique and ultrasound guided        1. Ultrasound was used to evaluate the access site.       2. Artery evaluated via ultrasound for patency/adequacy.       3. Using real-time ultrasound the needle/catheter was observed entering the artery/vein.       4. Permanent image was captured and entered into the patient's record.       5. The visualized structures were anatomically normal.       6. There were no apparent abnormal pathologic findings.       Catheter Type/Size: 20 gauge, 1.75 in/4.5 cm quick cath (integral wire)  Narrative        Tegaderm dressing used.       Complications: None apparent,        Arterial waveform: Yes        IBP within 10% of NIBP: Yes

## 2023-12-19 NOTE — PLAN OF CARE
Neuro- A&Ox4  Most Recent Vitals- Temp: 97.8  F (36.6  C) Temp src: Oral BP: 106/69 Pulse: 58   Resp: 18 SpO2: 100 % O2 Device: None (Room air)   Tele/Cardiac- SB, denies CP  Resp- Stable on RA, LS clear, denies SOB  Activity- independent  Pain- denies  Drips- Heparin  Drains/Tubes- PIV  Skin- R radial site-CMS intact  GI/- WDL  Aggression Color- Green  COVID status- Negative  Plan- 1st case CABG 12/19  Misc- Pt is Mandarin speaking, uses google translate to communicate    Eleni Magaña RN

## 2023-12-19 NOTE — ANESTHESIA CARE TRANSFER NOTE
Patient: Drew Bradford    Procedure: Procedure(s):  CORONARY ARTERY BYPASS GRAFT x 3 (LEFT INTERNAL MAMMARY ARTERY - LEFT ANTERIOR DESCENDING ARTERY; RADIAL ARTERY - OBTUSE MARGIN; SAPHENOUS VEIN - POSTERIOR DESCENDING ARTERY), ENDOSCOPIC SAPHENOUS VEIN HARVEST ON THE LEFT LOWER EXTREMITY, ENDOSCOPIC RADIAL ARTERY HARVEST ON THE LEFT UPPER EXTREMITY, AND ON CARDIOPULMONARY PUMP OXYGENATOR    (INTRAOPERATIVE TRANSESOPHAGEAL ECHOCARDIOGRAM BY ANESTHESIOLOGIST)       Diagnosis: Coronary arteriosclerosis [I25.10]  Diagnosis Additional Information: No value filed.    Anesthesia Type:   General     Note:    Oropharynx: endotracheal tube in place  Level of Consciousness: iatrogenic sedation  Patient oxygen source: manually ventilated via ambu bag.  Level of Supplemental Oxygen (L/min / FiO2): 15  Independent Airway: airway patency not satisfactory and stable  Dentition: dentition unchanged  Vital Signs Stable: post-procedure vital signs reviewed and stable  Report to RN Given: handoff report given  Patient transferred to: ICU    ICU Handoff: Call for PAUSE to initiate/utilize ICU HANDOFF, Identified Patient, Identified Responsible Provider, Reviewed the Pertinent Medical History, Discussed Surgical Course, Reviewed Intra-OP Anesthesia Management and Issues during Anesthesia, Set Expectations for Post Procedure Period and Allowed Opportunity for Questions and Acknowledgement of Understanding      Vitals:  Vitals Value Taken Time   BP     Temp 35.7  C (96.26  F) 12/19/23 1406   Pulse 80 12/19/23 1406   Resp 18 12/19/23 1406   SpO2 100 % 12/19/23 1406   Vitals shown include unfiled device data.    Electronically Signed By: DIAMANTE Owens CRNA  December 19, 2023  2:07 PM

## 2023-12-19 NOTE — PROCEDURES
[facility]    Arterial line placement    Date/Time: 12/19/2023 3:18 PM    Performed by: Jason Durbin MD  Authorized by: Jason Durbin MD      UNIVERSAL PROTOCOL   Site Marked: NA  Prior Images Obtained and Reviewed:  NA  Required items: Required blood products, implants, devices and special equipment available    Patient identity confirmed:  Arm band  NA - No sedation, light sedation, or local anesthesia  Confirmation Checklist:  Patient's identity using two indicators, relevant allergies, procedure was appropriate and matched the consent or emergent situation and correct equipment/implants were available  Time out: Immediately prior to the procedure a time out was called    Universal Protocol: the Joint Commission Universal Protocol was followed    Preparation: Patient was prepped and draped in usual sterile fashion    Indication:  Vasoplegic shock  Location:  Right radial      SEDATION    Patient Sedated: No      PROCEDURE DETAILS    Needle Gauge:  22  Seldinger technique: Seldinger technique used    Number of Attempts:  1  Post-procedure:  Line sutured and dressing applied  CMS: normal    PROCEDURE  Describe Procedure: Prior brachial A-line had malfunctioned necessitating new line placement.  Patient Tolerance:  Patient tolerated the procedure well with no immediate complications  Length of time physician/provider present for 1:1 monitoring during sedation: 0

## 2023-12-20 ENCOUNTER — APPOINTMENT (OUTPATIENT)
Dept: OCCUPATIONAL THERAPY | Facility: CLINIC | Age: 63
End: 2023-12-20
Attending: PHYSICIAN ASSISTANT
Payer: COMMERCIAL

## 2023-12-20 ENCOUNTER — APPOINTMENT (OUTPATIENT)
Dept: GENERAL RADIOLOGY | Facility: CLINIC | Age: 63
End: 2023-12-20
Attending: SURGERY
Payer: COMMERCIAL

## 2023-12-20 LAB
ALBUMIN SERPL BCG-MCNC: 3.2 G/DL (ref 3.5–5.2)
ALP SERPL-CCNC: 30 U/L (ref 40–150)
ALT SERPL W P-5'-P-CCNC: 60 U/L (ref 0–70)
ANION GAP SERPL CALCULATED.3IONS-SCNC: 5 MMOL/L (ref 7–15)
AST SERPL W P-5'-P-CCNC: 57 U/L (ref 0–45)
BILIRUB SERPL-MCNC: 0.6 MG/DL
BUN SERPL-MCNC: 14.2 MG/DL (ref 8–23)
CA-I BLD-MCNC: 4.5 MG/DL (ref 4.4–5.2)
CALCIUM SERPL-MCNC: 7.8 MG/DL (ref 8.8–10.2)
CHLORIDE SERPL-SCNC: 108 MMOL/L (ref 98–107)
CREAT SERPL-MCNC: 0.88 MG/DL (ref 0.67–1.17)
DEPRECATED HCO3 PLAS-SCNC: 26 MMOL/L (ref 22–29)
EGFRCR SERPLBLD CKD-EPI 2021: >90 ML/MIN/1.73M2
ERYTHROCYTE [DISTWIDTH] IN BLOOD BY AUTOMATED COUNT: 11.8 % (ref 10–15)
GLUCOSE BLDC GLUCOMTR-MCNC: 107 MG/DL (ref 70–99)
GLUCOSE BLDC GLUCOMTR-MCNC: 111 MG/DL (ref 70–99)
GLUCOSE BLDC GLUCOMTR-MCNC: 111 MG/DL (ref 70–99)
GLUCOSE BLDC GLUCOMTR-MCNC: 114 MG/DL (ref 70–99)
GLUCOSE BLDC GLUCOMTR-MCNC: 114 MG/DL (ref 70–99)
GLUCOSE BLDC GLUCOMTR-MCNC: 115 MG/DL (ref 70–99)
GLUCOSE BLDC GLUCOMTR-MCNC: 126 MG/DL (ref 70–99)
GLUCOSE BLDC GLUCOMTR-MCNC: 139 MG/DL (ref 70–99)
GLUCOSE BLDC GLUCOMTR-MCNC: 140 MG/DL (ref 70–99)
GLUCOSE SERPL-MCNC: 120 MG/DL (ref 70–99)
HCT VFR BLD AUTO: 33.2 % (ref 40–53)
HGB BLD-MCNC: 11.3 G/DL (ref 13.3–17.7)
LACTATE SERPL-SCNC: 1.4 MMOL/L (ref 0.7–2)
MAGNESIUM SERPL-MCNC: 1.9 MG/DL (ref 1.7–2.3)
MCH RBC QN AUTO: 30.1 PG (ref 26.5–33)
MCHC RBC AUTO-ENTMCNC: 34 G/DL (ref 31.5–36.5)
MCV RBC AUTO: 89 FL (ref 78–100)
PHOSPHATE SERPL-MCNC: 3.2 MG/DL (ref 2.5–4.5)
PLATELET # BLD AUTO: 125 10E3/UL (ref 150–450)
POTASSIUM SERPL-SCNC: 4 MMOL/L (ref 3.4–5.3)
PROT SERPL-MCNC: 5.1 G/DL (ref 6.4–8.3)
RBC # BLD AUTO: 3.75 10E6/UL (ref 4.4–5.9)
SODIUM SERPL-SCNC: 139 MMOL/L (ref 135–145)
WBC # BLD AUTO: 11.5 10E3/UL (ref 4–11)

## 2023-12-20 PROCEDURE — 83605 ASSAY OF LACTIC ACID: CPT | Performed by: INTERNAL MEDICINE

## 2023-12-20 PROCEDURE — 85027 COMPLETE CBC AUTOMATED: CPT | Performed by: SURGERY

## 2023-12-20 PROCEDURE — 250N000013 HC RX MED GY IP 250 OP 250 PS 637: Performed by: PHYSICIAN ASSISTANT

## 2023-12-20 PROCEDURE — 250N000011 HC RX IP 250 OP 636: Mod: JZ | Performed by: PHYSICIAN ASSISTANT

## 2023-12-20 PROCEDURE — 71045 X-RAY EXAM CHEST 1 VIEW: CPT

## 2023-12-20 PROCEDURE — 93005 ELECTROCARDIOGRAM TRACING: CPT

## 2023-12-20 PROCEDURE — 97535 SELF CARE MNGMENT TRAINING: CPT | Mod: GO

## 2023-12-20 PROCEDURE — 99233 SBSQ HOSP IP/OBS HIGH 50: CPT | Mod: 24 | Performed by: INTERNAL MEDICINE

## 2023-12-20 PROCEDURE — 999N000157 HC STATISTIC RCP TIME EA 10 MIN

## 2023-12-20 PROCEDURE — 99207 PR NO CHARGE LOS: CPT | Performed by: INTERNAL MEDICINE

## 2023-12-20 PROCEDURE — 80053 COMPREHEN METABOLIC PANEL: CPT | Performed by: SURGERY

## 2023-12-20 PROCEDURE — 97165 OT EVAL LOW COMPLEX 30 MIN: CPT | Mod: GO

## 2023-12-20 PROCEDURE — 83735 ASSAY OF MAGNESIUM: CPT | Performed by: SURGERY

## 2023-12-20 PROCEDURE — 97110 THERAPEUTIC EXERCISES: CPT | Mod: GO

## 2023-12-20 PROCEDURE — 84100 ASSAY OF PHOSPHORUS: CPT | Performed by: SURGERY

## 2023-12-20 PROCEDURE — 120N000001 HC R&B MED SURG/OB

## 2023-12-20 PROCEDURE — 250N000013 HC RX MED GY IP 250 OP 250 PS 637: Performed by: SURGERY

## 2023-12-20 PROCEDURE — 250N000011 HC RX IP 250 OP 636: Performed by: SURGERY

## 2023-12-20 PROCEDURE — 82330 ASSAY OF CALCIUM: CPT | Performed by: SURGERY

## 2023-12-20 RX ORDER — SIMETHICONE 80 MG
160 TABLET,CHEWABLE ORAL 4 TIMES DAILY
Status: COMPLETED | OUTPATIENT
Start: 2023-12-20 | End: 2023-12-20

## 2023-12-20 RX ORDER — NICOTINE POLACRILEX 4 MG
15-30 LOZENGE BUCCAL
Status: DISCONTINUED | OUTPATIENT
Start: 2023-12-20 | End: 2023-12-20

## 2023-12-20 RX ORDER — AMLODIPINE BESYLATE 2.5 MG/1
2.5 TABLET ORAL DAILY
Status: DISCONTINUED | OUTPATIENT
Start: 2023-12-20 | End: 2023-12-22 | Stop reason: HOSPADM

## 2023-12-20 RX ORDER — DEXTROSE MONOHYDRATE 25 G/50ML
25-50 INJECTION, SOLUTION INTRAVENOUS
Status: DISCONTINUED | OUTPATIENT
Start: 2023-12-20 | End: 2023-12-20

## 2023-12-20 RX ADMIN — OXYCODONE HYDROCHLORIDE 5 MG: 5 TABLET ORAL at 08:55

## 2023-12-20 RX ADMIN — GABAPENTIN 100 MG: 100 CAPSULE ORAL at 15:34

## 2023-12-20 RX ADMIN — POLYETHYLENE GLYCOL 3350 17 G: 17 POWDER, FOR SOLUTION ORAL at 08:10

## 2023-12-20 RX ADMIN — DOCUSATE SODIUM 50 MG AND SENNOSIDES 8.6 MG 1 TABLET: 8.6; 5 TABLET, FILM COATED ORAL at 20:18

## 2023-12-20 RX ADMIN — CLINDAMYCIN PHOSPHATE 900 MG: 900 INJECTION, SOLUTION INTRAVENOUS at 03:50

## 2023-12-20 RX ADMIN — CLINDAMYCIN PHOSPHATE 900 MG: 900 INJECTION, SOLUTION INTRAVENOUS at 13:00

## 2023-12-20 RX ADMIN — OXYCODONE HYDROCHLORIDE 5 MG: 5 TABLET ORAL at 05:14

## 2023-12-20 RX ADMIN — AMLODIPINE BESYLATE 2.5 MG: 2.5 TABLET ORAL at 08:10

## 2023-12-20 RX ADMIN — METHOCARBAMOL 750 MG: 750 TABLET ORAL at 20:17

## 2023-12-20 RX ADMIN — ACETAMINOPHEN 650 MG: 325 TABLET, FILM COATED ORAL at 16:57

## 2023-12-20 RX ADMIN — ACETAMINOPHEN 650 MG: 325 TABLET, FILM COATED ORAL at 04:42

## 2023-12-20 RX ADMIN — HEPARIN SODIUM 5000 UNITS: 5000 INJECTION, SOLUTION INTRAVENOUS; SUBCUTANEOUS at 13:01

## 2023-12-20 RX ADMIN — GABAPENTIN 100 MG: 100 CAPSULE ORAL at 08:10

## 2023-12-20 RX ADMIN — METOPROLOL TARTRATE 12.5 MG: 25 TABLET, FILM COATED ORAL at 09:56

## 2023-12-20 RX ADMIN — DOCUSATE SODIUM 50 MG AND SENNOSIDES 8.6 MG 1 TABLET: 8.6; 5 TABLET, FILM COATED ORAL at 08:10

## 2023-12-20 RX ADMIN — METOPROLOL TARTRATE 12.5 MG: 25 TABLET, FILM COATED ORAL at 20:18

## 2023-12-20 RX ADMIN — HEPARIN SODIUM 5000 UNITS: 5000 INJECTION, SOLUTION INTRAVENOUS; SUBCUTANEOUS at 21:50

## 2023-12-20 RX ADMIN — ASPIRIN 81 MG CHEWABLE TABLET 162 MG: 81 TABLET CHEWABLE at 08:10

## 2023-12-20 RX ADMIN — PANTOPRAZOLE SODIUM 40 MG: 40 TABLET, DELAYED RELEASE ORAL at 08:10

## 2023-12-20 RX ADMIN — ATORVASTATIN CALCIUM 40 MG: 40 TABLET, FILM COATED ORAL at 20:18

## 2023-12-20 RX ADMIN — SIMETHICONE 160 MG: 80 TABLET, CHEWABLE ORAL at 13:01

## 2023-12-20 RX ADMIN — SIMETHICONE 160 MG: 80 TABLET, CHEWABLE ORAL at 09:56

## 2023-12-20 RX ADMIN — HYDROMORPHONE HYDROCHLORIDE 0.2 MG: 0.2 INJECTION, SOLUTION INTRAMUSCULAR; INTRAVENOUS; SUBCUTANEOUS at 03:50

## 2023-12-20 RX ADMIN — ACETAMINOPHEN 650 MG: 325 TABLET, FILM COATED ORAL at 21:49

## 2023-12-20 RX ADMIN — GABAPENTIN 100 MG: 100 CAPSULE ORAL at 21:49

## 2023-12-20 ASSESSMENT — ACTIVITIES OF DAILY LIVING (ADL)
ADLS_ACUITY_SCORE: 23
ADLS_ACUITY_SCORE: 21
ADLS_ACUITY_SCORE: 23
ADLS_ACUITY_SCORE: 21
ADLS_ACUITY_SCORE: 19
ADLS_ACUITY_SCORE: 23
ADLS_ACUITY_SCORE: 19
ADLS_ACUITY_SCORE: 19
ADLS_ACUITY_SCORE: 21
ADLS_ACUITY_SCORE: 19
ADLS_ACUITY_SCORE: 19
DEPENDENT_IADLS:: INDEPENDENT
ADLS_ACUITY_SCORE: 21

## 2023-12-20 NOTE — PLAN OF CARE
Problem: Cardiovascular Surgery  Goal: Improved Activity Tolerance  Outcome: Progressing  Goal: Optimal Coping with Heart Surgery  Outcome: Progressing  Goal: Absence of Bleeding  Outcome: Progressing  Goal: Effective Bowel Elimination  Outcome: Progressing  Goal: Effective Cardiac Function  Outcome: Progressing  Goal: Optimal Cerebral Tissue Perfusion  Outcome: Progressing  Intervention: Protect and Optimize Cerebral Perfusion  Recent Flowsheet Documentation  Taken 12/19/2023 1800 by Primitivo Fields RN  Head of Bed (HOB) Positioning: HOB at 20-30 degrees  Taken 12/19/2023 1600 by Primitivo Fields RN  Head of Bed (HOB) Positioning: HOB at 20-30 degrees  Taken 12/19/2023 1400 by Primitivo Fields RN  Head of Bed (HOB) Positioning: HOB at 20-30 degrees  Goal: Fluid and Electrolyte Balance  Outcome: Progressing  Goal: Blood Glucose Level Within Targeted Range  Outcome: Progressing  Goal: Absence of Infection Signs and Symptoms  Outcome: Progressing  Intervention: Prevent or Manage Infection  Recent Flowsheet Documentation  Taken 12/19/2023 1800 by Primitivo Fields RN  Infection Prevention:   visitors restricted/screened   environmental surveillance performed  Taken 12/19/2023 1600 by Primitivo Fields RN  Infection Prevention:   visitors restricted/screened   environmental surveillance performed  Taken 12/19/2023 1400 by Primitivo Fields RN  Infection Prevention:   visitors restricted/screened   environmental surveillance performed  Goal: Acceptable Pain Control  Outcome: Progressing  Goal: Nausea and Vomiting Relief  Outcome: Progressing     Problem: Mechanical Ventilation Invasive  Goal: Optimal Nutrition Delivery  Outcome: Progressing   Goal Outcome Evaluation:       Patient came to ICU at 1400 from OR, s/p CAB x3. Currently is on epi @0.02, levo @ 0.08, nicardipine @ 0.5 and insulin @ 4 unit. Was successfully extubated at 1840. Labs have been within normal limits. Has received prn pain medication.  Patient is Mandarin speaker only, and his sister in-law and wife came to hospital and translate to help him get extubated. They have been updated with plan of care. He is 100% A paced with HR set at 80.

## 2023-12-20 NOTE — PROGRESS NOTES
CV  Pressors (which pressors and any increase/decrease in pressor needs): Off Levo/Epi during shift. Remains on Nicardipine for radial graft  HR range: 80s-90s  Chest tube output: 380 during shift     Neuro  Orientation: AOx4  Delirium present?(y/n): N  Sleep: 6-7 Hrs   Pain: Minor to moderate pain. Hesitant to take narcotics. Education provided.     GI/  BM? (y/n): n  Urine output: 60-70mls per hour average      Lines: R internal jugular. R art line. 2 PIV. Pacer wires remain and capped.

## 2023-12-20 NOTE — PLAN OF CARE
"CV  Pressors (which pressors and any increase/decrease in pressor needs): All pressors off   HR range: 70's-80's  Chest tube output: 20-70 ml/hr    Neuro  Orientation: A&Ox4 able to move all extremities  Delirium present?(y/n): no  Sleep: took two naps during the day  Pain: rates pain 3-4/10. PRN's given    GI/  BM? (y/n): no  Urine output: Marginal output 30ml/hr      Lines:  2 PIV saline locked    Report given to Ada on IMC. Patient and belongings transferred. Insulin pen in belongings bag.     Problem: Adult Inpatient Plan of Care  Goal: Plan of Care Review  Description: The Plan of Care Review/Shift note should be completed every shift.  The Outcome Evaluation is a brief statement about your assessment that the patient is improving, declining, or no change.  This information will be displayed automatically on your shift  note.  Outcome: Progressing  Flowsheets (Taken 12/20/2023 1557)  Outcome Evaluation: Stable to transfer out of the ICU  Plan of Care Reviewed With:   spouse   patient   child  Overall Patient Progress: no change  Goal: Patient-Specific Goal (Individualized)  Description: You can add care plan individualizations to a care plan. Examples of Individualization might be:  \"Parent requests to be called daily at 9am for status\", \"I have a hard time hearing out of my right ear\", or \"Do not touch me to wake me up as it startles  me\".  Outcome: Progressing  Flowsheets (Taken 12/20/2023 5609)  Individualized Care Needs: Patient stable to transfer out of the ICU     Problem: Cardiovascular Surgery  Goal: Blood Glucose Level Within Targeted Range  Outcome: Progressing  Intervention: Optimize Glycemic Control  Flowsheets (Taken 12/20/2023 5097)  Glycemic Management: blood glucose monitored   Goal Outcome Evaluation:      Plan of Care Reviewed With: spouse, patient, child    Overall Patient Progress: no changeOverall Patient Progress: no change    Outcome Evaluation: Stable to transfer out of the ICU      "

## 2023-12-20 NOTE — PROGRESS NOTES
12/20/23 1008   Appointment Info   Signing Clinician's Name / Credentials (OT) Kelly Wiggins, OTR/L       Present yes   Language Mandarin   Living Environment   People in Home spouse   Current Living Arrangements condominium   Home Accessibility no concerns   Transportation Anticipated family or friend will provide   Living Environment Comments Pt lives in condo w/ spouse, has elevator access and walk in shower   Self-Care   Usual Activity Tolerance excellent   Current Activity Tolerance fair   Equipment Currently Used at Home none   Fall history within last six months no   Activity/Exercise/Self-Care Comment Pt typically independent w/ ADL tasks and mobility w/out AD   Instrumental Activities of Daily Living (IADL)   IADL Comments Pt independent, works and drives.   General Information   Onset of Illness/Injury or Date of Surgery 12/19/23   Referring Physician Shreyas Valdes MD   Additional Occupational Profile Info/Pertinent History of Current Problem Pt is a 62 yo mandarin speaking male h/o HTN and AWA presented to the ED 12/15 with chest pain and anginal symptoms, cath showed multivessel disease.  Taken for CAB3 on 12/18.   Existing Precautions/Restrictions fall;cardiac;sternal   Cognitive Status Examination   Orientation Status orientation to person, place and time   Visual Perception   Visual Impairment/Limitations WFL   Pain Assessment   Patient Currently in Pain Yes, see Vital Sign flowsheet   Range of Motion Comprehensive   Comment, General Range of Motion functional w/in sternal precautions   Strength Comprehensive (MMT)   Comment, General Manual Muscle Testing (MMT) Assessment functional w/in sternal precautions   Transfers   Transfers sit-stand transfer   Sit-Stand Transfer   Sit-Stand [place] (Transfers) contact guard;verbal cues   Balance   Balance Comments Yonas progressing to CGA w/out AD in room   Activities of Daily Living   BADL Assessment/Intervention bathing;upper  body dressing;lower body dressing;grooming;toileting   Bathing Assessment/Intervention   [place] Level (Bathing) minimum assist (75% patient effort)   Comment, (Bathing) per clinical judgement   Upper Body Dressing Assessment/Training   Comment, (Upper Body Dressing) per clinical judgement   [place] Level (Upper Body Dressing) minimum assist (75% patient effort)   Lower Body Dressing Assessment/Training   [place] Level (Lower Body Dressing) minimum assist (75% patient effort)   Grooming Assessment/Training   [place] Level (Grooming) set up   Toileting   Comment, (Toileting) per clinical judgement   [place] Level (Toileting) minimum assist (75% patient effort)   Clinical Impression   Criteria for Skilled Therapeutic Interventions Met (OT) Yes, treatment indicated   OT Diagnosis decreased I/ADL independence   OT Problem List-Impairments impacting ADL problems related to;activity tolerance impaired;mobility;strength;post-surgical precautions;pain   Assessment of Occupational Performance 3-5 Performance Deficits   Identified Performance Deficits impaired independence w/ dressing, bathing, functional mobility, and toileting   Planned Therapy Interventions (OT) ADL retraining;bed mobility training;strengthening;transfer training;progressive activity/exercise;home program guidelines;risk factor education   Clinical Decision Making Complexity (OT) problem focused assessment/low complexity   Risk & Benefits of therapy have been explained evaluation/treatment results reviewed;care plan/treatment goals reviewed;risks/benefits reviewed;current/potential barriers reviewed;participants voiced agreement with care plan;patient;participants included;spouse/significant other   OT Total Evaluation Time   OT Eval, Low Complexity Minutes (45261) 10   OT Goals   Therapy Frequency (OT) 2 times/day  (once out of ICU)   OT Predicted Duration/Target Date for Goal Attainment 12/27/23   OT Goals Cardiac Phase 1;Upper  Body Dressing;Lower Body Dressing;Toilet Transfer/Toileting;Transfers   OT: Upper Body Dressing Modified independent;within precautions   OT: Lower Body Dressing Modified independent;within precautions   OT: Transfer Modified independent;within precautions   OT: Toilet Transfer/Toileting Modified independent;toilet transfer;cleaning and garment management;within precautions   OT: Understanding of cardiac education to maximize quality of life, condition management, and health outcomes Patient;Verbalize   OT: Perform aerobic activity with stable cardiovascular response continuous;10 minutes;ambulation   OT: Functional/aerobic ambulation tolerance with stable cardiovascular response in order to return to home and community environment Modified independent;Within precautions;Greater than 300 feet   OT: Navigation of stairs simulating home set up with stable cardiovascular response in order to return to home and community environment Modified independent;Greater than 10 stairs   Interventions   Interventions Quick Adds Therapeutic Procedures/Exercise;Self-Care/Home Management;Cardiac Rehab   Self-Care/Home Management   Self-Care/Home Mgmt/ADL, Compensatory, Meal Prep Minutes (04884) 10   Symptoms Noted During/After Treatment (Meal Preparation/Planning Training) none   Treatment Detail/Skilled Intervention Pt in chair upon arrival, agrees to OT/CR. Used [employer]image  throughout. Initiated post-CBAG education, see below for details. At end of session, pt left in chair w/ all needs met and alarm on, RN updated.   Therapeutic Procedures/Exercise   Therapeutic Procedure: strength, endurance, ROM, flexibillity minutes (44233) 12   Symptoms Noted During/After Treatment fatigue   Treatment Detail/Skilled Intervention Pt stood from chair w/ CGA and no AD. Engaged in marching, then ambulated w/out AD (see below), Min A initially progressing to CGA, 1 seated rest break at half point. VSS thoroughout on RA, see flowsheet for  details   Treatment Time Includes (CR Only) See specific exercise details intervention group(s);Monitoring of vital signs (see vital signs flowsheet for details);Extra time managing multiple lines/tubes   Ambulation   Duration (minutes) 2 minutes   Symptoms Fatigue   Cardiovascular Response Normal   Vital Signs Details see VS flowsheet   Cardiac Education   Education Provided Outpatient Cardiac Rehab;Resuming home activities;Precautions   Education Packet Given to Patient No   All Patient Education Handouts Reviewed with Patient and/or Family No   Cardiac Rehab Phase II Plan   Phase II Order Received Yes   Phase II Appointment Status Scheduled   Date/Time 1/10/24   Location [facility]   OT Discharge Planning   OT Plan timed ambulation, post-CABG education (pt mandarin speaking) ADL tasks w/in sternal precautions   OT Discharge Recommendation (DC Rec) home with assist;home with outpatient cardiac rehab   OT Rationale for DC Rec Pt below baseline after surgery, limited by decreased activity tolerance, pain and sternal precautions. Pt motivated and has good support at home from spouse. Recommend return home w/ assist as needed for I/ADL tasks. Also recommend OP CR for monitored progressive exercise and education.   OT Brief overview of current status CGA w/out AD, VSS w/ ambulation   OT Equipment Needed at Discharge   (TBD, maybe shower chair)   Total Session Time   Timed Code Treatment Minutes 22   Total Session Time (sum of timed and untimed services) 32

## 2023-12-20 NOTE — PROGRESS NOTES
Extubation Note    Successful completion of SBT (Yes or No):Yes  Extubation time:1840    Patient assessment:  Lung sounds:Clear bilaterally.  Stridor Present (Yes or No): No  Patient tolerance: Tolerated well.    Oxygen device:  Liter flow:4  SpO2:94%

## 2023-12-20 NOTE — CONSULTS
Care Management Initial Consult    General Information  Assessment completed with: Patient, Spouse or significant other, Other, Natividadnchet, spouse Chang &  servciyogesh  Type of CM/SW Visit: Initial Assessment    Primary Care Provider verified and updated as needed: Yes   Readmission within the last 30 days: no previous admission in last 30 days      Reason for Consult: other (see comments) (elevated risk of readmission)  Advance Care Planning:            Communication Assessment  Patient's communication style: spoken language (non-English)    Hearing Difficulty or Deaf: no   Wear Glasses or Blind: no    Cognitive  Cognitive/Neuro/Behavioral: .WDL except  Level of Consciousness: alert  Arousal Level: arouses to voice, opens eyes spontaneously  Orientation: oriented x 4  Mood/Behavior: calm, cooperative     Speech: clear    Living Environment:   People in home: spouse  Chang  Current living Arrangements: condominium      Able to return to prior arrangements: yes       Family/Social Support:  Care provided by: self  Provides care for: no one  Marital Status:   Children, Wife  Chang       Description of Support System: Supportive, Involved         Current Resources:   Patient receiving home care services: No     Community Resources: None  Equipment currently used at home: none  Supplies currently used at home: Oxygen Tubing/Supplies (cpap)    Employment/Financial:  Employment Status: employed full-time        Financial Concerns: none   Referral to Financial Worker: No       Does the patient's insurance plan have a 3 day qualifying hospital stay waiver?  No    Lifestyle & Psychosocial Needs:  Social Determinants of Health     Food Insecurity: Not on file   Depression: Not at risk (5/20/2022)    PHQ-2     PHQ-2 Score: 0   Housing Stability: Not on file   Tobacco Use: Medium Risk (12/19/2023)    Patient History     Smoking Tobacco Use: Former     Smokeless Tobacco Use: Never     Passive Exposure: Not on file    Financial Resource Strain: Not on file   Alcohol Use: Not on file   Transportation Needs: Not on file   Physical Activity: Not on file   Interpersonal Safety: Not on file   Stress: Not on file   Social Connections: Not on file       Functional Status:  Prior to admission patient needed assistance:   Dependent ADLs:: Independent  Dependent IADLs:: Independent       Mental Health Status:          Chemical Dependency Status:                Values/Beliefs:  Spiritual, Cultural Beliefs, Druze Practices, Values that affect care: no               Additional Information:  Per consult for elevated risk of readmission and discharge planning, met with pt and pt's spouse Chang and completed consult using a Mandarin  over the phone.  Per pt, he was independent with his ADLs & IADLs and working full time as a .  Discussed that OT is recommending Outpatient CR.  Patient stated understanding of this and pt's spouse confirmed that she will be able to drive pt to his outpt therapy appts and follow-up medical appts.    Inpatient Care Coordinator will continue to follow for discharge planning.  HALIE Jean RN, BSN, OCN   Inpatient Care Coordination [facility]  Office: [phone]

## 2023-12-20 NOTE — PROGRESS NOTES
Patient was evaluated by intensivist service 12/20, medicine service will continue chart check for today and resume consult service role on 12/21.  Please contact us if any new concerns arise.

## 2023-12-20 NOTE — PLAN OF CARE
Problem: Cardiovascular Surgery  Goal: Improved Activity Tolerance  Outcome: Progressing  Goal: Optimal Coping with Heart Surgery  Outcome: Progressing  Goal: Absence of Bleeding  Outcome: Progressing  Goal: Effective Bowel Elimination  Outcome: Progressing  Goal: Effective Cardiac Function  Outcome: Progressing  Goal: Optimal Cerebral Tissue Perfusion  Outcome: Progressing  Intervention: Protect and Optimize Cerebral Perfusion  Recent Flowsheet Documentation  Taken 12/20/2023 0600 by Kyle Merrill RN  Head of Bed (HOB) Positioning: HOB at 30 degrees  Taken 12/20/2023 0400 by Kyle Merrill RN  Head of Bed (HOB) Positioning: HOB at 30 degrees  Taken 12/20/2023 0200 by Kyle Merrill RN  Head of Bed (HOB) Positioning: HOB at 30 degrees  Taken 12/20/2023 0000 by Kyle Merrill RN  Head of Bed (HOB) Positioning: HOB at 30 degrees  Taken 12/19/2023 2000 by Kyle Merrill RN  Head of Bed (HOB) Positioning: HOB at 30 degrees  Goal: Fluid and Electrolyte Balance  Outcome: Progressing  Goal: Blood Glucose Level Within Targeted Range  Outcome: Progressing  Goal: Absence of Infection Signs and Symptoms  Outcome: Progressing  Intervention: Prevent or Manage Infection  Recent Flowsheet Documentation  Taken 12/20/2023 0400 by Kyle Merrill RN  Infection Prevention:   visitors restricted/screened   environmental surveillance performed  Taken 12/20/2023 0000 by Kyle Merrill RN  Infection Prevention:   visitors restricted/screened   environmental surveillance performed  Taken 12/19/2023 2000 by Kyle Merrill RN  Infection Prevention:   visitors restricted/screened   environmental surveillance performed  Goal: Acceptable Pain Control  Outcome: Progressing  Goal: Nausea and Vomiting Relief  Outcome: Progressing   Goal Outcome Evaluation:

## 2023-12-20 NOTE — PROGRESS NOTES
"Crit care consult:    S:  No complaints this morning.  Chest pain appropriate to procedure; controlled by analgesia.  Denies SOB.  No dizziness/lightheadedness.    O:  /75   Pulse 79   Temp 99.9  F (37.7  C)   Resp 21   Ht 1.75 m (5' 8.9\")   Wt 85.1 kg (187 lb 9.8 oz)   SpO2 98%   BMI 27.79 kg/m    4L nc  Gen:  No acute distress // HEENT:  PERRL, nose/ears grossly normal // Neck:  Supple // Lymph : no cervical adenopathy // chest : CTA-B, unlabored, chest wound c/d/i // cor:  rrr no m/r/g // abd s/nt/nd // extr:  wwp x4, no edema // neuro:  Awake, alert, rapid fluent appropriate speech, good str/sens x4 // skin:  No obvious rash      Labs (personally reviewed): lytes ok;  creat 0.88; mild ast elevation 157; wbc downtrending 11.5; hgb 11.3 acute blood loss anemia, appropriate to procedure, pltlts 125 mild thrombocytopenia likely consumptive     CXR (personally reviewed): lungs clear.  Line in good position    EKG (personally reviewed): sinus 82, nl axis, nl int, no acute ischemic changes.    A/P:  63M pod 1 s/p cab3.  Overall doing well.  Cardiac:  Shock, post-procedure, unspecified:  resolved.  Now stable off vaso-actives.         CAD:  Continue asa, metoprolol, statin  Pulm:  Breathing comfortably extubated.        Hypoxemia:  Requiring low level nasal cannula.  Likely due to atelectasis which would be expected after a procedure of this magnitude.  Wean nasal cannula as tolerated.   Neuro: Analgesia:  Continue prn tylenol/opiates for post-op pain.  GI:  Advance diet as tolerated.  Endo:  Hyperglycemia:  Stress-related post-op as would be expected after a procedure of this magnitude.  Continue prn insulin per protocol.  Prophylaxis:  Continue subcutaneous heparin.      Clinically Significant Risk Factors          # Hypocalcemia: Lowest Ca = 7.8 mg/dL in last 2 days, will monitor and replace as appropriate     # Hypoalbuminemia: Lowest albumin = 3.2 g/dL at 12/20/2023  3:57 AM, will monitor as " "appropriate  # Coagulation Defect: INR = 1.21 (Ref range: 0.85 - 1.15) and/or PTT = 32 Seconds (Ref range: 22 - 38 Seconds), will monitor for bleeding  # Thrombocytopenia: Lowest platelets = 125 in last 2 days, will monitor for bleeding   # Hypertension: Noted on problem list        # Overweight: Estimated body mass index is 27.79 kg/m  as calculated from the following:    Height as of this encounter: 1.75 m (5' 8.9\").    Weight as of this encounter: 85.1 kg (187 lb 9.8 oz).       # History of CABG: noted on surgical history            Intensivist service will sign off.  Please re-consult as needed.    D/w ALLEN Lewis of the cv surgery service    "

## 2023-12-20 NOTE — PROGRESS NOTES
Intensivist:   Notified of lactate in 6s.   Otherwise doing well. CI 2.7, extubated per pathway wihtout difficulty.  Likely epinephrine induced.   Attempting to wean epi as able while maintaining CI.  Trending lactic.

## 2023-12-20 NOTE — PROGRESS NOTES
[facility]  Cardiovascular and Thoracic Surgery Daily Note      Assessment and Plan  POD # 1 s/p Coronary artery bypass grafting x 3, -reversed saphenous vein graft to the right posterior descending coronary artery, -radial artery graft to the obtuse marginal branch of the left circumflex coronary artery, in-situ left internal mammary artery to the left anterior descending coronary artery. 2.  Endoscopic harvest of the greater saphenous vein from the left lower extremity. 3.  Endoscopic harvest of the radial artery from the left upper extremity, Epiaortic ultrasonography on 23 with Dr. Napoles    - CVS: Pre-op TTE with 45-50%, ASA, BB when able, statin, sub q heparin, transition nicardipine to amlodipine-do NOT hold unless discussed with CV surgery, off gtt's.     - Resp: Extubated per protocol, sating well on 4L. IS, pulmonary toilet    - Neuro: Neuro intact, pain controlled on current regimen    - Renal: No history of significant renal disease. Cr wnl. Trend BMP. Diuretic as above.  Recent Labs   Lab 23  0357 23  1408 23  1251   CR 0.88 0.95 0.93       - GI: -BM, continue bowel regimen    - : +Marcelino, limited mobility, accurate Is & Os    - Endo: Insulin infusion transition to sliding scale insulin    Hemoglobin A1C   Date Value Ref Range Status   2023 5.6 <5.7 % Final     Comment:     Normal <5.7%   Prediabetes 5.7-6.4%    Diabetes 6.5% or higher     Note: Adopted from ADA consensus guidelines.   2021 5.6 0 - 5.6 % Final     Comment:     Normal <5.7% Prediabetes 5.7-6.4%  Diabetes 6.5% or higher - adopted from ADA   consensus guidelines.          - FEN: Replace electrolytes as needed. ADAT-Regular diet.     - ID: Postop leukocytosis, Temp (24hrs), Av.2  F (36.8  C), Min:95.7  F (35.4  C), Max:99.9  F (37.7  C). Afebrile. WBC reactive from surgery. Periop abx completing. Trend CBC.   Recent Labs   Lab 23  0357 23  1408 23  1251   WBC 11.5*  "22.8* 20.4*       - Heme: Acute blood loss anemia and thrombocytopenia due to surgery. Hgb and PLT wnl. Trend CBC, transfuse PRN.   Recent Labs   Lab 12/20/23  0357 12/19/23  1408 12/19/23  1251   HGB 11.3* 12.5* 11.0*   * 144* 153       - Proph: SCD, subcutaneous heparin, PPI    - Dispo: ICU-->St [facility], initiate therapies, encourage IS      Interval History  Sitting up in bed, breathing stable, pain mostly controlled, start therapies      Medications   amLODIPine  2.5 mg Oral Daily    aspirin  162 mg Oral or NG Tube Daily    atorvastatin  40 mg Oral QPM    clindamycin  900 mg Intravenous Q8H    gabapentin  100 mg Oral TID    heparin ANTICOAGULANT  5,000 Units Subcutaneous Q8H    insulin aspart  1-7 Units Subcutaneous TID AC    insulin aspart  1-5 Units Subcutaneous At Bedtime    pantoprazole  40 mg Oral or NG Tube Daily    Or    pantoprazole  40 mg Oral Daily    polyethylene glycol  17 g Oral Daily    senna-docusate  1 tablet Oral BID    sodium chloride (PF)  3 mL Intracatheter Q8H    sodium chloride (PF)  3 mL Intracatheter Q8H     acetaminophen **OR** acetaminophen, bisacodyl, calcium carbonate, calcium gluconate, calcium gluconate, calcium gluconate, glucose **OR** dextrose **OR** glucagon, HOLD MEDICATION, hydrALAZINE, HYDROmorphone **OR** HYDROmorphone, lactated ringers, lidocaine 4%, lidocaine (buffered or not buffered), LORazepam **OR** LORazepam, magnesium hydroxide, - MEDICATION INSTRUCTIONS -, methocarbamol, naloxone **OR** naloxone **OR** naloxone **OR** naloxone, niCARdipine, nitroGLYcerin, ondansetron **OR** ondansetron, oxyCODONE **OR** oxyCODONE, - MEDICATION INSTRUCTIONS -, phenylephrine, prochlorperazine **OR** prochlorperazine, BETA BLOCKER NOT PRESCRIBED, sodium chloride (PF), sodium chloride (PF)      Physical Exam  Vitals were reviewed  Blood pressure 112/75, pulse 79, temperature 99.9  F (37.7  C), resp. rate 21, height 1.75 m (5' 8.9\"), weight 85.1 kg (187 lb 9.8 oz), SpO2 98%.  Rhythm: " NSR    Lungs: course    Cardiovascular: rrr, no m/r/g    Abdomen: soft, NT, ND, +BS    Extremeties: warm, no LE edema    Incision: CDI    CT: 510 ml serosang output, no air leak    Weight:   Vitals:    12/16/23 0600 12/17/23 0614 12/18/23 0436 12/19/23 0451   Weight: 83.5 kg (184 lb) 83.1 kg (183 lb 4.8 oz) 83.6 kg (184 lb 6.4 oz) 84.2 kg (185 lb 9.6 oz)    12/20/23 0600   Weight: 85.1 kg (187 lb 9.8 oz)         Data  Recent Labs   Lab 12/20/23  0823 12/20/23  0603 12/20/23  0357 12/19/23  1429 12/19/23  1408 12/19/23  1251   WBC  --   --  11.5*  --  22.8* 20.4*   HGB  --   --  11.3*  --  12.5* 11.0*   MCV  --   --  89  --  89 90   PLT  --   --  125*  --  144* 153   INR  --   --   --   --  1.21* 1.32*   NA  --   --  139  --  142 139   POTASSIUM  --   --  4.0  --  4.0 4.7   CHLORIDE  --   --  108*  --  109* 109*   CO2  --   --  26  --  21* 22   BUN  --   --  14.2  --  14.7 14.3   CR  --   --  0.88  --  0.95 0.93   ANIONGAP  --   --  5*  --  12 8   STEPHEN  --   --  7.8*  --  8.3* 8.4*   * 114* 111*  120*   < > 188* 180*   ALBUMIN  --   --  3.2*  --  3.4*  --    PROTTOTAL  --   --  5.1*  --  5.5*  --    BILITOTAL  --   --  0.6  --  0.8  --    ALKPHOS  --   --  30*  --  35*  --    ALT  --   --  60  --  68  --    AST  --   --  57*  --  77*  --     < > = values in this interval not displayed.       Imaging:  Recent Results (from the past 24 hour(s))   GISEL with Report    Narrative    Edgardo Heriberto DO [facility]     12/19/2023  9:05 AM  Perioperative GISEL Procedure Note    Staff -        Performed By: anesthesiologist  Preanesthesia Checklist:  Patient identified, IV assessed, risks and   benefits discussed, monitors and equipment assessed, procedure being   performed at surgeon's request and anesthesia consent obtained.    GISEL Probe Insertion    Probe Status PRE Insertion: NO obvious damage  Probe type:  Adult 3D  Bite block used:   Soft  Insertion Technique: Easy, no oropharyngeal manipulation  Insertion complications:  None obvious  Billing Report:A GISEL report is NOT being generated.  Probe Status POST Removal: NO obvious damage         XR Chest Port 1 View    Narrative    CHEST PORTABLE 1 VIEW  12/19/2023 2:56 PM       INDICATION: Postop CVTS surgery.  COMPARISON: 12/17/2023       Impression    IMPRESSION: New postoperative changes with sternotomy wires,  mediastinal clips and tubes, left chest tube, right IJ central venous  catheter in the SVC, endotracheal tube above the prema, and NG tube  tip at the GE junction. There is likely a small left pleural effusion  and atelectasis in the left lung base. No pneumothorax.    ERIN MUÑOZ MD         SYSTEM ID:  [id number]   XR Abdomen Port 1 View    Narrative    XR ABDOMEN PORT 1 VIEW 12/19/2023 4:17 PM    HISTORY: OG placement    COMPARISON: None.      Impression    IMPRESSION: OG tube in good position in the stomach.    ERIN MUÑOZ MD         SYSTEM ID:  [id number]   XR Chest Port 1 View    Narrative    EXAM: XR CHEST PORTABLE 1 VIEW  LOCATION: [facility]  DATE: 12/20/2023    INDICATION: Postop CVTS surgery.  COMPARISON: 12/19/2023.      Impression    IMPRESSION: Right IJ central venous catheter tip over the SVC. Mediastinal drains and left chest tube. No pneumothorax. Sternotomy. Cardiac enlargement. Pulmonary vascularity within normal limits. Mild left basilar infiltrate versus atelectasis with   probable small left pleural effusion. Right lung is clear.           Patient seen and discussed with Dr. Dony Lockett PAFREDDY  Cardiothoracic Surgery  Available for paging [phone] (personal pager or CV Surgery Rounding Pager)  Personal Pager: [phone]  CV Surgery Rounding Pager: [phone]  After hours please page surgeon on-call

## 2023-12-21 ENCOUNTER — APPOINTMENT (OUTPATIENT)
Dept: OCCUPATIONAL THERAPY | Facility: CLINIC | Age: 63
End: 2023-12-21
Payer: COMMERCIAL

## 2023-12-21 LAB
ALBUMIN SERPL BCG-MCNC: 3 G/DL (ref 3.5–5.2)
ALP SERPL-CCNC: 29 U/L (ref 40–150)
ALT SERPL W P-5'-P-CCNC: 48 U/L (ref 0–70)
ANION GAP SERPL CALCULATED.3IONS-SCNC: 7 MMOL/L (ref 7–15)
AST SERPL W P-5'-P-CCNC: 34 U/L (ref 0–45)
BASE EXCESS BLDA CALC-SCNC: -0.7 MMOL/L (ref -9.6–2)
BASE EXCESS BLDA CALC-SCNC: -0.9 MMOL/L (ref -9.6–2)
BASE EXCESS BLDA CALC-SCNC: -2.2 MMOL/L (ref -9.6–2)
BASE EXCESS BLDA CALC-SCNC: 0.1 MMOL/L (ref -9.6–2)
BASE EXCESS BLDA CALC-SCNC: 0.1 MMOL/L (ref -9.6–2)
BASE EXCESS BLDA CALC-SCNC: 0.9 MMOL/L (ref -9.6–2)
BASE EXCESS BLDV CALC-SCNC: 1.2 MMOL/L (ref -8.1–1.9)
BILIRUB SERPL-MCNC: 0.8 MG/DL
BUN SERPL-MCNC: 15.2 MG/DL (ref 8–23)
CA-I BLD-MCNC: 3.9 MG/DL (ref 4.4–5.2)
CA-I BLD-MCNC: 3.9 MG/DL (ref 4.4–5.2)
CA-I BLD-MCNC: 4 MG/DL (ref 4.4–5.2)
CA-I BLD-MCNC: 4.1 MG/DL (ref 4.4–5.2)
CA-I BLD-MCNC: 4.1 MG/DL (ref 4.4–5.2)
CA-I BLD-MCNC: 4.5 MG/DL (ref 4.4–5.2)
CA-I BLD-MCNC: 4.9 MG/DL (ref 4.4–5.2)
CALCIUM SERPL-MCNC: 7.9 MG/DL (ref 8.8–10.2)
CHLORIDE SERPL-SCNC: 102 MMOL/L (ref 98–107)
CREAT SERPL-MCNC: 0.97 MG/DL (ref 0.67–1.17)
DEPRECATED HCO3 PLAS-SCNC: 26 MMOL/L (ref 22–29)
EGFRCR SERPLBLD CKD-EPI 2021: 87 ML/MIN/1.73M2
ERYTHROCYTE [DISTWIDTH] IN BLOOD BY AUTOMATED COUNT: 12.3 % (ref 10–15)
GLUCOSE BLD-MCNC: 102 MG/DL (ref 70–99)
GLUCOSE BLD-MCNC: 123 MG/DL (ref 70–99)
GLUCOSE BLD-MCNC: 124 MG/DL (ref 70–99)
GLUCOSE BLD-MCNC: 138 MG/DL (ref 70–99)
GLUCOSE BLD-MCNC: 158 MG/DL (ref 70–99)
GLUCOSE BLD-MCNC: 166 MG/DL (ref 70–99)
GLUCOSE BLD-MCNC: 180 MG/DL (ref 70–99)
GLUCOSE BLDC GLUCOMTR-MCNC: 116 MG/DL (ref 70–99)
GLUCOSE BLDC GLUCOMTR-MCNC: 125 MG/DL (ref 70–99)
GLUCOSE BLDC GLUCOMTR-MCNC: 131 MG/DL (ref 70–99)
GLUCOSE SERPL-MCNC: 111 MG/DL (ref 70–99)
HCO3 BLDA-SCNC: 22 MMOL/L (ref 21–28)
HCO3 BLDA-SCNC: 23 MMOL/L (ref 21–28)
HCO3 BLDA-SCNC: 25 MMOL/L (ref 21–28)
HCO3 BLDA-SCNC: 26 MMOL/L (ref 21–28)
HCO3 BLDA-SCNC: 27 MMOL/L (ref 21–28)
HCO3 BLDA-SCNC: 27 MMOL/L (ref 21–28)
HCO3 BLDV-SCNC: 28 MMOL/L (ref 21–28)
HCT VFR BLD AUTO: 31.3 % (ref 40–53)
HGB BLD-MCNC: 10.3 G/DL (ref 13.3–17.7)
HGB BLD-MCNC: 10.3 G/DL (ref 13.3–17.7)
HGB BLD-MCNC: 10.5 G/DL (ref 13.3–17.7)
HGB BLD-MCNC: 10.7 G/DL (ref 13.3–17.7)
HGB BLD-MCNC: 10.8 G/DL (ref 13.3–17.7)
HGB BLD-MCNC: 10.9 G/DL (ref 13.3–17.7)
HGB BLD-MCNC: 11.4 G/DL (ref 13.3–17.7)
HGB BLD-MCNC: 13.4 G/DL (ref 13.3–17.7)
LACTATE BLD-SCNC: 1 MMOL/L
LACTATE BLD-SCNC: 1.1 MMOL/L
LACTATE BLD-SCNC: 1.2 MMOL/L
LACTATE BLD-SCNC: 1.7 MMOL/L
MAGNESIUM SERPL-MCNC: 2 MG/DL (ref 1.7–2.3)
MCH RBC QN AUTO: 30.7 PG (ref 26.5–33)
MCHC RBC AUTO-ENTMCNC: 33.5 G/DL (ref 31.5–36.5)
MCV RBC AUTO: 92 FL (ref 78–100)
O2/TOTAL GAS SETTING VFR VENT: 100 %
O2/TOTAL GAS SETTING VFR VENT: 70 %
O2/TOTAL GAS SETTING VFR VENT: 70 %
O2/TOTAL GAS SETTING VFR VENT: 80 %
PCO2 BLDA: 33 MM HG (ref 35–45)
PCO2 BLDA: 37 MM HG (ref 35–45)
PCO2 BLDA: 47 MM HG (ref 35–45)
PCO2 BLDA: 48 MM HG (ref 35–45)
PCO2 BLDA: 49 MM HG (ref 35–45)
PCO2 BLDA: 50 MM HG (ref 35–45)
PCO2 BLDV: 51 MM HG (ref 40–50)
PH BLDA: 7.33 [PH] (ref 7.35–7.45)
PH BLDA: 7.33 [PH] (ref 7.35–7.45)
PH BLDA: 7.34 [PH] (ref 7.35–7.45)
PH BLDA: 7.36 [PH] (ref 7.35–7.45)
PH BLDA: 7.39 [PH] (ref 7.35–7.45)
PH BLDA: 7.46 [PH] (ref 7.35–7.45)
PH BLDV: 7.34 [PH] (ref 7.32–7.43)
PHOSPHATE SERPL-MCNC: 2.3 MG/DL (ref 2.5–4.5)
PLATELET # BLD AUTO: 115 10E3/UL (ref 150–450)
PO2 BLDA: 297 MM HG (ref 80–105)
PO2 BLDA: 309 MM HG (ref 80–105)
PO2 BLDA: 399 MM HG (ref 80–105)
PO2 BLDA: 510 MM HG (ref 80–105)
PO2 BLDA: 530 MM HG (ref 80–105)
PO2 BLDA: 554 MM HG (ref 80–105)
PO2 BLDV: 53 MM HG (ref 25–47)
POTASSIUM BLD-SCNC: 4.1 MMOL/L (ref 3.5–5)
POTASSIUM BLD-SCNC: 4.6 MMOL/L (ref 3.5–5)
POTASSIUM BLD-SCNC: 5.5 MMOL/L (ref 3.5–5)
POTASSIUM BLD-SCNC: 5.6 MMOL/L (ref 3.5–5)
POTASSIUM BLD-SCNC: 5.6 MMOL/L (ref 3.5–5)
POTASSIUM BLD-SCNC: 5.9 MMOL/L (ref 3.5–5)
POTASSIUM BLD-SCNC: 6.1 MMOL/L (ref 3.5–5)
POTASSIUM SERPL-SCNC: 4.1 MMOL/L (ref 3.4–5.3)
PROT SERPL-MCNC: 5.1 G/DL (ref 6.4–8.3)
RBC # BLD AUTO: 3.42 10E6/UL (ref 4.4–5.9)
SODIUM BLD-SCNC: 137 MMOL/L (ref 135–145)
SODIUM BLD-SCNC: 138 MMOL/L (ref 135–145)
SODIUM BLD-SCNC: 139 MMOL/L (ref 135–145)
SODIUM SERPL-SCNC: 135 MMOL/L (ref 135–145)
WBC # BLD AUTO: 12.4 10E3/UL (ref 4–11)

## 2023-12-21 PROCEDURE — 99232 SBSQ HOSP IP/OBS MODERATE 35: CPT | Performed by: INTERNAL MEDICINE

## 2023-12-21 PROCEDURE — 250N000013 HC RX MED GY IP 250 OP 250 PS 637: Performed by: PHYSICIAN ASSISTANT

## 2023-12-21 PROCEDURE — 120N000001 HC R&B MED SURG/OB

## 2023-12-21 PROCEDURE — 250N000011 HC RX IP 250 OP 636: Mod: JZ | Performed by: PHYSICIAN ASSISTANT

## 2023-12-21 PROCEDURE — 84100 ASSAY OF PHOSPHORUS: CPT | Performed by: INTERNAL MEDICINE

## 2023-12-21 PROCEDURE — 85027 COMPLETE CBC AUTOMATED: CPT | Performed by: PHYSICIAN ASSISTANT

## 2023-12-21 PROCEDURE — 97110 THERAPEUTIC EXERCISES: CPT | Mod: GO

## 2023-12-21 PROCEDURE — 82040 ASSAY OF SERUM ALBUMIN: CPT | Performed by: INTERNAL MEDICINE

## 2023-12-21 PROCEDURE — 250N000013 HC RX MED GY IP 250 OP 250 PS 637: Performed by: INTERNAL MEDICINE

## 2023-12-21 PROCEDURE — 83735 ASSAY OF MAGNESIUM: CPT | Performed by: INTERNAL MEDICINE

## 2023-12-21 PROCEDURE — 97535 SELF CARE MNGMENT TRAINING: CPT | Mod: GO

## 2023-12-21 PROCEDURE — 36415 COLL VENOUS BLD VENIPUNCTURE: CPT | Performed by: INTERNAL MEDICINE

## 2023-12-21 RX ORDER — FUROSEMIDE 10 MG/ML
20 INJECTION INTRAMUSCULAR; INTRAVENOUS ONCE
Status: COMPLETED | OUTPATIENT
Start: 2023-12-21 | End: 2023-12-21

## 2023-12-21 RX ADMIN — FUROSEMIDE 20 MG: 10 INJECTION, SOLUTION INTRAMUSCULAR; INTRAVENOUS at 08:26

## 2023-12-21 RX ADMIN — POTASSIUM & SODIUM PHOSPHATES POWDER PACK 280-160-250 MG 1 PACKET: 280-160-250 PACK at 15:18

## 2023-12-21 RX ADMIN — POTASSIUM & SODIUM PHOSPHATES POWDER PACK 280-160-250 MG 1 PACKET: 280-160-250 PACK at 08:26

## 2023-12-21 RX ADMIN — PANTOPRAZOLE SODIUM 40 MG: 40 TABLET, DELAYED RELEASE ORAL at 08:25

## 2023-12-21 RX ADMIN — POTASSIUM & SODIUM PHOSPHATES POWDER PACK 280-160-250 MG 1 PACKET: 280-160-250 PACK at 12:36

## 2023-12-21 RX ADMIN — GABAPENTIN 100 MG: 100 CAPSULE ORAL at 21:38

## 2023-12-21 RX ADMIN — HEPARIN SODIUM 5000 UNITS: 5000 INJECTION, SOLUTION INTRAVENOUS; SUBCUTANEOUS at 21:38

## 2023-12-21 RX ADMIN — ASPIRIN 81 MG CHEWABLE TABLET 162 MG: 81 TABLET CHEWABLE at 08:26

## 2023-12-21 RX ADMIN — AMLODIPINE BESYLATE 2.5 MG: 2.5 TABLET ORAL at 08:25

## 2023-12-21 RX ADMIN — METOPROLOL TARTRATE 12.5 MG: 25 TABLET, FILM COATED ORAL at 08:26

## 2023-12-21 RX ADMIN — GABAPENTIN 100 MG: 100 CAPSULE ORAL at 15:18

## 2023-12-21 RX ADMIN — DOCUSATE SODIUM 50 MG AND SENNOSIDES 8.6 MG 1 TABLET: 8.6; 5 TABLET, FILM COATED ORAL at 21:38

## 2023-12-21 RX ADMIN — METHOCARBAMOL 750 MG: 750 TABLET ORAL at 08:51

## 2023-12-21 RX ADMIN — DOCUSATE SODIUM 50 MG AND SENNOSIDES 8.6 MG 1 TABLET: 8.6; 5 TABLET, FILM COATED ORAL at 08:25

## 2023-12-21 RX ADMIN — ATORVASTATIN CALCIUM 40 MG: 40 TABLET, FILM COATED ORAL at 19:22

## 2023-12-21 RX ADMIN — HEPARIN SODIUM 5000 UNITS: 5000 INJECTION, SOLUTION INTRAVENOUS; SUBCUTANEOUS at 07:00

## 2023-12-21 RX ADMIN — GABAPENTIN 100 MG: 100 CAPSULE ORAL at 08:25

## 2023-12-21 RX ADMIN — ACETAMINOPHEN 650 MG: 325 TABLET, FILM COATED ORAL at 08:51

## 2023-12-21 RX ADMIN — ACETAMINOPHEN 650 MG: 325 TABLET, FILM COATED ORAL at 19:22

## 2023-12-21 RX ADMIN — POLYETHYLENE GLYCOL 3350 17 G: 17 POWDER, FOR SOLUTION ORAL at 08:26

## 2023-12-21 RX ADMIN — HEPARIN SODIUM 5000 UNITS: 5000 INJECTION, SOLUTION INTRAVENOUS; SUBCUTANEOUS at 15:18

## 2023-12-21 RX ADMIN — METOPROLOL TARTRATE 12.5 MG: 25 TABLET, FILM COATED ORAL at 21:38

## 2023-12-21 ASSESSMENT — ACTIVITIES OF DAILY LIVING (ADL)
ADLS_ACUITY_SCORE: 22
ADLS_ACUITY_SCORE: 23
ADLS_ACUITY_SCORE: 23
ADLS_ACUITY_SCORE: 22
ADLS_ACUITY_SCORE: 23
ADLS_ACUITY_SCORE: 23
ADLS_ACUITY_SCORE: 22
ADLS_ACUITY_SCORE: 22
ADLS_ACUITY_SCORE: 23

## 2023-12-21 NOTE — PLAN OF CARE
A&O x 4, Mandarin speaking,  utilized. Wife at bedside, attentive to pt, assisted w/interpreting. PRN tylenol given x1 for slight fever in evening, otherwise VSS on RA. Home cpap used overnight. Tele: NSR. Assist x1, sternal precautions maintained. Regular diet, tolerating well. 2 rt PIV SL. Sternal incision, rt harvest sites x4, all open to air, WNL. CT 3:1 to -20 suction, no air leak, no crepitus, serosanguinous output. LS clear, congested/productive cough at times. Pain w/cough, PRN robaxin given x1, otherwise denies pain. Marcelino in place, adequate UO. No BM this shift, active bowel sounds. Continue plan of care.     Goal Outcome Evaluation:      Plan of Care Reviewed With: patient, spouse    Overall Patient Progress: improving

## 2023-12-21 NOTE — CONSULTS
"NUTRITION ASSESSMENT      REASON FOR ASSESSMENT:  Cardiac Surgery Nutrition Consult    CURRENT DIET / INTAKE:  Diet: Regular Diet Adult      Intake:   Visited w/ pt and pt's family. Family member provided translation. Pt just had broth yesterday post-op but this AM he had a good breakfast.   100% intakes documented per nursing flow sheet  Encouraged protein intake for post-op healing. Offered oral protein supplements for increased protein intake for post-op healing.    Pt would like chocolate Ensure Enlive scheduled.   Reviewed plans for cardiac rehab and heart healthy diet when discharged.      ANTHROPOMETRICS:   Ht: 5' 8.898\"  Wt: 84.2 kg (12/19)  BMI: 27.49  IBW: 72.7 kg  Weight Status: Overweight BMI 25-29.9  %IBW: 116%    MALNUTRITION:  Patient does not meet two of the following criteria necessary for diagnosing malnutrition:  significant weight loss, reduced intake, subcutaneous fat loss, muscle loss or fluid retention. Nutrition Focused Physical Assessment (NFPA) not appropriate at this time.    NUTRITION DIAGNOSIS:   Increased nutrition needs (protein) r/t recent open heart surgery AEB need for oral nutrition supplements    INTERVENTIONS:  Nutrition Prescription:  Regular diet     Implementation:  Nutrition Education (Content):  Discussed the importance of adequate nutrition post-op for healing and energy, emphasizing protein foods, and encouraged patient to order a protein food at each meal.    Goals:  Patient to consume ~75% at meals in the next 3 - 5 days    Follow Up/Monitoring (InPatient):  Food and Fluid intake -  Monitor for adequacy    Follow Up/Monitoring (OutPatient):  Patient will participate in out-patient cardiac rehab and attend nutrition classes during the program    Beatriz Fowler RD, LD    "

## 2023-12-21 NOTE — PROGRESS NOTES
[facility]    Hospitalist Progress Note    Assessment & Plan   zuleyma Bradford is a 63 year old Mandarin-speaking male with PMHx of hypertension and AWA on CPAP who was admitted on 12/15/2023 with an NSTEMI     NSTEMI  Hypertension  *Home meds: amlodipine 10mg daily  *Presented to ED with intermittent chest pain over the past 3 weeks which, was initially only with work and exerting himself but subsequently progressed to occurring at rest. Was seen in urgent care 2 days prior to admission and recommended to go to the ED but he declined. He presented to urgent care day of admission and had T wave inversions in V1/V2 with reports of cp/sob so he was sent to the ED for evaluation.   *In ED, was afebrile and VSS. Repeat EKG showed in ED showed resolution of T wave inversion in V2. Trops elevated (273--289--345). D-dimer nl. CXR neg. Given ASA, started on a heparin gtt. Cardiology consulted.   *Echo showed mildly reduced EF at 45-50% and areas of WMAs; nl RV size/function.  *FLP with total cholest 186, , HDL 43, TGs 110.   *Underwent coronary angiogram on 12/16/2023, was found to have severe multivessel CAD with 95% ostial stenosis of the LAD, 90% mid-vessel stenosis of a large bifurcating OM2, and subtotal 99% serial stenoses of the RPDA.   -Underwent CABG x 3 on 12/19  -Postoperative management as per CV surgery  -Continue amlodipine 2.5 mg, aspirin 162 mg, atorvastatin 40 mg, metoprolol 12.5 mg.  -PT/OT to evaluate patient, discharge planning as per CV surgery.  Postoperative hyperglycemia  --Hemoglobin A1c is 5.6 which is normal.  -Use sliding scale if needed.  Leukocytosis  --Possibly secondary to stress of surgery, CBC in AM.  AWA on CPAP  *Chronic and stable on nocturnal CPAP      DVT Prophylaxis: Defer to primary service  Code Status: Full Code     36 MINUTES SPENT BY ME on the date of service doing chart review, history, exam, documentation & further activities per the  "note.  Disposition: Expected discharge as per primary service  Clinically Significant Risk Factors              # Hypoalbuminemia: Lowest albumin = 3 g/dL at 12/21/2023  5:27 AM, will monitor as appropriate  # Coagulation Defect: INR = 1.21 (Ref range: 0.85 - 1.15) and/or PTT = 32 Seconds (Ref range: 22 - 38 Seconds), will monitor for bleeding  # Thrombocytopenia: Lowest platelets = 115 in last 2 days, will monitor for bleeding   # Hypertension: Noted on problem list        # Overweight: Estimated body mass index is 28.59 kg/m  as calculated from the following:    Height as of this encounter: 1.75 m (5' 8.9\").    Weight as of this encounter: 87.5 kg (193 lb).      # Financial/Environmental Concerns: none   # History of CABG: noted on surgical history       Carey Philippe MD  Text Page   (7am to 6pm)    Interval History   Chest tube removed today, patient has Marcelino catheter, pain still present but well-controlled.    -Data reviewed today: I reviewed all new labs and imaging results over the last 24 hours.  Physical Exam     Vital Signs with Ranges  Temp:  [98  F (36.7  C)-100.1  F (37.8  C)] 98  F (36.7  C)  Pulse:  [64-78] 78  Resp:  [12-18] 18  BP: ()/(58-73) 98/58  SpO2:  [94 %-98 %] 94 %  I/O last 3 completed shifts:  In: 1333.94 [P.O.:940; I.V.:393.94]  Out: 1455 [Urine:1125; Chest Tube:330]    Constitutional: Awake, alert, cooperative, no apparent distress  Respiratory: Clear to auscultation bilaterally, no crackles or wheezing  Cardiovascular: Regular rate and rhythm, normal S1 and S2, and no murmur noted  GI: Normal bowel sounds, soft, non-distended, non-tender  Skin/Integumen: No rashes, no cyanosis, no edema  Neuro : moving all 4 extremities, no focal deficit noted     Medications    - MEDICATION INSTRUCTIONS -      BETA BLOCKER NOT PRESCRIBED        amLODIPine  2.5 mg Oral Daily    aspirin  162 mg Oral or NG Tube Daily    atorvastatin  40 mg Oral QPM    gabapentin  100 mg Oral TID    heparin " ANTICOAGULANT  5,000 Units Subcutaneous Q8H    insulin aspart  1-7 Units Subcutaneous TID AC    insulin aspart  1-5 Units Subcutaneous At Bedtime    metoprolol tartrate  12.5 mg Oral BID    pantoprazole  40 mg Oral or NG Tube Daily    Or    pantoprazole  40 mg Oral Daily    polyethylene glycol  17 g Oral Daily    potassium & sodium phosphates  1 packet Oral or Feeding Tube Q4H    senna-docusate  1 tablet Oral BID    sodium chloride (PF)  3 mL Intracatheter Q8H       Data   Recent Labs   Lab 12/21/23  1145 12/21/23  0753 12/21/23  0527 12/20/23  0603 12/20/23  0357 12/19/23  1429 12/19/23  1408 12/19/23  1300 12/19/23  1251   WBC  --   --  12.4*  --  11.5*  --  22.8*  --  20.4*   HGB  --   --  10.5*  --  11.3*  --  12.5*   < > 11.0*   MCV  --   --  92  --  89  --  89  --  90   PLT  --   --  115*  --  125*  --  144*  --  153   INR  --   --   --   --   --   --  1.21*  --  1.32*   NA  --   --  135  --  139  --  142   < > 139   POTASSIUM  --   --  4.1  --  4.0  --  4.0   < > 4.7   CHLORIDE  --   --  102  --  108*  --  109*  --  109*   CO2  --   --  26  --  26  --  21*  --  22   BUN  --   --  15.2  --  14.2  --  14.7  --  14.3   CR  --   --  0.97  --  0.88  --  0.95  --  0.93   ANIONGAP  --   --  7  --  5*  --  12  --  8   STEPHEN  --   --  7.9*  --  7.8*  --  8.3*  --  8.4*   * 116* 111*   < > 111*  120*   < > 188*   < > 180*   ALBUMIN  --   --  3.0*  --  3.2*  --  3.4*  --   --    PROTTOTAL  --   --  5.1*  --  5.1*  --  5.5*  --   --    BILITOTAL  --   --  0.8  --  0.6  --  0.8  --   --    ALKPHOS  --   --  29*  --  30*  --  35*  --   --    ALT  --   --  48  --  60  --  68  --   --    AST  --   --  34  --  57*  --  77*  --   --     < > = values in this interval not displayed.     Recent Labs   Lab 12/21/23  1145 12/21/23  0753 12/21/23  0527 12/20/23  2159 12/20/23  1809   * 116* 111* 140* 139*       Imaging:   No results found for this or any previous visit (from the past 24 hour(s)).

## 2023-12-21 NOTE — PROGRESS NOTES
[facility]  Cardiovascular and Thoracic Surgery Daily Note      Assessment and Plan  POD # 2 s/p Coronary artery bypass grafting x 3, -reversed saphenous vein graft to the right posterior descending coronary artery, -radial artery graft to the obtuse marginal branch of the left circumflex coronary artery, in-situ left internal mammary artery to the left anterior descending coronary artery. 2.  Endoscopic harvest of the greater saphenous vein from the left lower extremity. 3.  Endoscopic harvest of the radial artery from the left upper extremity, Epiaortic ultrasonography on 23 with Dr. Napoles    - CVS: Pre-op TTE with 45-50%, ASA, BB when able, statin, sub q heparin, transition nicardipine to amlodipine-do NOT hold unless discussed with CV surgery, will remove CTs/wires this am    - Resp: Extubated per protocol, sating well on RA<4L. IS, pulmonary toilet    - Neuro: Neuro intact, pain controlled on current regimen    - Renal: No history of significant renal disease. Cr wnl. Trend BMP. Diuretic as above.  Recent Labs   Lab 23  0357 23  1408   CR 0.97 0.88 0.95       - GI: -BM, continue bowel regimen    - : Remove Marcelino this am    - Endo: sliding scale insulin    Hemoglobin A1C   Date Value Ref Range Status   2023 5.6 <5.7 % Final     Comment:     Normal <5.7%   Prediabetes 5.7-6.4%    Diabetes 6.5% or higher     Note: Adopted from ADA consensus guidelines.   2021 5.6 0 - 5.6 % Final     Comment:     Normal <5.7% Prediabetes 5.7-6.4%  Diabetes 6.5% or higher - adopted from ADA   consensus guidelines.          - FEN: Replace electrolytes as needed. ADAT-Regular diet.     - ID: Postop leukocytosis, Temp (24hrs), Av.4  F (37.4  C), Min:98.4  F (36.9  C), Max:100.1  F (37.8  C). Afebrile. WBC reactive from surgery. Periop abx completing. Trend CBC.   Recent Labs   Lab 23  0527 23  0357 23  1408   WBC 12.4* 11.5* 22.8*       - Heme: Acute  "blood loss anemia and thrombocytopenia due to surgery. Hgb and PLT wnl. Trend CBC, transfuse PRN.   Recent Labs   Lab 12/21/23  0527 12/20/23  0357 12/19/23  1408   HGB 10.5* 11.3* 12.5*   * 125* 144*       - Proph: SCD, subcutaneous heparin, PPI    - Dispo: [facility], continue therapies, encourage IS      Interval History  Sitting up on edge of bed, ate all of breakfast, tolerating diet, working with therapies, using IS, pain controlled      Medications   amLODIPine  2.5 mg Oral Daily    aspirin  162 mg Oral or NG Tube Daily    atorvastatin  40 mg Oral QPM    gabapentin  100 mg Oral TID    heparin ANTICOAGULANT  5,000 Units Subcutaneous Q8H    insulin aspart  1-7 Units Subcutaneous TID AC    insulin aspart  1-5 Units Subcutaneous At Bedtime    metoprolol tartrate  12.5 mg Oral BID    pantoprazole  40 mg Oral or NG Tube Daily    Or    pantoprazole  40 mg Oral Daily    polyethylene glycol  17 g Oral Daily    potassium & sodium phosphates  1 packet Oral or Feeding Tube Q4H    senna-docusate  1 tablet Oral BID    sodium chloride (PF)  3 mL Intracatheter Q8H     acetaminophen **OR** acetaminophen, bisacodyl, calcium carbonate, calcium gluconate, calcium gluconate, calcium gluconate, glucose **OR** dextrose **OR** glucagon, HOLD MEDICATION, hydrALAZINE, lactated ringers, lidocaine 4%, lidocaine (buffered or not buffered), magnesium hydroxide, methocarbamol, naloxone **OR** naloxone **OR** naloxone **OR** naloxone, ondansetron **OR** ondansetron, oxyCODONE **OR** oxyCODONE, - MEDICATION INSTRUCTIONS -, prochlorperazine **OR** prochlorperazine, BETA BLOCKER NOT PRESCRIBED, sodium chloride (PF), sodium chloride (PF)      Physical Exam  Vitals were reviewed  Blood pressure 123/70, pulse 78, temperature 98.4  F (36.9  C), temperature source Oral, resp. rate 18, height 1.75 m (5' 8.9\"), weight 87.5 kg (193 lb), SpO2 95%.  Rhythm: NSR    Lungs: course    Cardiovascular: rrr, no m/r/g    Abdomen: soft, NT, ND, " +BS    Extremeties: warm, no LE edema    Incision: CDI    CT: 330<510 ml serosang output, no air leak    Weight:   Vitals:    12/17/23 0614 12/18/23 0436 12/19/23 0451 12/20/23 0600   Weight: 83.1 kg (183 lb 4.8 oz) 83.6 kg (184 lb 6.4 oz) 84.2 kg (185 lb 9.6 oz) 85.1 kg (187 lb 9.8 oz)    12/21/23 0515   Weight: 87.5 kg (193 lb)         Data  Recent Labs   Lab 12/21/23  0753 12/21/23  0527 12/20/23  2159 12/20/23  0603 12/20/23  0357 12/19/23  1429 12/19/23  1408 12/19/23  1251   WBC  --  12.4*  --   --  11.5*  --  22.8* 20.4*   HGB  --  10.5*  --   --  11.3*  --  12.5* 11.0*   MCV  --  92  --   --  89  --  89 90   PLT  --  115*  --   --  125*  --  144* 153   INR  --   --   --   --   --   --  1.21* 1.32*   NA  --  135  --   --  139  --  142 139   POTASSIUM  --  4.1  --   --  4.0  --  4.0 4.7   CHLORIDE  --  102  --   --  108*  --  109* 109*   CO2  --  26  --   --  26  --  21* 22   BUN  --  15.2  --   --  14.2  --  14.7 14.3   CR  --  0.97  --   --  0.88  --  0.95 0.93   ANIONGAP  --  7  --   --  5*  --  12 8   STEPHEN  --  7.9*  --   --  7.8*  --  8.3* 8.4*   * 111* 140*   < > 111*  120*   < > 188* 180*   ALBUMIN  --  3.0*  --   --  3.2*  --  3.4*  --    PROTTOTAL  --  5.1*  --   --  5.1*  --  5.5*  --    BILITOTAL  --  0.8  --   --  0.6  --  0.8  --    ALKPHOS  --  29*  --   --  30*  --  35*  --    ALT  --  48  --   --  60  --  68  --    AST  --  34  --   --  57*  --  77*  --     < > = values in this interval not displayed.       Imaging:  No results found for this or any previous visit (from the past 24 hour(s)).        Patient seen and discussed with Dr. Darion Lockett PA-C  Cardiothoracic Surgery  Available for paging [phone] (personal pager or CV Surgery Rounding Pager)  Personal Pager: [phone]  CV Surgery Rounding Pager: [phone]  After hours please page surgeon on-call

## 2023-12-21 NOTE — PLAN OF CARE
Pt settled from ICU around 1630. Pt. Alert and oriented x4. Vital signs stable on RA. Up SBA. Tolerating clear liquid diet, advanced to regular diet but pt does not want to eat dinner. Lung sounds clear. Bowel sounds hypoactive. Low urine output via leonardo, surgery aware per previous RN. Sternal incision well approximated, harvest sites CDI. Pt reporting pain in back with movement and chest with coughing, refusing pain meds but encouraged pt to at least try Tylenol and pt agreeable. Denies nausea. Tele SR.

## 2023-12-22 ENCOUNTER — APPOINTMENT (OUTPATIENT)
Dept: OCCUPATIONAL THERAPY | Facility: CLINIC | Age: 63
End: 2023-12-22
Payer: COMMERCIAL

## 2023-12-22 ENCOUNTER — APPOINTMENT (OUTPATIENT)
Dept: CARDIOLOGY | Facility: CLINIC | Age: 63
End: 2023-12-22
Attending: NURSE PRACTITIONER
Payer: COMMERCIAL

## 2023-12-22 ENCOUNTER — APPOINTMENT (OUTPATIENT)
Dept: GENERAL RADIOLOGY | Facility: CLINIC | Age: 63
End: 2023-12-22
Attending: PHYSICIAN ASSISTANT
Payer: COMMERCIAL

## 2023-12-22 VITALS
HEART RATE: 72 BPM | OXYGEN SATURATION: 93 % | TEMPERATURE: 98.5 F | HEIGHT: 69 IN | RESPIRATION RATE: 16 BRPM | BODY MASS INDEX: 28.32 KG/M2 | SYSTOLIC BLOOD PRESSURE: 120 MMHG | WEIGHT: 191.2 LBS | DIASTOLIC BLOOD PRESSURE: 72 MMHG

## 2023-12-22 LAB
ALBUMIN SERPL BCG-MCNC: 3.1 G/DL (ref 3.5–5.2)
ALP SERPL-CCNC: 36 U/L (ref 40–150)
ALT SERPL W P-5'-P-CCNC: 39 U/L (ref 0–70)
ANION GAP SERPL CALCULATED.3IONS-SCNC: 9 MMOL/L (ref 7–15)
AST SERPL W P-5'-P-CCNC: 26 U/L (ref 0–45)
BILIRUB SERPL-MCNC: 0.4 MG/DL
BUN SERPL-MCNC: 16.1 MG/DL (ref 8–23)
CALCIUM SERPL-MCNC: 8 MG/DL (ref 8.8–10.2)
CHLORIDE SERPL-SCNC: 106 MMOL/L (ref 98–107)
CREAT SERPL-MCNC: 0.81 MG/DL (ref 0.67–1.17)
DEPRECATED HCO3 PLAS-SCNC: 25 MMOL/L (ref 22–29)
EGFRCR SERPLBLD CKD-EPI 2021: >90 ML/MIN/1.73M2
ERYTHROCYTE [DISTWIDTH] IN BLOOD BY AUTOMATED COUNT: 12.2 % (ref 10–15)
GLUCOSE BLDC GLUCOMTR-MCNC: 105 MG/DL (ref 70–99)
GLUCOSE BLDC GLUCOMTR-MCNC: 112 MG/DL (ref 70–99)
GLUCOSE BLDC GLUCOMTR-MCNC: 120 MG/DL (ref 70–99)
GLUCOSE SERPL-MCNC: 117 MG/DL (ref 70–99)
HCT VFR BLD AUTO: 31.4 % (ref 40–53)
HGB BLD-MCNC: 10.3 G/DL (ref 13.3–17.7)
LVEF ECHO: NORMAL
MAGNESIUM SERPL-MCNC: 2 MG/DL (ref 1.7–2.3)
MCH RBC QN AUTO: 29.9 PG (ref 26.5–33)
MCHC RBC AUTO-ENTMCNC: 32.8 G/DL (ref 31.5–36.5)
MCV RBC AUTO: 91 FL (ref 78–100)
PHOSPHATE SERPL-MCNC: 2.2 MG/DL (ref 2.5–4.5)
PLATELET # BLD AUTO: 119 10E3/UL (ref 150–450)
POTASSIUM SERPL-SCNC: 3.8 MMOL/L (ref 3.4–5.3)
PROT SERPL-MCNC: 5.5 G/DL (ref 6.4–8.3)
RBC # BLD AUTO: 3.44 10E6/UL (ref 4.4–5.9)
SODIUM SERPL-SCNC: 140 MMOL/L (ref 135–145)
WBC # BLD AUTO: 11.8 10E3/UL (ref 4–11)

## 2023-12-22 PROCEDURE — 250N000013 HC RX MED GY IP 250 OP 250 PS 637: Performed by: PHYSICIAN ASSISTANT

## 2023-12-22 PROCEDURE — 85027 COMPLETE CBC AUTOMATED: CPT | Performed by: INTERNAL MEDICINE

## 2023-12-22 PROCEDURE — 36415 COLL VENOUS BLD VENIPUNCTURE: CPT | Performed by: INTERNAL MEDICINE

## 2023-12-22 PROCEDURE — 93308 TTE F-UP OR LMTD: CPT | Mod: 26 | Performed by: INTERNAL MEDICINE

## 2023-12-22 PROCEDURE — 93325 DOPPLER ECHO COLOR FLOW MAPG: CPT | Mod: 26 | Performed by: INTERNAL MEDICINE

## 2023-12-22 PROCEDURE — 80053 COMPREHEN METABOLIC PANEL: CPT | Performed by: INTERNAL MEDICINE

## 2023-12-22 PROCEDURE — 97535 SELF CARE MNGMENT TRAINING: CPT | Mod: GO | Performed by: OCCUPATIONAL THERAPIST

## 2023-12-22 PROCEDURE — 83735 ASSAY OF MAGNESIUM: CPT | Performed by: INTERNAL MEDICINE

## 2023-12-22 PROCEDURE — 71046 X-RAY EXAM CHEST 2 VIEWS: CPT

## 2023-12-22 PROCEDURE — 250N000013 HC RX MED GY IP 250 OP 250 PS 637: Performed by: INTERNAL MEDICINE

## 2023-12-22 PROCEDURE — 84100 ASSAY OF PHOSPHORUS: CPT | Performed by: INTERNAL MEDICINE

## 2023-12-22 PROCEDURE — 250N000011 HC RX IP 250 OP 636: Mod: JZ | Performed by: PHYSICIAN ASSISTANT

## 2023-12-22 PROCEDURE — 93321 DOPPLER ECHO F-UP/LMTD STD: CPT | Mod: 26 | Performed by: INTERNAL MEDICINE

## 2023-12-22 PROCEDURE — 97110 THERAPEUTIC EXERCISES: CPT | Mod: GO | Performed by: OCCUPATIONAL THERAPIST

## 2023-12-22 PROCEDURE — 93325 DOPPLER ECHO COLOR FLOW MAPG: CPT

## 2023-12-22 RX ORDER — FUROSEMIDE 20 MG
20 TABLET ORAL DAILY
Qty: 5 TABLET | Refills: 0 | Status: SHIPPED | OUTPATIENT
Start: 2023-12-22 | End: 2024-02-07

## 2023-12-22 RX ORDER — AMLODIPINE BESYLATE 2.5 MG/1
2.5 TABLET ORAL DAILY
Qty: 90 TABLET | Refills: 0 | Status: SHIPPED | OUTPATIENT
Start: 2023-12-23 | End: 2024-02-07

## 2023-12-22 RX ORDER — ASPIRIN 81 MG/1
162 TABLET, CHEWABLE ORAL DAILY
Qty: 120 TABLET | Refills: 0 | Status: SHIPPED | OUTPATIENT
Start: 2023-12-23 | End: 2024-04-02

## 2023-12-22 RX ORDER — AMOXICILLIN 250 MG
1 CAPSULE ORAL 2 TIMES DAILY PRN
Qty: 30 TABLET | Refills: 0 | Status: SHIPPED | OUTPATIENT
Start: 2023-12-22

## 2023-12-22 RX ORDER — OXYCODONE HYDROCHLORIDE 5 MG/1
5 TABLET ORAL EVERY 6 HOURS PRN
Qty: 10 TABLET | Refills: 0 | Status: SHIPPED | OUTPATIENT
Start: 2023-12-22 | End: 2024-01-08

## 2023-12-22 RX ORDER — POTASSIUM CHLORIDE 1500 MG/1
20 TABLET, EXTENDED RELEASE ORAL DAILY
Qty: 5 TABLET | Refills: 0 | Status: SHIPPED | OUTPATIENT
Start: 2023-12-22 | End: 2023-12-27

## 2023-12-22 RX ORDER — METHOCARBAMOL 750 MG/1
750 TABLET, FILM COATED ORAL EVERY 6 HOURS PRN
Qty: 60 TABLET | Refills: 0 | Status: SHIPPED | OUTPATIENT
Start: 2023-12-22 | End: 2024-01-08

## 2023-12-22 RX ORDER — ATORVASTATIN CALCIUM 40 MG/1
40 TABLET, FILM COATED ORAL EVERY EVENING
Qty: 90 TABLET | Refills: 0 | Status: SHIPPED | OUTPATIENT
Start: 2023-12-22 | End: 2024-02-01

## 2023-12-22 RX ORDER — POLYETHYLENE GLYCOL 3350 17 G/17G
17 POWDER, FOR SOLUTION ORAL DAILY PRN
Qty: 510 G | Refills: 0 | Status: SHIPPED | OUTPATIENT
Start: 2023-12-22

## 2023-12-22 RX ORDER — METOPROLOL SUCCINATE 25 MG/1
25 TABLET, EXTENDED RELEASE ORAL DAILY
Qty: 90 TABLET | Refills: 0 | Status: SHIPPED | OUTPATIENT
Start: 2023-12-22 | End: 2024-02-01

## 2023-12-22 RX ORDER — ACETAMINOPHEN 500 MG
1000 TABLET ORAL EVERY 6 HOURS PRN
COMMUNITY
Start: 2023-12-22 | End: 2024-01-08

## 2023-12-22 RX ADMIN — HEPARIN SODIUM 5000 UNITS: 5000 INJECTION, SOLUTION INTRAVENOUS; SUBCUTANEOUS at 05:36

## 2023-12-22 RX ADMIN — AMLODIPINE BESYLATE 2.5 MG: 2.5 TABLET ORAL at 08:49

## 2023-12-22 RX ADMIN — PANTOPRAZOLE SODIUM 40 MG: 40 TABLET, DELAYED RELEASE ORAL at 08:49

## 2023-12-22 RX ADMIN — POTASSIUM & SODIUM PHOSPHATES POWDER PACK 280-160-250 MG 1 PACKET: 280-160-250 PACK at 14:14

## 2023-12-22 RX ADMIN — HEPARIN SODIUM 5000 UNITS: 5000 INJECTION, SOLUTION INTRAVENOUS; SUBCUTANEOUS at 14:14

## 2023-12-22 RX ADMIN — DOCUSATE SODIUM 50 MG AND SENNOSIDES 8.6 MG 1 TABLET: 8.6; 5 TABLET, FILM COATED ORAL at 08:49

## 2023-12-22 RX ADMIN — POLYETHYLENE GLYCOL 3350 17 G: 17 POWDER, FOR SOLUTION ORAL at 08:49

## 2023-12-22 RX ADMIN — ASPIRIN 81 MG CHEWABLE TABLET 162 MG: 81 TABLET CHEWABLE at 08:49

## 2023-12-22 RX ADMIN — POTASSIUM & SODIUM PHOSPHATES POWDER PACK 280-160-250 MG 1 PACKET: 280-160-250 PACK at 11:31

## 2023-12-22 RX ADMIN — METOPROLOL TARTRATE 12.5 MG: 25 TABLET, FILM COATED ORAL at 08:49

## 2023-12-22 ASSESSMENT — ACTIVITIES OF DAILY LIVING (ADL)
ADLS_ACUITY_SCORE: 22
ADLS_ACUITY_SCORE: 22
ADLS_ACUITY_SCORE: 20
ADLS_ACUITY_SCORE: 22
ADLS_ACUITY_SCORE: 20
ADLS_ACUITY_SCORE: 22
ADLS_ACUITY_SCORE: 22

## 2023-12-22 NOTE — PLAN OF CARE
Goal Outcome Evaluation:    Pt discharged to home via wheelchair w/ [facility] transport volunteer services. Pts spouse to drive pt home. Pt educated thoroughly on discharge material. Given discharge medication and educated on how to properly take medication. Used [employer]  services and help from Mandarin speaking colleague to educate. Shower completed post CT removal. Denies pain/nausea. VSS on RA. Ambulated x 4 today. Echo completed. Questions encouraged, all questions answered.

## 2023-12-22 NOTE — PROGRESS NOTES
Occupational Therapy Discharge Summary    Reason for therapy discharge:    Discharged to home with outpatient therapy.    Progress towards therapy goal(s). See goals on Care Plan in [facility] electronic health record for goal details.  Goals partially met.  Barriers to achieving goals:   discharge from facility.    Therapy recommendation(s):    Continued therapy is recommended.  Rationale/Recommendations:  home wiht family A with strenuous IADL's ie yard work, shopping, heavier IALD's ie vacuuming, etc and OP CR at [facility] or [facility] for monitored progressieve ex and risk factor ed and modificaiton.  Continue home exercise program.

## 2023-12-22 NOTE — PROGRESS NOTES
Care Management Discharge Note    Discharge Date: 12/22/2023       Discharge Disposition: Home, Outpatient Rehab (PT, OT, SLP, Cardiac or Pulmonary)    Discharge Services: None    Discharge DME: None    Discharge Transportation: family or friend will provide    Private pay costs discussed: Not applicable    Does the patient's insurance plan have a 3 day qualifying hospital stay waiver?  No    PAS Confirmation Code:    Patient/family educated on Medicare website which has current facility and service quality ratings: no    Education Provided on the Discharge Plan:    Persons Notified of Discharge Plans:   Patient/Family in Agreement with the Plan: yes    Handoff Referral Completed: Yes    Additional Information:  Patient discharging this afternoon.  Follow up PCP appointment made for 12/28 (he had a PCP appointment on 2/1 previously scheduled) and on AVS; also has cardiology follow up appointments..    Outpatient cardiac rehab referral also ordered.    Shraddha Garrett RN  Inpatient Float Care Coordinator  [facility]

## 2023-12-22 NOTE — PLAN OF CARE
"Pt. Alert and oriented x4. Vital signs stable on RA. Up SBA, up ambulating with nursing this AM. Tolerating reg diet. Lung sounds clear. Bowel sounds active. Marcelino removed this AM, voiding adequately on his own. Sternal incision well approximated, harvest sites CDI. Pt reporting pain in back with movement and chest with coughing, taking Tylenol and refusing Robaxin going forward as it \"makes him feel weird.\" Denies nausea. Tele SR. Replaced phos, recheck in AM.      "

## 2023-12-22 NOTE — DISCHARGE INSTRUCTIONS
AFTER YOU GO HOME FROM YOUR HEART SURGERY  (Coronary bypass grafting x3 with Dr. Desouza)    You had a sternotomy, avoid lifting, pushing, or pulling anything greater than ten pounds for 8 weeks after surgery and then less than 20-30 pounds for an additional 4 weeks. Do not reach backwards or use arms to push out of chair. Do not let people pull on your arms to assist with standing. Avoid twisting or reaching too far across your body.  Avoid strenuous activities such as bowling, vacuuming, raking, shoveling, golf or tennis for 12 weeks after your surgery. Splint your chest incision by hugging a pillow or bringing your arms across your chest when coughing or sneezing. Please try to sleep on your back for the first 4-6 weeks to avoid extra stress on your sternum (breastbone) while it is healing.     No driving for 4 weeks after surgery or while on pain medication.    Shower or wash your incisions daily with antibacterial soap and water (or as instructed), pat dry. Keep wound clean and dry, showers are okay after discharge, but don't let spray hit directly on incision. No baths or swimming for 6 weeks and/or until incisions are completely healed over. Cover chest tube sites with gauze until they stop draining, then leave open to air. It is normal for chest tube sites to drain fluid for up to 2-3 weeks after surgery. It is not normal to have drainage from other incisions.   Watch for signs of infection: increased redness, tenderness, warmth or any drainage from sternum incision.  Also a temperature > 100.5 F or chills. Call your surgeon or primary care provider's office immediately.     Exercise is very important in your recovery. Please follow the guidelines set up for you in your cardiac rehab classes at the hospital. If outpatient cardiac rehab was ordered for you, we highly recommend you participate. If you have problems arranging your cardiac rehab, please call [phone] for all locations, with the exception of  [place], please call [phone] and [place], please call [phone].      Avoid sitting for prolonged periods of time, try to walk every hour during the day. If you have a leg incision, elevate your leg often when you are not walking.    Check your weight when you get home from the hospital and continue to check it daily through your recovery for at least a month. If you notice a weight gain of 2-3 pounds in a week, notify your primary care physician, cardiologist or surgeon.    Bowel activity may be slow after surgery. If necessary, you may take an over the counter laxative such as Milk of Magnesia or Miralax. You may have stool softeners prescribed (docusate sodium, Senokot). We recommend using stool softeners while using narcotics for pain (oxycodone/percocet, hydrocodone/vicodin, hydromorphone/dilaudid).      Wean OFF of narcotics (oxycodone, dilaudid, hydrocodone) as soon as possible. You should continue taking acetaminophen as long as you have any surgical pain as the first choice for pain control and add narcotics as necessary for pain to be tolerable.      DO NOT SMOKE.  IF YOU NEED HELP QUITTING, PLEASE TALK WITH YOUR CARDIOLOGIST OR PRIMARY DOCTOR.      REGARDING PRESCRIPTION REFILLS.  If you need a refill on your pain medication contact us to discuss your pain and a possible one time refill.   All other medications will be adjusted, discontinued and re-filled by your primary care physician and/or your cardiologist as they were prior to your surgery. We have given you enough for one to three month with possibly one refill. You may have refills available to you for some of your medications through the [facility]. If you would like your refills sent to a different a different pharmacy, please contact your preferred pharmacy and let them know that you have prescriptions at [facility] that you would like transferred.    POST-OPERATIVE CLINIC VISITS  You have a follow up visit  with CV Surgery Advance Care Practitioners at the [facility] at [facility] in [place].    If you need to cancel or reschedule your cardiology appointments please call [phone].     SURGICAL QUESTIONS  Please call our nurse coordinators, Debbie and Betsey, at [phone] with questions or concerns related to surgery. For other non-urgent questions and requests, our nurse coordinators can also be reached via email; Debbie at [email or web address] and Betsey at [email or web address]    On weekends or after hours, please call [phone] and ask the  to page the Cardiothoracic Surgeon on-call.      Thank you,    Your Cardiothoracic Surgery Team

## 2023-12-22 NOTE — DISCHARGE SUMMARY
[facility]  Cardiothoracic Surgery Hospital Discharge Summary     Drew Bradford MRN# [id number]   Age: 63 year old YOB: 1960     Admitting Physician:  Oksana Napoles MD  Discharge Physician:  Avril Michaels NP  Primary Care Physician:        Tara Mehta     DATE OF ADMISSION: 12/15/2023      DATE OF DISCHARGE: December 22, 2023     Admit Wt: 189 lbs  Discharge Wt: 191 lbs          Primary Diagnoses:   NSTEMI and severe multivessel coronary artery disease s/p coronary artery bypass grafting x3          Secondary Diagnoses:   Acute postoperative pain, improving  Stress induced hyperglycemia, resolved  Stress induced leukocytosis, resolved  Acute blood loss anemia, stable  Acute blood loss thrombocytopenia, resolved  Hypervolemia, improving  AWA    PROCEDURES PERFORMED:   Date: 12/19/23.  Surgeon: Dr. Ruben Napoles  1.  Coronary artery bypass grafting x 3   -reversed saphenous vein graft to the right posterior descending coronary artery  -radial artery graft to the obtuse marginal branch of the left circumflex coronary artery  -in-situ left internal mammary artery to the left anterior descending coronary artery.  2.  Endoscopic harvest of the greater saphenous vein from the left lower extremity.  3.  Endoscopic harvest of the radial artery from the left upper extremity  4.  Epiaortic ultrasonography    INTRAOPERATIVE FINDINGS:    The left internal mammary artery was 2 mm in diameter and had excellent flow.  The greater saphenous vein from the left lower extremity was 2 - 3 mm in diameter and a suitable conduit for bypass.    The radial artery from the left upper extremity was 2 - 3 mm in diameter and a suitable conduit for bypass     The ascending aorta was free of calcified plaque.      The right posterior descending coronary artery was 2 mm in diameter and free of disease at the site of anastomosis.   The obtuse marginal branch of the left circumflex coronary artery was 2.25 mm  in diameter and free of disease at the site of anastomosis.   The left anterior descending coronary artery was 2 mm in diameter and free of disease at the site of anastomosis.    After reperfusion and defibrillation, sinus rhythm was restored.    Left ventricular function was normal preoperatively and unchanged after bypass on low-dose inotropic support.       PATHOLOGY RESULTS:    none     CULTURE RESULTS:    none    CONSULTS:    PT/OT  Intensivist    BRIEF HISTORY OF ILLNESS:    Drew Bradford is a 63-year-old male who presented with an NSTEMI. Coronary angiography demonstrated severe multivessel coronary artery disease. Echocardiography demonstrated mildly reduced left ventricular function and no significant valvular abnormality. The patient understood the risks and benefits of the procedure and wished to undergo the operation.     HOSPITAL COURSE: Drew Bradford is a 63 year old male who on 12/15/2023 underwent the above-named procedures and tolerated the operation well.     Postoperatively was admitted to the ICU.  Patient was extubated within protocol on POD #0.  Blood pressure and cardiac index were managed with vasopressors and inotropic agents which were continuously weaned until no longer needed.  Patient was subsequently  transferred to the surgical telemetry floor.    While on the surgical unit, the patient continued to progress well. Chest tubes and temporary pacemaker wires were removed when deemed appropriate.     Patient was fluid overloaded and treated with diuretics. They remain up about 2 lbs from preoperative weight and will discharge with 5 days of diuretic therapy and potassium supplementation; will re-evaluate need in clinic follow-up.      Patient was transiently hyperglycemic and treated with insulin infusion then transitioned to sliding scale insulin per protocol. Blood sugars remained stable. No further glycemic control agents needed at this time.     Prior to discharge, his pain was controlled  "well, he was working well with therapies, able to perform most ADLs, ambulate without difficulty, and had full return of bowel and bladder function.  On December 22, 2023, he was discharged to home in stable condition. Follow up with cardiology and cardiac surgery have been arranged. Pt encouraged to follow up with PCP and cardiac rehab upon discharge.    Patient discharged on aspirin:  Yes 162 mg  Patient discharged on beta blocker: yes    Patient discharged on ACE Inhibitor/ARB: no      Patient discharged on statin: yes   Radial artery graft: continue amlodipine for minimum of 3 months uninterrupted for prevention of radial artery graft spasm         Discharge Disposition:     Discharged to home            Condition on Discharge:     Discharge condition: Stable   Discharge vitals: Blood pressure 120/72, pulse 72, temperature 98.5  F (36.9  C), temperature source Oral, resp. rate 16, height 1.75 m (5' 8.9\"), weight 86.7 kg (191 lb 3.2 oz), SpO2 93%.   Code status on discharge: Full Code     Vitals:    12/20/23 0600 12/21/23 0515 12/22/23 0524   Weight: 85.1 kg (187 lb 9.8 oz) 87.5 kg (193 lb) 86.7 kg (191 lb 3.2 oz)       DAY OF DISCHARGE PHYSICAL EXAM:    Gen: A&Ox4, NAD  Neuro: Intact with no focal deficits   CV: RRR, normal S1 S2, no murmurs, rubs or gallops. no JVD  Pulm: CTA, no wheezing or rhonchi, normal breathing on RA  Abd: nondistended, normal BS, soft, nontender  Ext: no peripheral edema, no pitting  Incision: clean, dry, intact, no erythema, sternum stable  Tubes/drain sites: dressing clean and dry    LABS  Most Recent 3 CBC's:  Recent Labs   Lab Test 12/22/23  0533 12/21/23  0527 12/20/23  0357   WBC 11.8* 12.4* 11.5*   HGB 10.3* 10.5* 11.3*   MCV 91 92 89   * 115* 125*      Most Recent 3 BMP's:  Recent Labs   Lab Test 12/22/23  0743 12/22/23  0533 12/21/23  1718 12/21/23  0753 12/21/23  0527 12/20/23  0603 12/20/23  0357   NA  --  140  --   --  135  --  139   POTASSIUM  --  3.8  --   --  4.1  " --  4.0   CHLORIDE  --  106  --   --  102  --  108*   CO2  --  25  --   --  26  --  26   BUN  --  16.1  --   --  15.2  --  14.2   CR  --  0.81  --   --  0.97  --  0.88   ANIONGAP  --  9  --   --  7  --  5*   STEPHEN  --  8.0*  --   --  7.9*  --  7.8*   * 117* 125*   < > 111*   < > 111*  120*    < > = values in this interval not displayed.     Most Recent 2 LFT's:  Recent Labs   Lab Test 23  0533 23  0527   AST 26 34   ALT 39 48   ALKPHOS 36* 29*   BILITOTAL 0.4 0.8     Most Recent INR's and Anticoagulation Dosing History:  Anticoagulation Dose History          Latest Ref Rng & Units 2023   Recent Dosing and Labs   INR 0.85 - 1.15 1.21  1.32      Most Recent 3 Troponin's:No lab results found.  Most Recent Cholesterol Panel:  Recent Labs   Lab Test 23  0636   CHOL 186   *   HDL 43   TRIG 110     Most Recent 6 Bacteria Isolates From Any Culture (See EPIC Reports for Culture Details):No lab results found.  Most Recent TSH, T4 and A1c Labs:  Recent Labs   Lab Test 23  0705 23  1054   TSH  --  2.24   A1C 5.6 5.5      Recent Labs   Lab 23  0743 23  0533 23  1718 23  1145 23  0753 23  0527   * 117* 125* 131* 116* 111*       Imagin23 CXR:    MPRESSION: Interval removal of the right central venous catheter,  mediastinal drains and left chest tube. No convincing pneumothorax.  Improved left trace pleural effusion and atelectasis. Trace right  pleural effusion. No new focal consolidation. Similar  cardiomediastinal silhouette and median sternotomy.    PRE-ADMISSION MEDICATIONS:  Medications Prior to Admission   Medication Sig Dispense Refill Last Dose    [DISCONTINUED] amLODIPine (NORVASC) 10 MG tablet Take 1 tablet (10 mg) by mouth daily Take 10 mg by mouth daily 90 tablet 1 12/15/2023 at am       DISCHARGE MEDICATIONS:   Current Discharge Medication List        START taking these medications    Details   acetaminophen (TYLENOL)  500 MG tablet Take 2 tablets (1,000 mg) by mouth every 6 hours as needed for mild pain or other (and adjunct with moderate or severe pain or per patient request)    Associated Diagnoses: S/P CABG (coronary artery bypass graft)      aspirin (ASA) 81 MG chewable tablet Take 2 tablets (162 mg) by mouth daily  Qty: 120 tablet, Refills: 0    Associated Diagnoses: S/P CABG (coronary artery bypass graft)      atorvastatin (LIPITOR) 40 MG tablet Take 1 tablet (40 mg) by mouth every evening  Qty: 90 tablet, Refills: 0    Associated Diagnoses: S/P CABG (coronary artery bypass graft)      furosemide (LASIX) 20 MG tablet Take 1 tablet (20 mg) by mouth daily for 5 days  Qty: 5 tablet, Refills: 0    Associated Diagnoses: S/P CABG (coronary artery bypass graft)      methocarbamol (ROBAXIN) 750 MG tablet Take 1 tablet (750 mg) by mouth every 6 hours as needed for muscle spasms or other (pain)  Qty: 60 tablet, Refills: 0    Associated Diagnoses: S/P CABG (coronary artery bypass graft)      metoprolol succinate ER (TOPROL XL) 25 MG 24 hr tablet Take 1 tablet (25 mg) by mouth daily  Qty: 90 tablet, Refills: 0    Associated Diagnoses: S/P CABG (coronary artery bypass graft)      oxyCODONE (ROXICODONE) 5 MG tablet Take 1 tablet (5 mg) by mouth every 6 hours as needed for moderate to severe pain  Qty: 10 tablet, Refills: 0    Associated Diagnoses: S/P CABG (coronary artery bypass graft)      polyethylene glycol (MIRALAX) 17 GM/Dose powder Take 17 g by mouth daily as needed for constipation  Qty: 510 g, Refills: 0    Associated Diagnoses: S/P CABG (coronary artery bypass graft)      potassium chloride ER (KLOR-CON M) 20 MEQ CR tablet Take 1 tablet (20 mEq) by mouth daily for 5 days  Qty: 5 tablet, Refills: 0    Comments: Take with lasix  Associated Diagnoses: S/P CABG (coronary artery bypass graft)      senna-docusate (SENOKOT-S/PERICOLACE) 8.6-50 MG tablet Take 1 tablet by mouth 2 times daily as needed for constipation  Qty: 30 tablet,  Refills: 0    Associated Diagnoses: S/P CABG (coronary artery bypass graft)           CONTINUE these medications which have CHANGED    Details   amLODIPine (NORVASC) 2.5 MG tablet Take 1 tablet (2.5 mg) by mouth daily  Qty: 90 tablet, Refills: 0    Comments: Give for a minimum of 3 months uninterrupted for prevention of radial artery graft spasm  Associated Diagnoses: S/P CABG (coronary artery bypass graft)              CC:Tara Bustamante Kyle      [facility]   Cardiothoracic Surgery  Office phone: [phone]  Office fax: [phone]

## 2023-12-22 NOTE — PLAN OF CARE
Goal Outcome Evaluation:    Orientations: A&O x4, mandarin speaking.  Vitals/Pain: VSS on RA ex temp 101.5, improved w/ tylenol  Tele: NSR  Lines/Drains: PIV x2 SL  Skin/Wounds: Sternal incision, LUE, and LLE harvest sites WNL. Dressing with small amount of drainage to CT removal sites.  GI/: AUOP. No BM  Labs: K/mag/phos protocols  Ambulation/Assist: SBA  Sleep Quality: Good

## 2023-12-24 LAB
ATRIAL RATE - MUSE: 82 BPM
DIASTOLIC BLOOD PRESSURE - MUSE: NORMAL MMHG
INTERPRETATION ECG - MUSE: NORMAL
P AXIS - MUSE: 79 DEGREES
PR INTERVAL - MUSE: 182 MS
QRS DURATION - MUSE: 84 MS
QT - MUSE: 360 MS
QTC - MUSE: 420 MS
R AXIS - MUSE: 85 DEGREES
SYSTOLIC BLOOD PRESSURE - MUSE: NORMAL MMHG
T AXIS - MUSE: 70 DEGREES
VENTRICULAR RATE- MUSE: 82 BPM

## 2023-12-26 ENCOUNTER — PATIENT OUTREACH (OUTPATIENT)
Dept: CARE COORDINATION | Facility: CLINIC | Age: 63
End: 2023-12-26
Payer: COMMERCIAL

## 2023-12-26 ENCOUNTER — TELEPHONE (OUTPATIENT)
Dept: CARDIOLOGY | Facility: CLINIC | Age: 63
End: 2023-12-26
Payer: COMMERCIAL

## 2023-12-26 ASSESSMENT — ACTIVITIES OF DAILY LIVING (ADL): DEPENDENT_IADLS:: INDEPENDENT

## 2023-12-26 NOTE — PROGRESS NOTES
Clinic Care Coordination Contact  [facility]/Voicemail    Clinical Data: Care Coordinator Outreach    Outreach Documentation Number of Outreach Attempt   12/26/2023   8:45 AM 1     Left message on patient's voicemail with call back information and requested return call.    Plan: Care Coordinator will try to reach patient again in 1-2 business days.     Minerva Villa RN, BSN, PHN  Primary Care / Care Coordinator   [facility]  E-mail [email or web address]   [phone]

## 2023-12-26 NOTE — TELEPHONE ENCOUNTER
Left message with request for call back for post hospital discharge check in. Done with the assistance of Mandarin phone     DIONNE MENDIETA RN

## 2023-12-27 ENCOUNTER — TELEPHONE (OUTPATIENT)
Dept: CARDIOLOGY | Facility: CLINIC | Age: 63
End: 2023-12-27
Payer: COMMERCIAL

## 2023-12-27 ENCOUNTER — PATIENT OUTREACH (OUTPATIENT)
Dept: CARE COORDINATION | Facility: CLINIC | Age: 63
End: 2023-12-27
Payer: COMMERCIAL

## 2023-12-27 DIAGNOSIS — Z95.1 S/P CABG (CORONARY ARTERY BYPASS GRAFT): ICD-10-CM

## 2023-12-27 ASSESSMENT — ACTIVITIES OF DAILY LIVING (ADL): DEPENDENT_IADLS:: INDEPENDENT

## 2023-12-27 NOTE — LETTER
[facility] CARE COORDINATION  [address]  [place] [address]    December 27, 2023    Drew Welsh  [address] [place] [address]      Dear Drew,    I am a clinic care coordinator who works with Tara Mehta MD with the [facility]. I recently tried to call and was unable to reach you. Below is a description of clinic care coordination and how I can further assist you.       The clinic care coordination team is made up of a registered nurse, , financial resource worker and community health worker who understand the health care system. The goal of clinic care coordination is to help you manage your health and improve access to the health care system. Our team works alongside your provider to assist you in determining your health and social needs. We can help you obtain health care and community resources, providing you with necessary information and education. We can work with you through any barriers and develop a care plan that helps coordinate and strengthen the communication between you and your care team.  Our services are voluntary and are offered without charge to you personally.    Please feel free to contact me with any questions or concerns regarding care coordination and what we can offer.      We are focused on providing you with the highest-quality healthcare experience possible.    Sincerely,      Minerva Villa RN, BSN, PHN  Primary Care / Care Coordinator   [facility]  E-mail [email or web address]   [phone]

## 2023-12-27 NOTE — TELEPHONE ENCOUNTER
Cardiovascular Surgery  [facility]    DISCHARGE FOLLOW UP PHONE CALL    Done with Mandarin     POST OP MONITORING  How is your pain on a 0-10 scale, how are you managing your pain? Not in pain but he is wondering how long pain can go on for. Explained that it can go on for a couple of weeks. Discussed adequate pain control means that he can rest at night and do his daily activities including deep breathing. He states that he is comfortable and he feels his recovery is going well.    ACTIVITY  How is your activity tolerance? Getting at least 30 minutes per day.  Are you using your incentive spirometer? 6747-5308 ml consistently  Are you still doing sternal precautions? yes  Do you hear any clicking when you are moving or taking a deep breath? no    Are you weighing yourself daily? Weight is going down very slowly.   Are you monitoring your blood pressure and heart rate? Today was 139/94 HR 80    SIGNS AND SYMPTOMS OF INFECTION    Washing daily, healing well per patient.    ASSISTANCE  Do you have someone at home to assist you with your daily activities? yes    MEDICATIONS  Is someone helping you to set up your medications? no  Do you have any questions about your medications? Patient states that he changed his amlodipine dosing to 7.5mg daily as of yesterday. Advised not to make dosing changes without discussing with us in the future. Change seems appropriate at this time given BP of 139/94 today. Asked him to please call us if he thinks he needs additional changes to his medications. He agrees.     Are you on a blood thinner? no      FOLLOW UP  You are scheduled to see your primary care physician on 12/28.  You are scheduled to see our surgery advanced practive provider for post operative follow up on 1/8.  You are scheduled for cardiac rehab on 1/10.  You are scheduled to see your cardiologist on 2/2.      CONTACT INFORMATION  Please feel free to call us with any questions or symptoms  that are concerning for you at [phone]. If it is after 4:00 in the afternoon, or a weekend please call [phone] and ask for the on call specialist. We want to do everything we can to help prevent you needing to return to the hospital, so please do not hesitate to call us.      DIONNE MENDIETA RN  Care Coordinator - [facility] CV Surgery   [facility]  [phone]

## 2023-12-27 NOTE — PROGRESS NOTES
Clinic Care Coordination Contact  [facility]/Voicemail    Clinical Data: Care Coordinator Outreach    Outreach Documentation Number of Outreach Attempt   12/26/2023   8:45 AM 1   12/27/2023   3:51 PM 2     Left message on patient's voicemail with call back information and requested return call.    Plan: Care Coordinator will send care coordination introduction letter with care coordinator contact information and explanation of care coordination services via [facility]. Care Coordinator will do no further outreaches at this time.     Minerva Villa RN, BSN, PHN  Primary Care / Care Coordinator   [facility]  E-mail [email or web address]   [phone]

## 2023-12-28 ENCOUNTER — OFFICE VISIT (OUTPATIENT)
Dept: FAMILY MEDICINE | Facility: CLINIC | Age: 63
End: 2023-12-28
Payer: COMMERCIAL

## 2023-12-28 VITALS
BODY MASS INDEX: 28.39 KG/M2 | HEIGHT: 69 IN | WEIGHT: 191.7 LBS | RESPIRATION RATE: 16 BRPM | HEART RATE: 67 BPM | SYSTOLIC BLOOD PRESSURE: 106 MMHG | TEMPERATURE: 97.5 F | OXYGEN SATURATION: 98 % | DIASTOLIC BLOOD PRESSURE: 73 MMHG

## 2023-12-28 DIAGNOSIS — I10 ESSENTIAL HYPERTENSION: ICD-10-CM

## 2023-12-28 DIAGNOSIS — Z09 HOSPITAL DISCHARGE FOLLOW-UP: Primary | ICD-10-CM

## 2023-12-28 DIAGNOSIS — E78.5 HYPERLIPIDEMIA LDL GOAL <100: ICD-10-CM

## 2023-12-28 DIAGNOSIS — Z95.1 S/P CABG (CORONARY ARTERY BYPASS GRAFT): ICD-10-CM

## 2023-12-28 LAB
BASOPHILS # BLD AUTO: 0 10E3/UL (ref 0–0.2)
BASOPHILS NFR BLD AUTO: 0 %
EOSINOPHIL # BLD AUTO: 0.2 10E3/UL (ref 0–0.7)
EOSINOPHIL NFR BLD AUTO: 2 %
ERYTHROCYTE [DISTWIDTH] IN BLOOD BY AUTOMATED COUNT: 11.8 % (ref 10–15)
HCT VFR BLD AUTO: 36.1 % (ref 40–53)
HGB BLD-MCNC: 11.7 G/DL (ref 13.3–17.7)
IMM GRANULOCYTES # BLD: 0.1 10E3/UL
IMM GRANULOCYTES NFR BLD: 1 %
LYMPHOCYTES # BLD AUTO: 1.1 10E3/UL (ref 0.8–5.3)
LYMPHOCYTES NFR BLD AUTO: 11 %
MCH RBC QN AUTO: 29.9 PG (ref 26.5–33)
MCHC RBC AUTO-ENTMCNC: 32.4 G/DL (ref 31.5–36.5)
MCV RBC AUTO: 92 FL (ref 78–100)
MONOCYTES # BLD AUTO: 0.7 10E3/UL (ref 0–1.3)
MONOCYTES NFR BLD AUTO: 7 %
NEUTROPHILS # BLD AUTO: 8.1 10E3/UL (ref 1.6–8.3)
NEUTROPHILS NFR BLD AUTO: 79 %
PLATELET # BLD AUTO: 315 10E3/UL (ref 150–450)
RBC # BLD AUTO: 3.91 10E6/UL (ref 4.4–5.9)
WBC # BLD AUTO: 10.2 10E3/UL (ref 4–11)

## 2023-12-28 PROCEDURE — 99215 OFFICE O/P EST HI 40 MIN: CPT | Mod: 24 | Performed by: PHYSICIAN ASSISTANT

## 2023-12-28 PROCEDURE — 85025 COMPLETE CBC W/AUTO DIFF WBC: CPT | Performed by: PHYSICIAN ASSISTANT

## 2023-12-28 PROCEDURE — 80048 BASIC METABOLIC PNL TOTAL CA: CPT | Performed by: PHYSICIAN ASSISTANT

## 2023-12-28 PROCEDURE — 36415 COLL VENOUS BLD VENIPUNCTURE: CPT | Performed by: PHYSICIAN ASSISTANT

## 2023-12-28 RX ORDER — AMLODIPINE BESYLATE 2.5 MG/1
2.5 TABLET ORAL DAILY
Qty: 90 TABLET | Refills: 1 | Status: SHIPPED | OUTPATIENT
Start: 2023-12-28 | End: 2024-04-02

## 2023-12-28 RX ORDER — AMLODIPINE BESYLATE 5 MG/1
5 TABLET ORAL DAILY
Qty: 90 TABLET | Refills: 1 | Status: SHIPPED | OUTPATIENT
Start: 2023-12-28 | End: 2024-04-02

## 2023-12-28 RX ORDER — AMLODIPINE BESYLATE 2.5 MG/1
2.5 TABLET ORAL DAILY
Qty: 90 TABLET | Refills: 0 | Status: CANCELLED | OUTPATIENT
Start: 2023-12-28

## 2023-12-28 ASSESSMENT — PAIN SCALES - GENERAL: PAINLEVEL: NO PAIN (0)

## 2023-12-28 NOTE — PROGRESS NOTES
"  Assessment & Plan     Hospital discharge follow-up  S/P CABG (coronary artery bypass graft)  Hyperlipidemia LDL goal <100  Essential hypertension    Reviewed coronary angiography with patient and wife. Discussed procedure and expectations. Upcoming cardiology appointment + rehab. Close BP monitoring. Needs new cuff - opt for bicep cuff. Recommended continuing same dose of Metoprolol 25 mg + Amlodipine 7.5 mg (2.5 + 7). Option to increase back to 10 mg which was pre-hospital dose as needed.     - CBC with platelets and differential  - Basic metabolic panel  (Ca, Cl, CO2, Creat, Gluc, K, Na, BUN)  - amLODIPine (NORVASC) 5 MG tablet  Dispense: 90 tablet; Refill: 1  - amLODIPine (NORVASC) 2.5 MG tablet  Dispense: 90 tablet; Refill: 1  - Home Blood Pressure Monitor Order for DME - ONLY FOR DME    40 minutes spent by me on the date of the encounter doing chart review, review of test results, interpretation of tests, patient visit, and documentation      MED REC REQUIRED  Post Medication Reconciliation Status: discharge medications reconciled and changed, per note/orders  BMI:   Estimated body mass index is 28.39 kg/m  as calculated from the following:    Height as of this encounter: 1.75 m (5' 8.9\").    Weight as of this encounter: 87 kg (191 lb 11.2 oz).   Weight management plan: Discussed healthy diet and exercise guidelines    The likelihood of other entities in the differential is insufficient to justify any further testing for them at this time. This was explained to the patient. The patient was advised that persistent or worsening symptoms would require further evaluation. Patient advised to call the office and if unable to reach to go to the emergency room if they develop any new or worsening symptoms. Expressed understanding and agreement with above stated plan.     INGRID Williamson [facility] MARLA Russell is a 63 year old, presenting for the following health " "issues:  Hospital F/U    Here today with his wife for hospital discharge follow-up.   Interpretor services utilized.     Doing well since returning home - has been monitoring BP closely and titrating Amlodipine.   Now up to 7.5 mg. Also on Metoprolol. Readings at home in 130s/80s with wrist cuff. 100-125s/60s-70s here in office with manual. ~20 pts higher on wrist cuff.     -Upcoming cardiac rehab and cardiology appt. No CP, SOB.   -Requesting demonstration of bypass grafting     Hospital Follow-up Visit:    Hospital/Nursing Home/IP Rehab Facility: [facility]  Date of Admission: 12/15/2023  Date of Discharge: 12/22/2023  Reason(s) for Admission:     NSTEMI and severe multivessel coronary artery disease s/p coronary artery bypass grafting x3   Acute postoperative pain, improving  Stress induced hyperglycemia, resolved  Stress induced leukocytosis, resolved  Acute blood loss anemia, stable  Acute blood loss thrombocytopenia, resolved  Hypervolemia, improving  AWA      Was your hospitalization related to COVID-19? No   Problems taking medications regularly:  None  Medication changes since discharge: Updated in med list   Problems adhering to non-medication therapy:  None    Summary of hospitalization:  [facility] discharge summary reviewed  Diagnostic Tests/Treatments reviewed.  Follow up needed: cardiology, rehab, pcp  Other Healthcare Providers Involved in Patient s Care:         pcp, cards, rehab  Update since discharge: improved.         Plan of care communicated with patient                 Review of Systems   Constitutional, HEENT, cardiovascular, pulmonary, GI, , musculoskeletal, neuro, skin, endocrine and psych systems are negative, except as otherwise noted.      Objective    /73 (BP Location: Left arm, Patient Position: Sitting, Cuff Size: Adult Regular)   Pulse 67   Temp 97.5  F (36.4  C) (Temporal)   Resp 16   Ht 1.75 m (5' 8.9\")   Wt 87 kg (191 lb 11.2 " oz)   SpO2 98%   BMI 28.39 kg/m    Body mass index is 28.39 kg/m .  Physical Exam   GENERAL: healthy, alert and no distress  EYES: Eyes grossly normal to inspection, PERRL and conjunctivae and sclerae normal  NECK: no adenopathy, no asymmetry, masses, or scars and thyroid normal to palpation  RESP: lungs clear to auscultation - no rales, rhonchi or wheezes  CV: regular rate and rhythm, normal S1 S2, no S3 or S4, no murmur, click or rub, no peripheral edema  ABDOMEN: soft, nontender, no hepatosplenomegaly, no masses  MS: no gross musculoskeletal defects noted, no edema  SKIN: Surgical scars well healing no signs of infection, no additional suspicious lesions or rashes  NEURO: Normal strength and tone, mentation intact and speech normal  PSYCH: mentation appears normal, affect normal/bright

## 2023-12-29 LAB
ANION GAP SERPL CALCULATED.3IONS-SCNC: 8 MMOL/L (ref 7–15)
BUN SERPL-MCNC: 21.1 MG/DL (ref 8–23)
CALCIUM SERPL-MCNC: 8.5 MG/DL (ref 8.8–10.2)
CHLORIDE SERPL-SCNC: 104 MMOL/L (ref 98–107)
CREAT SERPL-MCNC: 1.01 MG/DL (ref 0.67–1.17)
DEPRECATED HCO3 PLAS-SCNC: 27 MMOL/L (ref 22–29)
EGFRCR SERPLBLD CKD-EPI 2021: 83 ML/MIN/1.73M2
GLUCOSE SERPL-MCNC: 114 MG/DL (ref 70–99)
POTASSIUM SERPL-SCNC: 4.7 MMOL/L (ref 3.4–5.3)
SODIUM SERPL-SCNC: 139 MMOL/L (ref 135–145)

## 2023-12-29 NOTE — RESULT ENCOUNTER NOTE
Can we call and let them know blood work is stable! Blood counts improving following surgery. No change in plan from yesterday!

## 2024-01-02 ENCOUNTER — TELEPHONE (OUTPATIENT)
Dept: FAMILY MEDICINE | Facility: CLINIC | Age: 64
End: 2024-01-02
Payer: COMMERCIAL

## 2024-01-02 NOTE — TELEPHONE ENCOUNTER
Neftali Ferro,    We called and spoke to patient via  services, relayed your message below. Patient had an additional question in regards of his blood sugar. Patient states that blood sugar levels are still elevated, when will blood sugar will get back to normal after surgery. Please advise.       Muna LEE MA on 1/2/2024 at 2:26 PM

## 2024-01-02 NOTE — TELEPHONE ENCOUNTER
----- Message from Pillo Fabian PA-C sent at 12/29/2023  3:20 PM CST -----  Can we call and let them know blood work is stable! Blood counts improving following surgery. No change in plan from yesterday!

## 2024-01-02 NOTE — TELEPHONE ENCOUNTER
Routing to team to please call patient with normal result note from provider.    Please route back to triage if anything further is needed.    Thank you!    Mirza Phoenix RN  [employer]

## 2024-01-02 NOTE — TELEPHONE ENCOUNTER
Usually it returns quite quickly.  I am not sure what surgery the patient had but they can certainly do another glucose in a week.    Thank you    Tez Scruggs M.D.     no

## 2024-01-04 NOTE — TELEPHONE ENCOUNTER
Relayed providers message to patient with Mandarin Interpretor.       Patient prefers to recheck Glucose at upcoming appt with PCP.     Routing to PCP as FYI.

## 2024-01-08 ENCOUNTER — OFFICE VISIT (OUTPATIENT)
Dept: CARDIOLOGY | Facility: CLINIC | Age: 64
End: 2024-01-08
Payer: COMMERCIAL

## 2024-01-08 VITALS
BODY MASS INDEX: 28.14 KG/M2 | OXYGEN SATURATION: 99 % | SYSTOLIC BLOOD PRESSURE: 122 MMHG | DIASTOLIC BLOOD PRESSURE: 79 MMHG | HEART RATE: 70 BPM | WEIGHT: 190 LBS | HEIGHT: 69 IN

## 2024-01-08 DIAGNOSIS — Z95.1 S/P CABG (CORONARY ARTERY BYPASS GRAFT): Primary | ICD-10-CM

## 2024-01-08 PROCEDURE — 99024 POSTOP FOLLOW-UP VISIT: CPT | Performed by: PHYSICIAN ASSISTANT

## 2024-01-08 RX ORDER — ATORVASTATIN CALCIUM 40 MG/1
40 TABLET, FILM COATED ORAL DAILY
Qty: 30 TABLET | Refills: 3 | Status: SHIPPED | OUTPATIENT
Start: 2024-01-08 | End: 2024-04-02

## 2024-01-08 NOTE — PROGRESS NOTES
CV Surgery Post Op Follow Up Note:     S:  From discharge summary: Drew Bradford is a 63 year old male who on 12/15/2023 underwent the above-named procedures and tolerated the operation well.      Postoperatively was admitted to the ICU.  Patient was extubated within protocol on POD #0.  Blood pressure and cardiac index were managed with vasopressors and inotropic agents which were continuously weaned until no longer needed.  Patient was subsequently  transferred to the surgical telemetry floor.     While on the surgical unit, the patient continued to progress well. Chest tubes and temporary pacemaker wires were removed when deemed appropriate.     Patient was fluid overloaded and treated with diuretics. They remain up about 2 lbs from preoperative weight and will discharge with 5 days of diuretic therapy and potassium supplementation; will re-evaluate need in clinic follow-up.      Patient was transiently hyperglycemic and treated with insulin infusion then transitioned to sliding scale insulin per protocol. Blood sugars remained stable. No further glycemic control agents needed at this time.     Prior to discharge, his pain was controlled well, he was working well with therapies, able to perform most ADLs, ambulate without difficulty, and had full return of bowel and bladder function.  On December 22, 2023, he was discharged to home in stable condition. Follow up with cardiology and cardiac surgery have been arranged. Pt encouraged to follow up with PCP and cardiac rehab upon discharge.    Since being discharged from hospital, patient is recovering at home.     Allergies:   Allergies   Allergen Reactions    Cephalosporins Anaphylaxis    Penicillin G Anaphylaxis    Amoxicillin Cramps     Kidney problem    Fish Oil Hives    Shrimp Hives    Penicillins     Amoxicillin Rash     Back pain, kidney pain.     Misc. Sulfonamide Containing Compounds        Medications:   Current Outpatient Medications:     acetaminophen  (TYLENOL) 500 MG tablet, Take 2 tablets (1,000 mg) by mouth every 6 hours as needed for mild pain or other (and adjunct with moderate or severe pain or per patient request), Disp: , Rfl:     amLODIPine (NORVASC) 2.5 MG tablet, Take 1 tablet (2.5 mg) by mouth daily, Disp: 90 tablet, Rfl: 1    amLODIPine (NORVASC) 2.5 MG tablet, Take 1 tablet (2.5 mg) by mouth daily, Disp: 90 tablet, Rfl: 0    amLODIPine (NORVASC) 5 MG tablet, Take 1 tablet (5 mg) by mouth daily, Disp: 90 tablet, Rfl: 1    aspirin (ASA) 81 MG chewable tablet, Take 2 tablets (162 mg) by mouth daily, Disp: 120 tablet, Rfl: 0    atorvastatin (LIPITOR) 40 MG tablet, Take 1 tablet (40 mg) by mouth every evening, Disp: 90 tablet, Rfl: 0    furosemide (LASIX) 20 MG tablet, Take 1 tablet (20 mg) by mouth daily for 5 days, Disp: 5 tablet, Rfl: 0    methocarbamol (ROBAXIN) 750 MG tablet, Take 1 tablet (750 mg) by mouth every 6 hours as needed for muscle spasms or other (pain), Disp: 60 tablet, Rfl: 0    metoprolol succinate ER (TOPROL XL) 25 MG 24 hr tablet, Take 1 tablet (25 mg) by mouth daily, Disp: 90 tablet, Rfl: 0    oxyCODONE (ROXICODONE) 5 MG tablet, Take 1 tablet (5 mg) by mouth every 6 hours as needed for moderate to severe pain, Disp: 10 tablet, Rfl: 0    polyethylene glycol (MIRALAX) 17 GM/Dose powder, Take 17 g by mouth daily as needed for constipation, Disp: 510 g, Rfl: 0    senna-docusate (SENOKOT-S/PERICOLACE) 8.6-50 MG tablet, Take 1 tablet by mouth 2 times daily as needed for constipation, Disp: 30 tablet, Rfl: 0    Physical Exam:   Gen: nad  CV: s1/s2, no m/r/g   Lungs: course  Sternum: cdi  Extremities: no LE edema    Assessment/Plan:       doing well in their post operative period.   SBP/HR at home: 120-130/60s, HR: 70s  Resp: breathing, using IS couple times/day, 2500  Incisions: healing, washing with antibacterial soap, slt redness surrounding top portion of sternal incision, slt warmer-per pt appears to be stable for past couple of  days but doesn't believe it to be bigger. Outlined with tissue marking pen. Advised to call if any drainage from incision or enlarging redness.   Extremities/weight: does not feel to be carrying around additional fluid  Rehab plans: walking daily and is attending Cardiac rehab    Follow up apts: 2/2/24 0990 Dr. Gloria Cardiologist    All of the patient's questions were answered. Our contact information was given to him if they should have any further questions/concerns. No further follow-up is needed with us.      Chandni Lockett PA-C  CV Surgery  [facility]  Pager: [phone]

## 2024-01-08 NOTE — PATIENT INSTRUCTIONS
Date of Surgery: 2023    Drivin24     10 lbs weight restriction ends: 24    Dr Gloria in Cardiology on 24 0943

## 2024-01-12 ENCOUNTER — TELEPHONE (OUTPATIENT)
Dept: OTHER | Facility: CLINIC | Age: 64
End: 2024-01-12
Payer: COMMERCIAL

## 2024-01-12 ENCOUNTER — ANCILLARY PROCEDURE (OUTPATIENT)
Dept: CT IMAGING | Facility: CLINIC | Age: 64
End: 2024-01-12
Attending: NURSE PRACTITIONER
Payer: COMMERCIAL

## 2024-01-12 ENCOUNTER — OFFICE VISIT (OUTPATIENT)
Dept: CARDIOLOGY | Facility: CLINIC | Age: 64
End: 2024-01-12
Payer: COMMERCIAL

## 2024-01-12 DIAGNOSIS — Z95.1 S/P CABG (CORONARY ARTERY BYPASS GRAFT): Primary | ICD-10-CM

## 2024-01-12 DIAGNOSIS — Z95.1 S/P CABG (CORONARY ARTERY BYPASS GRAFT): ICD-10-CM

## 2024-01-12 PROCEDURE — 71250 CT THORAX DX C-: CPT

## 2024-01-12 PROCEDURE — 99024 POSTOP FOLLOW-UP VISIT: CPT | Performed by: NURSE PRACTITIONER

## 2024-01-12 RX ORDER — CLINDAMYCIN HCL 300 MG
300 CAPSULE ORAL 4 TIMES DAILY
Qty: 20 CAPSULE | Refills: 0 | Status: SHIPPED | OUTPATIENT
Start: 2024-01-12 | End: 2024-01-17

## 2024-01-12 NOTE — PROGRESS NOTES
CVTS Clinic Progress Note    Visit completed with the assistance of Mandarin     HPI: Mr. Drew Bradford is a 63 year old gentleman who recently underwent coronary artery bypass grafting x3 on 12/19/23 with Dr. Napoles. His postoperative course was uncomplicated and he discharged to home on 12/22/23. He was recently seen in postoperative clinic on 1/8/24 and was found to be doing well with the exception of slight redness surrounding the superior aspect of sternal incision. This was outlined and pt was instructed to call if this worsened. He returns to clinic today with new complaints that this same area is now draining.     S: Has been having persistent erythema at top portion of sternal incision, this has remained stable. This morning, had small amount of clear drainage which is new. Denies fevers, chills, myalgias, sternal popping or clicking. On exam, no drainage appreciated, erythema within boundaries of previous outline. Is slightly warm to touch.    O:  Vital Signs  There were no vitals taken for this visit.     Physical Exam   Constitutional:   Well nourished, well developed, resting comfortably   Cardiovascular: Regular rate and rhythm without murmurs or gallops  Pulmonary/Chest: Clear to auscultation bilaterally, with no wheezes or retractions. No respiratory distress.  Musculoskeletal:  No edema or clubbing   Skin/Incisions: Skin is warm and dry. No rash noted. Incision as noted above  Neurological: Alert and oriented to person, place, and time. Nonfocal exam  Psychiatric:  Normal mood and affect.    Assessment & Plan  Drew Faustin is a 63 year old gentleman s/p CABG x3 who now presents with drainage from superior aspect of sternal incision.     Sternum stable upon exam. CT chest to evaluate for mediastinitis.   Clindamycin x 5-day course.  Instructions provided when to call with further concerns. Our number provided.      DIAMANTE Gipson, ACNPC-AG, CCRN  Nurse Practitioner  Cardiothoracic  Surgery  Pager: [phone]

## 2024-01-12 NOTE — PATIENT INSTRUCTIONS
Take your antibiotic as directed. Please call us if the drainage gets worse.    Betsey Marti, RN Care Coordinator  Nilam Huizar, RN Care Coordinator   Cardiovascular Surgery  [phone]

## 2024-01-12 NOTE — TELEPHONE ENCOUNTER
Returned patient's call through interpretor services. States the top of incision has been red and now today it draining liquid. Requested a picture e-mailed, unable. Will bring into see provider.

## 2024-01-16 ENCOUNTER — TELEPHONE (OUTPATIENT)
Dept: CARDIOLOGY | Facility: CLINIC | Age: 64
End: 2024-01-16
Payer: COMMERCIAL

## 2024-01-16 NOTE — TELEPHONE ENCOUNTER
Completed call with help from Mandarin .     Patient called with questions regarding small pleural effusion and pericardial effusion on 1/12 CT.   Vital signs today are:    /80  HR 62  IS consistently up to 2000-2750ml   Denies SOB.    Patient reports feeling well aside from some pain, tenderness, pins, and needles around the incision site.  Discussed nerve pain after surgery. Patient reports that the incision on his chest has stopped draining and that his course of abx are complete. Per circled areas around incisions from photos that pt sent today, the redness is subsiding as well.    Explained that based on his vital signs and the fact that he is feeling well there is nothing to do at the moment. Explained that if he becomes tachycardic, hypotensive, SOB he should head to the emergency room. Discussed normal parameters for vital signs.       Betsey Marti RN

## 2024-02-01 ENCOUNTER — OFFICE VISIT (OUTPATIENT)
Dept: FAMILY MEDICINE | Facility: CLINIC | Age: 64
End: 2024-02-01
Payer: COMMERCIAL

## 2024-02-01 VITALS
DIASTOLIC BLOOD PRESSURE: 81 MMHG | OXYGEN SATURATION: 99 % | BODY MASS INDEX: 28.14 KG/M2 | TEMPERATURE: 97.7 F | HEART RATE: 58 BPM | WEIGHT: 190 LBS | HEIGHT: 69 IN | SYSTOLIC BLOOD PRESSURE: 126 MMHG | RESPIRATION RATE: 16 BRPM

## 2024-02-01 DIAGNOSIS — I25.83 CORONARY ARTERY DISEASE DUE TO LIPID RICH PLAQUE: ICD-10-CM

## 2024-02-01 DIAGNOSIS — E78.5 HYPERLIPIDEMIA LDL GOAL <100: Primary | ICD-10-CM

## 2024-02-01 DIAGNOSIS — Z95.1 S/P CABG (CORONARY ARTERY BYPASS GRAFT): ICD-10-CM

## 2024-02-01 DIAGNOSIS — I25.10 CORONARY ARTERY DISEASE DUE TO LIPID RICH PLAQUE: ICD-10-CM

## 2024-02-01 DIAGNOSIS — I10 ESSENTIAL HYPERTENSION: ICD-10-CM

## 2024-02-01 LAB
ANION GAP SERPL CALCULATED.3IONS-SCNC: 9 MMOL/L (ref 7–15)
BUN SERPL-MCNC: 14.1 MG/DL (ref 8–23)
CALCIUM SERPL-MCNC: 9.3 MG/DL (ref 8.8–10.2)
CHLORIDE SERPL-SCNC: 105 MMOL/L (ref 98–107)
CHOLEST SERPL-MCNC: 119 MG/DL
CREAT SERPL-MCNC: 0.91 MG/DL (ref 0.67–1.17)
DEPRECATED HCO3 PLAS-SCNC: 27 MMOL/L (ref 22–29)
EGFRCR SERPLBLD CKD-EPI 2021: >90 ML/MIN/1.73M2
FASTING STATUS PATIENT QL REPORTED: YES
GLUCOSE SERPL-MCNC: 104 MG/DL (ref 70–99)
HDLC SERPL-MCNC: 43 MG/DL
LDLC SERPL CALC-MCNC: 59 MG/DL
NONHDLC SERPL-MCNC: 76 MG/DL
POTASSIUM SERPL-SCNC: 4.3 MMOL/L (ref 3.4–5.3)
SODIUM SERPL-SCNC: 141 MMOL/L (ref 135–145)
TRIGL SERPL-MCNC: 83 MG/DL

## 2024-02-01 PROCEDURE — 80061 LIPID PANEL: CPT | Performed by: INTERNAL MEDICINE

## 2024-02-01 PROCEDURE — 36415 COLL VENOUS BLD VENIPUNCTURE: CPT | Performed by: INTERNAL MEDICINE

## 2024-02-01 PROCEDURE — 80048 BASIC METABOLIC PNL TOTAL CA: CPT | Performed by: INTERNAL MEDICINE

## 2024-02-01 PROCEDURE — 99214 OFFICE O/P EST MOD 30 MIN: CPT | Mod: 24 | Performed by: INTERNAL MEDICINE

## 2024-02-01 RX ORDER — ATORVASTATIN CALCIUM 40 MG/1
40 TABLET, FILM COATED ORAL EVERY EVENING
Qty: 90 TABLET | Refills: 3 | Status: SHIPPED | OUTPATIENT
Start: 2024-02-01 | End: 2024-02-02

## 2024-02-01 RX ORDER — METOPROLOL SUCCINATE 25 MG/1
25 TABLET, EXTENDED RELEASE ORAL DAILY
Qty: 90 TABLET | Refills: 3 | Status: SHIPPED | OUTPATIENT
Start: 2024-02-01

## 2024-02-01 NOTE — PROGRESS NOTES
Subjective   Drew is a 63 year old, presenting for the following health issues:  HPI     Chief Complaint:     Follow up on multiple concerns including CAD, hypertension, hyperlipidemia    HPI:   Patient Drew Bradford is a very pleasant 63 year old male with history of hypertension, hyperlipidemia who presents to Internal Medicine clinic today for follow up on multiple concerns including CAD, hypertension, hyperlipidemia.         Current Medications:     Current Outpatient Medications   Medication Sig Dispense Refill    amLODIPine (NORVASC) 2.5 MG tablet Take 1 tablet (2.5 mg) by mouth daily 90 tablet 1    amLODIPine (NORVASC) 2.5 MG tablet Take 1 tablet (2.5 mg) by mouth daily 90 tablet 0    amLODIPine (NORVASC) 5 MG tablet Take 1 tablet (5 mg) by mouth daily 90 tablet 1    aspirin (ASA) 81 MG chewable tablet Take 2 tablets (162 mg) by mouth daily 120 tablet 0    atorvastatin (LIPITOR) 40 MG tablet Take 1 tablet (40 mg) by mouth every evening 90 tablet 3    atorvastatin (LIPITOR) 40 MG tablet Take 1 tablet (40 mg) by mouth daily 30 tablet 3    metoprolol succinate ER (TOPROL XL) 25 MG 24 hr tablet Take 1 tablet (25 mg) by mouth daily 90 tablet 3    polyethylene glycol (MIRALAX) 17 GM/Dose powder Take 17 g by mouth daily as needed for constipation 510 g 0    senna-docusate (SENOKOT-S/PERICOLACE) 8.6-50 MG tablet Take 1 tablet by mouth 2 times daily as needed for constipation 30 tablet 0    furosemide (LASIX) 20 MG tablet Take 1 tablet (20 mg) by mouth daily for 5 days 5 tablet 0         Allergies:      Allergies   Allergen Reactions    Cephalosporins Anaphylaxis    Penicillin G Anaphylaxis    Amoxicillin Cramps     Kidney problem    Fish Oil Hives    Shrimp Hives    Penicillins     Amoxicillin Rash     Back pain, kidney pain.     Misc. Sulfonamide Containing Compounds             Past Medical History:     Past Medical History:   Diagnosis Date    HTN (hypertension)     Hyperlipidemia LDL goal <100     Nocturia      Presbyopia     Seasonal allergies          Past Surgical History:     Past Surgical History:   Procedure Laterality Date    BYPASS GRAFT ARTERY CORONARY N/A 12/19/2023    Procedure: CORONARY ARTERY BYPASS GRAFT x 3 (LEFT INTERNAL MAMMARY ARTERY - LEFT ANTERIOR DESCENDING ARTERY; RADIAL ARTERY - OBTUSE MARGIN; SAPHENOUS VEIN - POSTERIOR DESCENDING ARTERY), ENDOSCOPIC SAPHENOUS VEIN HARVEST ON THE LEFT LOWER EXTREMITY, ENDOSCOPIC RADIAL ARTERY HARVEST ON THE LEFT UPPER EXTREMITY, AND ON CARDIOPULMONARY PUMP OXYGENATOR    (INTRAOPERATIVE TRANSESOPHAGEAL ECHOCARDIOGRAM     COLONOSCOPY N/A 7/16/2018    Procedure: COLONOSCOPY;  colonoscopy;  Surgeon: Geremias Strickland MD;  Location:  GI    CV CORONARY ANGIOGRAM N/A 12/16/2023    Procedure: Coronary Angiogram;  Surgeon: Maverick Redmond MD;  Location:  HEART CARDIAC CATH LAB    ESOPHAGOSCOPY, GASTROSCOPY, DUODENOSCOPY (EGD), COMBINED N/A 1/14/2020    Procedure: ESOPHAGOGASTRODUODENOSCOPY, WITH BIOPSY;  Surgeon: Tez Vences MD;  Location:  GI    ZZC REMOVAL OF KIDNEY STONE           Family Medical History:     Family History   Problem Relation Age of Onset    No Known Problems Mother     No Known Problems Father          Social History:     Social History     Socioeconomic History    Marital status:      Spouse name: Not on file    Number of children: Not on file    Years of education: Not on file    Highest education level: Not on file   Occupational History    Not on file   Tobacco Use    Smoking status: Former    Smokeless tobacco: Never    Tobacco comments:     Quit 20 years ago   Vaping Use    Vaping Use: Never used   Substance and Sexual Activity    Alcohol use: Not Currently     Alcohol/week: 2.0 standard drinks of alcohol     Types: 2 Cans of beer per week     Comment: not drinking anymore per pt    Drug use: Never    Sexual activity: Yes     Partners: Female   Other Topics Concern    Parent/sibling w/ CABG, MI or angioplasty before 65F 55M?  Not Asked   Social History Narrative    ** Merged History Encounter **          Social Determinants of Health     Financial Resource Strain: Low Risk  (12/28/2023)    Financial Resource Strain     Within the past 12 months, have you or your family members you live with been unable to get utilities (heat, electricity) when it was really needed?: No   Food Insecurity: Low Risk  (12/28/2023)    Food Insecurity     Within the past 12 months, did you worry that your food would run out before you got money to buy more?: No     Within the past 12 months, did the food you bought just not last and you didn t have money to get more?: No   Transportation Needs: Low Risk  (12/28/2023)    Transportation Needs     Within the past 12 months, has lack of transportation kept you from medical appointments, getting your medicines, non-medical meetings or appointments, work, or from getting things that you need?: No   Physical Activity: Not on file   Stress: Not on file   Social Connections: Not on file   Interpersonal Safety: Low Risk  (2/1/2024)    Interpersonal Safety     Do you feel physically and emotionally safe where you currently live?: Yes     Within the past 12 months, have you been hit, slapped, kicked or otherwise physically hurt by someone?: No     Within the past 12 months, have you been humiliated or emotionally abused in other ways by your partner or ex-partner?: No   Housing Stability: Low Risk  (12/28/2023)    Housing Stability     Do you have housing? : Yes     Are you worried about losing your housing?: No           Review of System:     Constitutional: Negative for fever or chills  Skin: Negative for rashes  Ears/Nose/Throat: Negative for nasal congestion, sore throat  Respiratory: No shortness of breath, dyspnea on exertion, cough, or hemoptysis  Cardiovascular: Negative for chest pain, positive for CAD s/p recent CABG surgery  Gastrointestinal: Negative for nausea, vomiting  Genitourinary: Negative for dysuria,  "hematuria  Musculoskeletal: Negative for myalgias  Neurologic: Negative for headaches  Psychiatric: Negative for depression, anxiety  Hematologic/Lymphatic/Immunologic: Negative  Endocrine: Negative  Behavioral: Negative for tobacco use       Physical Exam:   /81 (BP Location: Right arm, Patient Position: Sitting, Cuff Size: Adult Large)   Pulse 58   Temp 97.7  F (36.5  C) (Oral)   Resp 16   Ht 1.75 m (5' 8.9\")   Wt 86.2 kg (190 lb)   SpO2 99%   BMI 28.14 kg/m      GENERAL: alert and no distress  EYES: eyes grossly normal to inspection, and conjunctivae and sclerae normal  HENT: Normocephalic atraumatic. Nose and mouth without ulcers or lesions  NECK: supple  RESP: lungs clear to auscultation   CV: regular rate and rhythm, CABG surgery scar clean, dry, intact  LYMPH: no peripheral edema   ABDOMEN: nondistended  MS: no gross musculoskeletal defects noted  SKIN: no suspicious lesions or rashes  NEURO: Alert & Oriented x 3.   PSYCH: mentation appears normal, affect normal        Diagnostic Test Results:     Diagnostic Test Results:  Labs reviewed in Epic    ASSESSMENT/PLAN:       Drew was seen today for abdominal pain and wound check.    Diagnoses and all orders for this visit:    Hyperlipidemia LDL goal <100  -     Lipid panel reflex to direct LDL Fasting; Future  -     Lipid panel reflex to direct LDL Fasting    Coronary artery disease due to lipid rich plaque  S/P CABG (coronary artery bypass graft)  -   no chest pains  - patient is due for refill of metoprolol, Lipitor medications  -   metoprolol succinate ER (TOPROL XL) 25 MG 24 hr tablet; Take 1 tablet (25 mg) by mouth daily medication refilled today  -     atorvastatin (LIPITOR) 40 MG tablet; Take 1 tablet (40 mg) by mouth every evening medication refilled today  - disability parking decal application paperwork signed in clinic today  -     Hemoglobin A1c; Future    Essential hypertension  -     Basic metabolic panel  (Ca, Cl, CO2, Creat, Gluc, " K, Na, BUN); Future  -     Basic metabolic panel  (Ca, Cl, CO2, Creat, Gluc, K, Na, BUN)            Follow Up Plan:     Patient is instructed to return to Internal Medicine clinic for follow-up visit in 1 month.        Tara Mehta MD  Internal Medicine  [facility]

## 2024-02-02 ENCOUNTER — OFFICE VISIT (OUTPATIENT)
Dept: CARDIOLOGY | Facility: CLINIC | Age: 64
End: 2024-02-02
Payer: COMMERCIAL

## 2024-02-02 VITALS
WEIGHT: 187 LBS | SYSTOLIC BLOOD PRESSURE: 132 MMHG | HEART RATE: 63 BPM | BODY MASS INDEX: 27.7 KG/M2 | OXYGEN SATURATION: 99 % | HEIGHT: 69 IN | DIASTOLIC BLOOD PRESSURE: 85 MMHG

## 2024-02-02 DIAGNOSIS — Z95.1 S/P CABG (CORONARY ARTERY BYPASS GRAFT): ICD-10-CM

## 2024-02-02 PROCEDURE — 99213 OFFICE O/P EST LOW 20 MIN: CPT | Mod: 24 | Performed by: INTERNAL MEDICINE

## 2024-02-02 RX ORDER — ATORVASTATIN CALCIUM 40 MG/1
40 TABLET, FILM COATED ORAL EVERY EVENING
Qty: 90 TABLET | Refills: 3 | Status: SHIPPED | OUTPATIENT
Start: 2024-02-02 | End: 2024-04-02

## 2024-02-02 NOTE — LETTER
2/2/2024    Tara Mehta MD  [address]  [place] [address]    RE: Drew Bradford       Dear Colleague,     I had the pleasure of seeing Drew Bradford in the [facility].  HPI and Plan:   Mr Bradford is a very pleasant 63-year-old gentleman who is Mandarin speaking who was admitted in December 2023 with non-STEMI.  He was found to have three-vessel disease and underwent CABG with LIMA to LAD, radial artery to obtuse marginal artery, SVG to PDA.  He also has history of hypertension and was on amlodipine prior to admission.  He also has dyslipidemia.  Today is coming for follow-up.  Overall he has done well since the time of surgery.  He ran out of Lipitor about a week ago.  LDL is significantly improved in response to Lipitor, down to 59 from 134.  He is on 162 mg daily of aspirin amlodipine 7.5 mg daily, metoprolol 25 mg daily.  Echocardiogram showed normal LV systolic function without significant valvular abnormalities.  Postsurgery he had some drainage from the sternal incision site and had CT chest to rule out mediastinitis.  Small pleural pericardial effusion was seen.  Overall patient tells me that he is doing well.  An official Mandarin  was used on phone.  No exertional chest discomfort or shortness of breath.  Some tingling sensation and numbness around the sternal incision.  He is not doing cardiac rehab.      Assessment and plan  CAD s/p three-vessel CABG recently.  Normal LV function on echocardiogram postsurgery.  On aspirin, statin, beta-blocker.  Dyslipidemia with significant improvement in LDL in response to high intensity statin.  Renewed prescription as he ran out of it.  Hypertension controlled on amlodipine and metoprolol    Recommendations  I have given referral for cardiac rehab.  Continue aspirin, statin, beta-blocker, amlodipine  Follow-up in 1-2 months with an echocardiogram        Today's clinic visit entailed:  Review of the result(s) of each unique test - lipid  panel, echocardiogram      Orders Placed This Encounter   Procedures    Follow-Up with Cardiology RYANN    Cardiac Rehab Referral    Echocardiogram Complete       Orders Placed This Encounter   Medications    atorvastatin (LIPITOR) 40 MG tablet     Sig: Take 1 tablet (40 mg) by mouth every evening     Dispense:  90 tablet     Refill:  3       Medications Discontinued During This Encounter   Medication Reason    atorvastatin (LIPITOR) 40 MG tablet Reorder (No AVS)         Encounter Diagnosis   Name Primary?    S/P CABG (coronary artery bypass graft)        CURRENT MEDICATIONS:  Current Outpatient Medications   Medication Sig Dispense Refill    amLODIPine (NORVASC) 2.5 MG tablet Take 1 tablet (2.5 mg) by mouth daily 90 tablet 1    amLODIPine (NORVASC) 5 MG tablet Take 1 tablet (5 mg) by mouth daily 90 tablet 1    aspirin (ASA) 81 MG chewable tablet Take 2 tablets (162 mg) by mouth daily 120 tablet 0    atorvastatin (LIPITOR) 40 MG tablet Take 1 tablet (40 mg) by mouth every evening 90 tablet 3    atorvastatin (LIPITOR) 40 MG tablet Take 1 tablet (40 mg) by mouth daily (Patient taking differently: Take 40 mg by mouth daily Patient ran out of this medication over a week ago. Per patient.2-2-24) 30 tablet 3    metoprolol succinate ER (TOPROL XL) 25 MG 24 hr tablet Take 1 tablet (25 mg) by mouth daily 90 tablet 3    polyethylene glycol (MIRALAX) 17 GM/Dose powder Take 17 g by mouth daily as needed for constipation 510 g 0    senna-docusate (SENOKOT-S/PERICOLACE) 8.6-50 MG tablet Take 1 tablet by mouth 2 times daily as needed for constipation 30 tablet 0    amLODIPine (NORVASC) 2.5 MG tablet Take 1 tablet (2.5 mg) by mouth daily (Patient not taking: Reported on 2/2/2024) 90 tablet 0    furosemide (LASIX) 20 MG tablet Take 1 tablet (20 mg) by mouth daily for 5 days (Patient not taking: Reported on 2/2/2024) 5 tablet 0       ALLERGIES     Allergies   Allergen Reactions    Cephalosporins Anaphylaxis    Penicillin G Anaphylaxis     Amoxicillin Cramps     Kidney problem    Fish Oil Hives    Shrimp Hives    Penicillins     Amoxicillin Rash     Back pain, kidney pain.     Misc. Sulfonamide Containing Compounds        PAST MEDICAL HISTORY:  Past Medical History:   Diagnosis Date    HTN (hypertension)     Hyperlipidemia LDL goal <100     Nocturia     Presbyopia     Seasonal allergies        PAST SURGICAL HISTORY:  Past Surgical History:   Procedure Laterality Date    BYPASS GRAFT ARTERY CORONARY N/A 12/19/2023    Procedure: CORONARY ARTERY BYPASS GRAFT x 3 (LEFT INTERNAL MAMMARY ARTERY - LEFT ANTERIOR DESCENDING ARTERY; RADIAL ARTERY - OBTUSE MARGIN; SAPHENOUS VEIN - POSTERIOR DESCENDING ARTERY), ENDOSCOPIC SAPHENOUS VEIN HARVEST ON THE LEFT LOWER EXTREMITY, ENDOSCOPIC RADIAL ARTERY HARVEST ON THE LEFT UPPER EXTREMITY, AND ON CARDIOPULMONARY PUMP OXYGENATOR    (INTRAOPERATIVE TRANSESOPHAGEAL ECHOCARDIOGRAM     COLONOSCOPY N/A 7/16/2018    Procedure: COLONOSCOPY;  colonoscopy;  Surgeon: Geremias Strickland MD;  Location: [facility]    CV CORONARY ANGIOGRAM N/A 12/16/2023    Procedure: Coronary Angiogram;  Surgeon: Maverick Redmond MD;  Location:  HEART CARDIAC CATH LAB    ESOPHAGOSCOPY, GASTROSCOPY, DUODENOSCOPY (EGD), COMBINED N/A 1/14/2020    Procedure: ESOPHAGOGASTRODUODENOSCOPY, WITH BIOPSY;  Surgeon: Tez Vences MD;  Location: [facility]    ZZC REMOVAL OF KIDNEY STONE         FAMILY HISTORY:  Family History   Problem Relation Age of Onset    No Known Problems Mother     No Known Problems Father        SOCIAL HISTORY:  Social History     Socioeconomic History    Marital status:      Spouse name: None    Number of children: None    Years of education: None    Highest education level: None   Tobacco Use    Smoking status: Former    Smokeless tobacco: Never    Tobacco comments:     Quit 20 years ago   Vaping Use    Vaping Use: Never used   Substance and Sexual Activity    Alcohol use: Not Currently     Alcohol/week: 2.0 standard drinks of  alcohol     Types: 2 Cans of beer per week     Comment: not drinking anymore per pt    Drug use: Never    Sexual activity: Yes     Partners: Female   Social History Narrative    ** Merged History Encounter **          Social Determinants of Health     Financial Resource Strain: Low Risk  (12/28/2023)    Financial Resource Strain     Within the past 12 months, have you or your family members you live with been unable to get utilities (heat, electricity) when it was really needed?: No   Food Insecurity: Low Risk  (12/28/2023)    Food Insecurity     Within the past 12 months, did you worry that your food would run out before you got money to buy more?: No     Within the past 12 months, did the food you bought just not last and you didn t have money to get more?: No   Transportation Needs: Low Risk  (12/28/2023)    Transportation Needs     Within the past 12 months, has lack of transportation kept you from medical appointments, getting your medicines, non-medical meetings or appointments, work, or from getting things that you need?: No   Interpersonal Safety: Low Risk  (2/1/2024)    Interpersonal Safety     Do you feel physically and emotionally safe where you currently live?: Yes     Within the past 12 months, have you been hit, slapped, kicked or otherwise physically hurt by someone?: No     Within the past 12 months, have you been humiliated or emotionally abused in other ways by your partner or ex-partner?: No   Housing Stability: Low Risk  (12/28/2023)    Housing Stability     Do you have housing? : Yes     Are you worried about losing your housing?: No       Review of Systems:  Skin:          Eyes:         ENT:         Respiratory: No particular shortness of breath  Cardiovascular:    No exertional chest pain, some tingling sensation and numbness around the incision site.  Gastroenterology:        Genitourinary:         Musculoskeletal:         Neurologic:         Psychiatric:         Heme/Lymph/Imm:        "  Endocrine:  Negative thyroid disorder;diabetes      Physical Exam:  Vitals: /85   Pulse 63   Ht 1.75 m (5' 8.9\")   Wt 84.8 kg (187 lb)   SpO2 99%   BMI 27.70 kg/m      General patient appears comfortable  Neck normal JVP  Cardiovascular system well-healing midline sternotomy scar, no drainage or erythema or tenderness noted, S1-S2 normal no murmur, rub or gallop  Respiratory system clear to auscultation  Extremities no significant edema      CC  No referring provider defined for this encounter.                  Thank you for allowing me to participate in the care of your patient.      Sincerely,     Jayy Gloria MD     [facility]  "

## 2024-02-02 NOTE — PROGRESS NOTES
HPI and Plan:   Mr Bradford is a very pleasant 63-year-old gentleman who is Mandarin speaking who was admitted in December 2023 with non-STEMI.  He was found to have three-vessel disease and underwent CABG with LIMA to LAD, radial artery to obtuse marginal artery, SVG to PDA.  He also has history of hypertension and was on amlodipine prior to admission.  He also has dyslipidemia.  Today is coming for follow-up.  Overall he has done well since the time of surgery.  He ran out of Lipitor about a week ago.  LDL is significantly improved in response to Lipitor, down to 59 from 134.  He is on 162 mg daily of aspirin amlodipine 7.5 mg daily, metoprolol 25 mg daily.  Echocardiogram showed normal LV systolic function without significant valvular abnormalities.  Postsurgery he had some drainage from the sternal incision site and had CT chest to rule out mediastinitis.  Small pleural pericardial effusion was seen.  Overall patient tells me that he is doing well.  An official Mandarin  was used on phone.  No exertional chest discomfort or shortness of breath.  Some tingling sensation and numbness around the sternal incision.  He is not doing cardiac rehab.      Assessment and plan  CAD s/p three-vessel CABG recently.  Normal LV function on echocardiogram postsurgery.  On aspirin, statin, beta-blocker.  Dyslipidemia with significant improvement in LDL in response to high intensity statin.  Renewed prescription as he ran out of it.  Hypertension controlled on amlodipine and metoprolol    Recommendations  I have given referral for cardiac rehab.  Continue aspirin, statin, beta-blocker, amlodipine  Follow-up in 1-2 months with an echocardiogram        Today's clinic visit entailed:  Review of the result(s) of each unique test - lipid panel, echocardiogram      Orders Placed This Encounter   Procedures    Follow-Up with Cardiology RYANN    Cardiac Rehab Referral    Echocardiogram Complete       Orders Placed This Encounter    Medications    atorvastatin (LIPITOR) 40 MG tablet     Sig: Take 1 tablet (40 mg) by mouth every evening     Dispense:  90 tablet     Refill:  3       Medications Discontinued During This Encounter   Medication Reason    atorvastatin (LIPITOR) 40 MG tablet Reorder (No AVS)         Encounter Diagnosis   Name Primary?    S/P CABG (coronary artery bypass graft)        CURRENT MEDICATIONS:  Current Outpatient Medications   Medication Sig Dispense Refill    amLODIPine (NORVASC) 2.5 MG tablet Take 1 tablet (2.5 mg) by mouth daily 90 tablet 1    amLODIPine (NORVASC) 5 MG tablet Take 1 tablet (5 mg) by mouth daily 90 tablet 1    aspirin (ASA) 81 MG chewable tablet Take 2 tablets (162 mg) by mouth daily 120 tablet 0    atorvastatin (LIPITOR) 40 MG tablet Take 1 tablet (40 mg) by mouth every evening 90 tablet 3    atorvastatin (LIPITOR) 40 MG tablet Take 1 tablet (40 mg) by mouth daily (Patient taking differently: Take 40 mg by mouth daily Patient ran out of this medication over a week ago. Per patient.2-2-24) 30 tablet 3    metoprolol succinate ER (TOPROL XL) 25 MG 24 hr tablet Take 1 tablet (25 mg) by mouth daily 90 tablet 3    polyethylene glycol (MIRALAX) 17 GM/Dose powder Take 17 g by mouth daily as needed for constipation 510 g 0    senna-docusate (SENOKOT-S/PERICOLACE) 8.6-50 MG tablet Take 1 tablet by mouth 2 times daily as needed for constipation 30 tablet 0    amLODIPine (NORVASC) 2.5 MG tablet Take 1 tablet (2.5 mg) by mouth daily (Patient not taking: Reported on 2/2/2024) 90 tablet 0    furosemide (LASIX) 20 MG tablet Take 1 tablet (20 mg) by mouth daily for 5 days (Patient not taking: Reported on 2/2/2024) 5 tablet 0       ALLERGIES     Allergies   Allergen Reactions    Cephalosporins Anaphylaxis    Penicillin G Anaphylaxis    Amoxicillin Cramps     Kidney problem    Fish Oil Hives    Shrimp Hives    Penicillins     Amoxicillin Rash     Back pain, kidney pain.     Misc. Sulfonamide Containing Compounds         PAST MEDICAL HISTORY:  Past Medical History:   Diagnosis Date    HTN (hypertension)     Hyperlipidemia LDL goal <100     Nocturia     Presbyopia     Seasonal allergies        PAST SURGICAL HISTORY:  Past Surgical History:   Procedure Laterality Date    BYPASS GRAFT ARTERY CORONARY N/A 12/19/2023    Procedure: CORONARY ARTERY BYPASS GRAFT x 3 (LEFT INTERNAL MAMMARY ARTERY - LEFT ANTERIOR DESCENDING ARTERY; RADIAL ARTERY - OBTUSE MARGIN; SAPHENOUS VEIN - POSTERIOR DESCENDING ARTERY), ENDOSCOPIC SAPHENOUS VEIN HARVEST ON THE LEFT LOWER EXTREMITY, ENDOSCOPIC RADIAL ARTERY HARVEST ON THE LEFT UPPER EXTREMITY, AND ON CARDIOPULMONARY PUMP OXYGENATOR    (INTRAOPERATIVE TRANSESOPHAGEAL ECHOCARDIOGRAM     COLONOSCOPY N/A 7/16/2018    Procedure: COLONOSCOPY;  colonoscopy;  Surgeon: Geremias Strickland MD;  Location:  GI    CV CORONARY ANGIOGRAM N/A 12/16/2023    Procedure: Coronary Angiogram;  Surgeon: Maverick Redmond MD;  Location:  [facility]ARD[facility]ATH LAB    ESOPHAGOSCOPY, GASTROSCOPY, DUODENOSCOPY (EGD), COMBINED N/A 1/14/2020    Procedure: ESOPHAGOGASTRODUODENOSCOPY, WITH BIOPSY;  Surgeon: Tez Vences MD;  Location:  GI    ZZ REMOVAL OF KIDNEY STONE         FAMILY HISTORY:  Family History   Problem Relation Age of Onset    No Known Problems Mother     No Known Problems Father        SOCIAL HISTORY:  Social History     Socioeconomic History    Marital status:      Spouse name: None    Number of children: None    Years of education: None    Highest education level: None   Tobacco Use    Smoking status: Former    Smokeless tobacco: Never    Tobacco comments:     Quit 20 years ago   Vaping Use    Vaping Use: Never used   Substance and Sexual Activity    Alcohol use: Not Currently     Alcohol/week: 2.0 standard drinks of alcohol     Types: 2 Cans of beer per week     Comment: not drinking anymore per pt    Drug use: Never    Sexual activity: Yes     Partners: Female   Social History Narrative    **  "Merged History Encounter **          Social Determinants of Health     Financial Resource Strain: Low Risk  (12/28/2023)    Financial Resource Strain     Within the past 12 months, have you or your family members you live with been unable to get utilities (heat, electricity) when it was really needed?: No   Food Insecurity: Low Risk  (12/28/2023)    Food Insecurity     Within the past 12 months, did you worry that your food would run out before you got money to buy more?: No     Within the past 12 months, did the food you bought just not last and you didn t have money to get more?: No   Transportation Needs: Low Risk  (12/28/2023)    Transportation Needs     Within the past 12 months, has lack of transportation kept you from medical appointments, getting your medicines, non-medical meetings or appointments, work, or from getting things that you need?: No   Interpersonal Safety: Low Risk  (2/1/2024)    Interpersonal Safety     Do you feel physically and emotionally safe where you currently live?: Yes     Within the past 12 months, have you been hit, slapped, kicked or otherwise physically hurt by someone?: No     Within the past 12 months, have you been humiliated or emotionally abused in other ways by your partner or ex-partner?: No   Housing Stability: Low Risk  (12/28/2023)    Housing Stability     Do you have housing? : Yes     Are you worried about losing your housing?: No       Review of Systems:  Skin:          Eyes:         ENT:         Respiratory: No particular shortness of breath  Cardiovascular:    No exertional chest pain, some tingling sensation and numbness around the incision site.  Gastroenterology:        Genitourinary:         Musculoskeletal:         Neurologic:         Psychiatric:         Heme/Lymph/Imm:         Endocrine:  Negative thyroid disorder;diabetes      Physical Exam:  Vitals: /85   Pulse 63   Ht 1.75 m (5' 8.9\")   Wt 84.8 kg (187 lb)   SpO2 99%   BMI 27.70 kg/m      General " patient appears comfortable  Neck normal JVP  Cardiovascular system well-healing midline sternotomy scar, no drainage or erythema or tenderness noted, S1-S2 normal no murmur, rub or gallop  Respiratory system clear to auscultation  Extremities no significant edema      CC  No referring provider defined for this encounter.

## 2024-02-07 ENCOUNTER — OFFICE VISIT (OUTPATIENT)
Dept: FAMILY MEDICINE | Facility: CLINIC | Age: 64
End: 2024-02-07
Payer: COMMERCIAL

## 2024-02-07 VITALS
TEMPERATURE: 97.6 F | HEIGHT: 69 IN | SYSTOLIC BLOOD PRESSURE: 128 MMHG | DIASTOLIC BLOOD PRESSURE: 85 MMHG | HEART RATE: 69 BPM | OXYGEN SATURATION: 98 % | BODY MASS INDEX: 28.14 KG/M2 | WEIGHT: 190 LBS | RESPIRATION RATE: 18 BRPM

## 2024-02-07 DIAGNOSIS — G47.33 OSA (OBSTRUCTIVE SLEEP APNEA): ICD-10-CM

## 2024-02-07 DIAGNOSIS — J30.2 SEASONAL ALLERGIC RHINITIS, UNSPECIFIED TRIGGER: ICD-10-CM

## 2024-02-07 DIAGNOSIS — E78.5 HYPERLIPIDEMIA LDL GOAL <100: Primary | ICD-10-CM

## 2024-02-07 DIAGNOSIS — I25.10 CORONARY ARTERY DISEASE DUE TO LIPID RICH PLAQUE: ICD-10-CM

## 2024-02-07 DIAGNOSIS — I25.83 CORONARY ARTERY DISEASE DUE TO LIPID RICH PLAQUE: ICD-10-CM

## 2024-02-07 DIAGNOSIS — I21.4 NSTEMI (NON-ST ELEVATED MYOCARDIAL INFARCTION) (H): ICD-10-CM

## 2024-02-07 PROCEDURE — 99214 OFFICE O/P EST MOD 30 MIN: CPT | Mod: 24 | Performed by: INTERNAL MEDICINE

## 2024-02-07 ASSESSMENT — PAIN SCALES - GENERAL: PAINLEVEL: NO PAIN (0)

## 2024-02-07 NOTE — PROGRESS NOTES
Subjective   Drew is a 63 year old, presenting for the following health issues:  History of Present Illness       Reason for visit:  Test    He eats 2-3 servings of fruits and vegetables daily.He consumes 0 sweetened beverage(s) daily.He exercises with enough effort to increase his heart rate 20 to 29 minutes per day.  He exercises with enough effort to increase his heart rate 4 days per week.   He is taking medications regularly.       Chief Complaint:     Follow up on multiple concerns including CAD, hypertension, hyperlipidemia    HPI:   Patient Drew Bradford is a very pleasant 63 year old male with history of hypertension, hyperlipidemia who presents to Internal Medicine clinic today for follow up on multiple concerns including CAD, hypertension, hyperlipidemia.         Current Medications:     Current Outpatient Medications   Medication Sig Dispense Refill    amLODIPine (NORVASC) 2.5 MG tablet Take 1 tablet (2.5 mg) by mouth daily 90 tablet 1    amLODIPine (NORVASC) 5 MG tablet Take 1 tablet (5 mg) by mouth daily 90 tablet 1    aspirin (ASA) 81 MG chewable tablet Take 2 tablets (162 mg) by mouth daily 120 tablet 0    atorvastatin (LIPITOR) 40 MG tablet Take 1 tablet (40 mg) by mouth every evening 90 tablet 3    atorvastatin (LIPITOR) 40 MG tablet Take 1 tablet (40 mg) by mouth daily (Patient taking differently: Take 40 mg by mouth daily Patient ran out of this medication over a week ago. Per patient.2-2-24) 30 tablet 3    metoprolol succinate ER (TOPROL XL) 25 MG 24 hr tablet Take 1 tablet (25 mg) by mouth daily 90 tablet 3    polyethylene glycol (MIRALAX) 17 GM/Dose powder Take 17 g by mouth daily as needed for constipation 510 g 0    senna-docusate (SENOKOT-S/PERICOLACE) 8.6-50 MG tablet Take 1 tablet by mouth 2 times daily as needed for constipation 30 tablet 0         Allergies:      Allergies   Allergen Reactions    Cephalosporins Anaphylaxis    Penicillin G Anaphylaxis    Amoxicillin Cramps     Kidney  problem    Fish Oil Hives    Shrimp Hives    Penicillins     Amoxicillin Rash     Back pain, kidney pain.     Misc. Sulfonamide Containing Compounds             Past Medical History:     Past Medical History:   Diagnosis Date    HTN (hypertension)     Hyperlipidemia LDL goal <100     Nocturia     Presbyopia     Seasonal allergies          Past Surgical History:     Past Surgical History:   Procedure Laterality Date    BYPASS GRAFT ARTERY CORONARY N/A 12/19/2023    Procedure: CORONARY ARTERY BYPASS GRAFT x 3 (LEFT INTERNAL MAMMARY ARTERY - LEFT ANTERIOR DESCENDING ARTERY; RADIAL ARTERY - OBTUSE MARGIN; SAPHENOUS VEIN - POSTERIOR DESCENDING ARTERY), ENDOSCOPIC SAPHENOUS VEIN HARVEST ON THE LEFT LOWER EXTREMITY, ENDOSCOPIC RADIAL ARTERY HARVEST ON THE LEFT UPPER EXTREMITY, AND ON CARDIOPULMONARY PUMP OXYGENATOR    (INTRAOPERATIVE TRANSESOPHAGEAL ECHOCARDIOGRAM     COLONOSCOPY N/A 7/16/2018    Procedure: COLONOSCOPY;  colonoscopy;  Surgeon: Geremias Strickland MD;  Location:  GI    CV CORONARY ANGIOGRAM N/A 12/16/2023    Procedure: Coronary Angiogram;  Surgeon: Maverick Redmond MD;  Location:  HEART CARD[facility] CATH LAB    ESOPHAGOSCOPY, GASTROSCOPY, DUODENOSCOPY (EGD), COMBINED N/A 1/14/2020    Procedure: ESOPHAGOGASTRODUODENOSCOPY, WITH BIOPSY;  Surgeon: Tez Vences MD;  Location: [facility]    Z REMOVAL OF KIDNEY STONE           Family Medical History:     Family History   Problem Relation Age of Onset    No Known Problems Mother     No Known Problems Father          Social History:     Social History     Socioeconomic History    Marital status:      Spouse name: Not on file    Number of children: Not on file    Years of education: Not on file    Highest education level: Not on file   Occupational History    Not on file   Tobacco Use    Smoking status: Former    Smokeless tobacco: Never    Tobacco comments:     Quit 20 years ago   Vaping Use    Vaping Use: Never used   Substance and Sexual Activity     Alcohol use: Not Currently     Alcohol/week: 2.0 standard drinks of alcohol     Types: 2 Cans of beer per week     Comment: not drinking anymore per pt    Drug use: Never    Sexual activity: Yes     Partners: Female   Other Topics Concern    Parent/sibling w/ CABG, MI or angioplasty before 65F 55M? Not Asked   Social History Narrative    ** Merged History Encounter **          Social Determinants of Health     Financial Resource Strain: Low Risk  (12/28/2023)    Financial Resource Strain     Within the past 12 months, have you or your family members you live with been unable to get utilities (heat, electricity) when it was really needed?: No   Food Insecurity: Low Risk  (12/28/2023)    Food Insecurity     Within the past 12 months, did you worry that your food would run out before you got money to buy more?: No     Within the past 12 months, did the food you bought just not last and you didn t have money to get more?: No   Transportation Needs: Low Risk  (12/28/2023)    Transportation Needs     Within the past 12 months, has lack of transportation kept you from medical appointments, getting your medicines, non-medical meetings or appointments, work, or from getting things that you need?: No   Physical Activity: Not on file   Stress: Not on file   Social Connections: Not on file   Interpersonal Safety: Low Risk  (2/7/2024)    Interpersonal Safety     Do you feel physically and emotionally safe where you currently live?: Yes     Within the past 12 months, have you been hit, slapped, kicked or otherwise physically hurt by someone?: No     Within the past 12 months, have you been humiliated or emotionally abused in other ways by your partner or ex-partner?: No   Housing Stability: Low Risk  (12/28/2023)    Housing Stability     Do you have housing? : Yes     Are you worried about losing your housing?: No           Review of System:     Constitutional: Negative for fever or chills  Skin: Negative for  "rashes  Ears/Nose/Throat: Negative for nasal congestion, sore throat  Respiratory: No shortness of breath, dyspnea on exertion, cough, or hemoptysis  Cardiovascular: Negative for chest pain, positive for CAD s/p recent CABG surgery  Gastrointestinal: Negative for nausea, vomiting  Genitourinary: Negative for dysuria, hematuria  Musculoskeletal: Negative for myalgias  Neurologic: Negative for headaches  Psychiatric: Negative for depression, anxiety  Hematologic/Lymphatic/Immunologic: Negative  Endocrine: Negative  Behavioral: Negative for tobacco use       Physical Exam:   /85 (BP Location: Right arm, Patient Position: Sitting, Cuff Size: Adult Large)   Pulse 69   Temp 97.6  F (36.4  C) (Oral)   Resp 18   Ht 1.75 m (5' 8.9\")   Wt 86.2 kg (190 lb)   SpO2 98%   BMI 28.14 kg/m      GENERAL: alert and no distress  EYES: eyes grossly normal to inspection, and conjunctivae and sclerae normal  HENT: Normocephalic atraumatic. Nose and mouth without ulcers or lesions  NECK: supple  RESP: lungs clear to auscultation   CV: regular rate and rhythm, CABG surgery scar clean, dry, intact  LYMPH: no peripheral edema   ABDOMEN: nondistended  MS: no gross musculoskeletal defects noted  SKIN: no suspicious lesions or rashes  NEURO: Alert & Oriented x 3.   PSYCH: mentation appears normal, affect normal        Diagnostic Test Results:     Diagnostic Test Results:  Labs reviewed in Epic    ASSESSMENT/PLAN:       Drew was seen today for abdominal pain and wound check.    Diagnoses and all orders for this visit:    Hyperlipidemia LDL goal <100  Coronary artery disease due to lipid rich plaque  S/P CABG (coronary artery bypass graft)  -   no chest pains  -   continue  atorvastatin (LIPITOR) 40 MG tablet; Take 1 tablet (40 mg) by mouth every evening   - follow up with cardiology clinic going forward  - [facility] term disability benefits application paperwork signed in clinic today    Essential hypertension  -    " stable, continue current therapy  -  continue metoprolol succinate ER (TOPROL XL) 25 MG 24 hr tablet; Take 1 tablet (25 mg) by mouth daily     AWA  -  stable, continue current therapy    Seasonal Allergic rhinitis  -  stable, continue current therapy    Follow Up Plan:     Patient is instructed to return to Internal Medicine clinic for follow-up visit in 1 month.        Tara Mehta MD  Internal Medicine  [facility]    Physical Exam

## 2024-03-27 NOTE — PROGRESS NOTES
~Cardiology Clinic Visit~    Primary Cardiologist: Dr. Gloria  Reason for visit: testing review    History of Present Illness    Drew Bradfrod is a very pleasant 63 year old male with a past medical history notable for CAD s/p CABG x3 (12/2-23), HTN, DLD    He was admitted in December 2023 with non-STEMI.  He was found to have three-vessel disease and underwent CABG with LIMA to LAD, radial artery to obtuse marginal artery, SVG to PDA.      He was seen in cardiology follow-up and to establish care just this past February 2024.  Overall he has done well since the time of surgery.  LDL is significantly improved in response to Lipitor, down to 59 from 134.  He is on 162 mg daily of aspirin, amlodipine 7.5 mg daily, metoprolol 25 mg daily.      Echocardiogram showed normal LV systolic function without significant valvular abnormalities.  Postsurgery he had some drainage from the sternal incision site and had CT chest to rule out mediastinitis.  Small pleural effusion was seen, and he was also started on a 5-day course of clindamycin.  At the time of that visit, patient reported no exertional chest discomfort or shortness of breath.  He did have some tingling sensation and numbness around the sternal incision.  He was not doing cardiac rehab.     Diagnostics:  3/29/24 Echocardiogram: EF stable 60-65% with septal motion consistent with postoperative state.  RV is normal in structure function and size.  No valvular abnormalities noted.  Overall, no significant changes compared to 12/22/2023 imaging.    Interval 04/02/24    Today, he continues to do well following surgery.  His incision is healing without issues.  BP well controlled today.  He is eager to get back to doing some physical activity, and we discussed that he no longer has post-operative lifting restrictions.  He currently is lifting light weights and taking light walks about 40-60-minutes duration with no limiting  symptoms.  __________________________________________________________________         Assessment and Impression:     CAD s/p three-vessel CABG recently.    Normal LV function on echocardiogram postsurgery.    On aspirin, statin, beta-blocker.  Dyslipidemia  Significant improvement in LDL in response to high intensity statin.    Hypertension controlled on amlodipine and metoprolol         Recommendations and Plan:     Check Hgb A1c today per patient request.  Continue all cardiac medications without changes today.  Follow up with RYANN in 6-months for routine visit.  __________________________________________________________________    Thank you for the opportunity to participate in this pleasant patient's care.    We would be happy to see this patient sooner for any concerns in the meantime.    DIAMANTE Guerrero, CNP   Nurse Practitioner  [facility]    Today's clinic visit entailed:  Review of the result(s) of each unique test - cardiac testing, cardiac imaging, procedure and surgical records, labs  The following tests were independently interpreted by me as noted in my documentation: echocardiogram, labs  Ordering of each unique test  Prescription drug management    The level of medical decision making during this visit was of moderate complexity.    Orders this Visit:  Orders Placed This Encounter   Procedures    Hemoglobin A1c    Follow-Up with Cardiology- [facility]     Orders Placed This Encounter   Medications    calcium carbonate (OS-STEPHEN) 500 MG tablet     Sig: Take 1 tablet by mouth 2 times daily    acetaminophen (TYLENOL) 80 MG chewable tablet     Sig: Take 80 mg by mouth every 4 hours as needed for mild pain or fever    atorvastatin (LIPITOR) 40 MG tablet     Sig: Take 1 tablet (40 mg) by mouth daily     Dispense:  90 tablet     Refill:  3    amLODIPine (NORVASC) 2.5 MG tablet     Sig: Take 1 tablet (2.5 mg) by mouth daily     Dispense:  90 tablet     Refill:  3    amLODIPine (NORVASC) 5  MG tablet     Sig: Take 1 tablet (5 mg) by mouth daily     Dispense:  90 tablet     Refill:  3    aspirin (ASA) 81 MG chewable tablet     Sig: Take 2 tablets (162 mg) by mouth daily     Dispense:  90 tablet     Refill:  4     Medications Discontinued During This Encounter   Medication Reason    atorvastatin (LIPITOR) 40 MG tablet     aspirin (ASA) 81 MG chewable tablet     amLODIPine (NORVASC) 5 MG tablet     amLODIPine (NORVASC) 2.5 MG tablet     atorvastatin (LIPITOR) 40 MG tablet      Encounter Diagnoses   Name Primary?    S/P CABG (coronary artery bypass graft) Yes    Coronary artery disease involving native coronary artery of native heart without angina pectoris     Hyperlipidemia LDL goal <70     Elevated glucose     Essential hypertension      CURRENT MEDICATIONS:  Current Outpatient Medications   Medication Sig Dispense Refill    acetaminophen (TYLENOL) 80 MG chewable tablet Take 80 mg by mouth every 4 hours as needed for mild pain or fever      amLODIPine (NORVASC) 2.5 MG tablet Take 1 tablet (2.5 mg) by mouth daily 90 tablet 3    amLODIPine (NORVASC) 5 MG tablet Take 1 tablet (5 mg) by mouth daily 90 tablet 3    aspirin (ASA) 81 MG chewable tablet Take 2 tablets (162 mg) by mouth daily 90 tablet 4    atorvastatin (LIPITOR) 40 MG tablet Take 1 tablet (40 mg) by mouth daily 90 tablet 3    calcium carbonate (OS-STEPHEN) 500 MG tablet Take 1 tablet by mouth 2 times daily      metoprolol succinate ER (TOPROL XL) 25 MG 24 hr tablet Take 1 tablet (25 mg) by mouth daily 90 tablet 3    polyethylene glycol (MIRALAX) 17 GM/Dose powder Take 17 g by mouth daily as needed for constipation 510 g 0    senna-docusate (SENOKOT-S/PERICOLACE) 8.6-50 MG tablet Take 1 tablet by mouth 2 times daily as needed for constipation 30 tablet 0     ALLERGIES     Allergies   Allergen Reactions    Cephalosporins Anaphylaxis    Penicillin G Anaphylaxis    Amoxicillin Cramps     Kidney problem    Fish Oil Hives    Shrimp Hives    Penicillins   "   Amoxicillin Rash     Back pain, kidney pain.     Misc. Sulfonamide Containing Compounds      PAST MEDICAL, SURGICAL, FAMILY, SOCIAL HISTORY:  History was reviewed and updated as needed, see medical record.    Review of Systems:  A 10-point Review Of Systems is otherwise normal except for that which is noted in the HPI and interval summary.    Physical Exam:    Vitals: /83   Pulse 63   Ht 1.727 m (5' 8\")   Wt 84.4 kg (186 lb)   SpO2 98%   BMI 28.28 kg/m    Constitutional: Appears stated age, well nourished, NAD.  Neck: Supple. Carotid pulses full and equal.   Respiratory: Non-labored. Lungs CTAB.  Cardiovascular: RRR, normal S1 and S2. No M/G/R.  No edema.  GI: Soft, non-distended, non-tender.  Skin: Warm and dry.  Surgical incision well approximated, no s/s infection.   Musculoskeletal/Extremities: Symmetrical movement.  Neurologic: No gross focal deficits. Alert, awake.  Psychiatric: Affect appropriate. Mentation normal.    Recent Lab Results:  LIPID RESULTS:  Lab Results   Component Value Date    CHOL 119 02/01/2024    CHOL 204 (H) 06/23/2021    HDL 43 02/01/2024    HDL 47 06/23/2021    LDL 59 02/01/2024     (H) 06/23/2021    TRIG 83 02/01/2024    TRIG 113 06/23/2021     LIVER ENZYME RESULTS:  Lab Results   Component Value Date    AST 26 12/22/2023    AST 20 06/23/2021    ALT 39 12/22/2023    ALT 36 06/23/2021     CBC RESULTS:  Lab Results   Component Value Date    WBC 10.2 12/28/2023    WBC 6.3 06/23/2021    RBC 3.91 (L) 12/28/2023    RBC 5.03 06/23/2021    HGB 11.7 (L) 12/28/2023    HGB 15.7 06/23/2021    HCT 36.1 (L) 12/28/2023    HCT 45.1 06/23/2021    MCV 92 12/28/2023    MCV 90 06/23/2021    MCH 29.9 12/28/2023    MCH 31.2 06/23/2021    MCHC 32.4 12/28/2023    MCHC 34.8 06/23/2021    RDW 11.8 12/28/2023    RDW 12.7 06/23/2021     12/28/2023     06/23/2021     BMP RESULTS:  Lab Results   Component Value Date     02/01/2024     06/23/2021    POTASSIUM 4.3 " 02/01/2024    POTASSIUM 4.6 12/19/2023    POTASSIUM 4.5 07/13/2022    POTASSIUM 3.8 06/23/2021    CHLORIDE 105 02/01/2024    CHLORIDE 107 07/13/2022    CHLORIDE 108 06/23/2021    CO2 27 02/01/2024    CO2 29 07/13/2022    CO2 31 06/23/2021    ANIONGAP 9 02/01/2024    ANIONGAP 2 (L) 07/13/2022    ANIONGAP 1 (L) 06/23/2021     (H) 02/01/2024     (H) 12/22/2023    GLC 99 07/13/2022    GLC 94 06/23/2021    BUN 14.1 02/01/2024    BUN 15 07/13/2022    BUN 13 06/23/2021    CR 0.91 02/01/2024    CR 0.98 06/23/2021    GFRESTIMATED >90 02/01/2024    GFRESTIMATED 83 06/23/2021    GFRESTBLACK >90 06/23/2021    STEPHEN 9.3 02/01/2024    STEPHEN 8.5 06/23/2021      A1C RESULTS:  Lab Results   Component Value Date    A1C 5.6 12/18/2023    A1C 5.6 06/23/2021     INR RESULTS:  Lab Results   Component Value Date    INR 1.21 (H) 12/19/2023    INR 1.32 (H) 12/19/2023

## 2024-03-29 ENCOUNTER — HOSPITAL ENCOUNTER (OUTPATIENT)
Dept: CARDIOLOGY | Facility: CLINIC | Age: 64
Discharge: HOME | End: 2024-03-29
Attending: INTERNAL MEDICINE | Admitting: INTERNAL MEDICINE
Payer: COMMERCIAL

## 2024-03-29 DIAGNOSIS — Z95.1 S/P CABG (CORONARY ARTERY BYPASS GRAFT): ICD-10-CM

## 2024-03-29 LAB — LVEF ECHO: NORMAL

## 2024-03-29 PROCEDURE — 255N000002 HC RX 255 OP 636: Performed by: INTERNAL MEDICINE

## 2024-03-29 PROCEDURE — 999N000208 ECHOCARDIOGRAM COMPLETE

## 2024-03-29 PROCEDURE — 93306 TTE W/DOPPLER COMPLETE: CPT | Mod: 26 | Performed by: INTERNAL MEDICINE

## 2024-03-29 RX ADMIN — HUMAN ALBUMIN MICROSPHERES AND PERFLUTREN 9 ML: 10; .22 INJECTION, SOLUTION INTRAVENOUS at 09:11

## 2024-04-02 ENCOUNTER — OFFICE VISIT (OUTPATIENT)
Dept: CARDIOLOGY | Facility: CLINIC | Age: 64
End: 2024-04-02
Attending: INTERNAL MEDICINE
Payer: COMMERCIAL

## 2024-04-02 ENCOUNTER — LAB (OUTPATIENT)
Dept: LAB | Facility: CLINIC | Age: 64
End: 2024-04-02
Payer: COMMERCIAL

## 2024-04-02 VITALS
DIASTOLIC BLOOD PRESSURE: 83 MMHG | BODY MASS INDEX: 28.19 KG/M2 | HEART RATE: 63 BPM | WEIGHT: 186 LBS | HEIGHT: 68 IN | OXYGEN SATURATION: 98 % | SYSTOLIC BLOOD PRESSURE: 122 MMHG

## 2024-04-02 DIAGNOSIS — E78.5 HYPERLIPIDEMIA LDL GOAL <70: ICD-10-CM

## 2024-04-02 DIAGNOSIS — I10 ESSENTIAL HYPERTENSION: ICD-10-CM

## 2024-04-02 DIAGNOSIS — R73.09 ELEVATED GLUCOSE: ICD-10-CM

## 2024-04-02 DIAGNOSIS — Z95.1 S/P CABG (CORONARY ARTERY BYPASS GRAFT): Primary | ICD-10-CM

## 2024-04-02 DIAGNOSIS — I25.10 CORONARY ARTERY DISEASE INVOLVING NATIVE CORONARY ARTERY OF NATIVE HEART WITHOUT ANGINA PECTORIS: ICD-10-CM

## 2024-04-02 LAB — HBA1C MFR BLD: 5.6 %

## 2024-04-02 PROCEDURE — 36415 COLL VENOUS BLD VENIPUNCTURE: CPT | Performed by: NURSE PRACTITIONER

## 2024-04-02 PROCEDURE — 99214 OFFICE O/P EST MOD 30 MIN: CPT | Performed by: NURSE PRACTITIONER

## 2024-04-02 PROCEDURE — 83036 HEMOGLOBIN GLYCOSYLATED A1C: CPT | Performed by: NURSE PRACTITIONER

## 2024-04-02 RX ORDER — ACETAMINOPHEN 80 MG/1
80 TABLET, CHEWABLE ORAL EVERY 4 HOURS PRN
COMMUNITY

## 2024-04-02 RX ORDER — ASPIRIN 81 MG/1
162 TABLET, CHEWABLE ORAL DAILY
Qty: 90 TABLET | Refills: 4 | COMMUNITY
Start: 2024-04-02

## 2024-04-02 RX ORDER — AMLODIPINE BESYLATE 5 MG/1
5 TABLET ORAL DAILY
Qty: 90 TABLET | Refills: 3 | Status: SHIPPED | OUTPATIENT
Start: 2024-04-02

## 2024-04-02 RX ORDER — AMLODIPINE BESYLATE 2.5 MG/1
2.5 TABLET ORAL DAILY
Qty: 90 TABLET | Refills: 3 | Status: SHIPPED | OUTPATIENT
Start: 2024-04-02

## 2024-04-02 RX ORDER — ATORVASTATIN CALCIUM 40 MG/1
40 TABLET, FILM COATED ORAL DAILY
Qty: 90 TABLET | Refills: 3 | Status: SHIPPED | OUTPATIENT
Start: 2024-04-02

## 2024-04-02 RX ORDER — CALCIUM CARBONATE 500(1250)
1 TABLET ORAL 2 TIMES DAILY
COMMUNITY

## 2024-04-02 NOTE — PATIENT INSTRUCTIONS
Thank you for your visit with the [facility] today.    Today's Summary     Labs today per request.  Continue medications without changes.  Follow up in 6-months.  Please call 6-months in advance to schedule your appointment.    If you have questions or concerns, please do not hesitate to call my nursing support team at [phone].    Scheduling phone number: [phone]    It was a pleasure seeing you today.     DIAMANTE Guerrero, CNP  Nurse Practitioner  [facility]  April 2, 2024  ________________________________________________________

## 2024-04-02 NOTE — LETTER
4/2/2024    Tara Mehta MD  [address]  [place] [address]    RE: Drew Bradford       Dear Colleague,     I had the pleasure of seeing Drew Bradford in the [facility].              ~Cardiology Clinic Visit~    Primary Cardiologist: Dr. Gloria  Reason for visit: testing review    History of Present Illness    Drew Bradford is a very pleasant 63 year old male with a past medical history notable for CAD s/p CABG x3 (12/2-23), HTN, DLD    He was admitted in December 2023 with non-STEMI.  He was found to have three-vessel disease and underwent CABG with LIMA to LAD, radial artery to obtuse marginal artery, SVG to PDA.      He was seen in cardiology follow-up and to establish care just this past February 2024.  Overall he has done well since the time of surgery.  LDL is significantly improved in response to Lipitor, down to 59 from 134.  He is on 162 mg daily of aspirin, amlodipine 7.5 mg daily, metoprolol 25 mg daily.      Echocardiogram showed normal LV systolic function without significant valvular abnormalities.  Postsurgery he had some drainage from the sternal incision site and had CT chest to rule out mediastinitis.  Small pleural effusion was seen, and he was also started on a 5-day course of clindamycin.  At the time of that visit, patient reported no exertional chest discomfort or shortness of breath.  He did have some tingling sensation and numbness around the sternal incision.  He was not doing cardiac rehab.     Diagnostics:  3/29/24 Echocardiogram: EF stable 60-65% with septal motion consistent with postoperative state.  RV is normal in structure function and size.  No valvular abnormalities noted.  Overall, no significant changes compared to 12/22/2023 imaging.    Interval 04/02/24    Today, he continues to do well following surgery.  His incision is healing without issues.  BP well controlled today.  He is eager to get back to doing some physical activity, and we discussed that he no  longer has post-operative lifting restrictions.  He currently is lifting light weights and taking light walks about 40-60-minutes duration with no limiting symptoms.  __________________________________________________________________         Assessment and Impression:     CAD s/p three-vessel CABG recently.    Normal LV function on echocardiogram postsurgery.    On aspirin, statin, beta-blocker.  Dyslipidemia  Significant improvement in LDL in response to high intensity statin.    Hypertension controlled on amlodipine and metoprolol         Recommendations and Plan:     Check Hgb A1c today per patient request.  Continue all cardiac medications without changes today.  Follow up with RYANN in 6-months for routine visit.  __________________________________________________________________    Thank you for the opportunity to participate in this pleasant patient's care.    We would be happy to see this patient sooner for any concerns in the meantime.    DIAMANTE Guerrero, CNP   Nurse Practitioner  [facility]    Today's clinic visit entailed:  Review of the result(s) of each unique test - cardiac testing, cardiac imaging, procedure and surgical records, labs  The following tests were independently interpreted by me as noted in my documentation: echocardiogram, labs  Ordering of each unique test  Prescription drug management    The level of medical decision making during this visit was of moderate complexity.    Orders this Visit:  Orders Placed This Encounter   Procedures    Hemoglobin A1c    Follow-Up with Cardiology- RYANN     Orders Placed This Encounter   Medications    calcium carbonate (OS-STEPHEN) 500 MG tablet     Sig: Take 1 tablet by mouth 2 times daily    acetaminophen (TYLENOL) 80 MG chewable tablet     Sig: Take 80 mg by mouth every 4 hours as needed for mild pain or fever    atorvastatin (LIPITOR) 40 MG tablet     Sig: Take 1 tablet (40 mg) by mouth daily     Dispense:  90 tablet      Refill:  3    amLODIPine (NORVASC) 2.5 MG tablet     Sig: Take 1 tablet (2.5 mg) by mouth daily     Dispense:  90 tablet     Refill:  3    amLODIPine (NORVASC) 5 MG tablet     Sig: Take 1 tablet (5 mg) by mouth daily     Dispense:  90 tablet     Refill:  3    aspirin (ASA) 81 MG chewable tablet     Sig: Take 2 tablets (162 mg) by mouth daily     Dispense:  90 tablet     Refill:  4     Medications Discontinued During This Encounter   Medication Reason    atorvastatin (LIPITOR) 40 MG tablet     aspirin (ASA) 81 MG chewable tablet     amLODIPine (NORVASC) 5 MG tablet     amLODIPine (NORVASC) 2.5 MG tablet     atorvastatin (LIPITOR) 40 MG tablet      Encounter Diagnoses   Name Primary?    S/P CABG (coronary artery bypass graft) Yes    Coronary artery disease involving native coronary artery of native heart without angina pectoris     Hyperlipidemia LDL goal <70     Elevated glucose     Essential hypertension      CURRENT MEDICATIONS:  Current Outpatient Medications   Medication Sig Dispense Refill    acetaminophen (TYLENOL) 80 MG chewable tablet Take 80 mg by mouth every 4 hours as needed for mild pain or fever      amLODIPine (NORVASC) 2.5 MG tablet Take 1 tablet (2.5 mg) by mouth daily 90 tablet 3    amLODIPine (NORVASC) 5 MG tablet Take 1 tablet (5 mg) by mouth daily 90 tablet 3    aspirin (ASA) 81 MG chewable tablet Take 2 tablets (162 mg) by mouth daily 90 tablet 4    atorvastatin (LIPITOR) 40 MG tablet Take 1 tablet (40 mg) by mouth daily 90 tablet 3    calcium carbonate (OS-STEPHEN) 500 MG tablet Take 1 tablet by mouth 2 times daily      metoprolol succinate ER (TOPROL XL) 25 MG 24 hr tablet Take 1 tablet (25 mg) by mouth daily 90 tablet 3    polyethylene glycol (MIRALAX) 17 GM/Dose powder Take 17 g by mouth daily as needed for constipation 510 g 0    senna-docusate (SENOKOT-S/PERICOLACE) 8.6-50 MG tablet Take 1 tablet by mouth 2 times daily as needed for constipation 30 tablet 0     ALLERGIES     Allergies  "  Allergen Reactions    Cephalosporins Anaphylaxis    Penicillin G Anaphylaxis    Amoxicillin Cramps     Kidney problem    Fish Oil Hives    Shrimp Hives    Penicillins     Amoxicillin Rash     Back pain, kidney pain.     Misc. Sulfonamide Containing Compounds      PAST MEDICAL, SURGICAL, FAMILY, SOCIAL HISTORY:  History was reviewed and updated as needed, see medical record.    Review of Systems:  A 10-point Review Of Systems is otherwise normal except for that which is noted in the HPI and interval summary.    Physical Exam:    Vitals: /83   Pulse 63   Ht 1.727 m (5' 8\")   Wt 84.4 kg (186 lb)   SpO2 98%   BMI 28.28 kg/m    Constitutional: Appears stated age, well nourished, NAD.  Neck: Supple. Carotid pulses full and equal.   Respiratory: Non-labored. Lungs CTAB.  Cardiovascular: RRR, normal S1 and S2. No M/G/R.  No edema.  GI: Soft, non-distended, non-tender.  Skin: Warm and dry.  Surgical incision well approximated, no s/s infection.   Musculoskeletal/Extremities: Symmetrical movement.  Neurologic: No gross focal deficits. Alert, awake.  Psychiatric: Affect appropriate. Mentation normal.    Recent Lab Results:  LIPID RESULTS:  Lab Results   Component Value Date    CHOL 119 02/01/2024    CHOL 204 (H) 06/23/2021    HDL 43 02/01/2024    HDL 47 06/23/2021    LDL 59 02/01/2024     (H) 06/23/2021    TRIG 83 02/01/2024    TRIG 113 06/23/2021     LIVER ENZYME RESULTS:  Lab Results   Component Value Date    AST 26 12/22/2023    AST 20 06/23/2021    ALT 39 12/22/2023    ALT 36 06/23/2021     CBC RESULTS:  Lab Results   Component Value Date    WBC 10.2 12/28/2023    WBC 6.3 06/23/2021    RBC 3.91 (L) 12/28/2023    RBC 5.03 06/23/2021    HGB 11.7 (L) 12/28/2023    HGB 15.7 06/23/2021    HCT 36.1 (L) 12/28/2023    HCT 45.1 06/23/2021    MCV 92 12/28/2023    MCV 90 06/23/2021    MCH 29.9 12/28/2023    MCH 31.2 06/23/2021    MCHC 32.4 12/28/2023    MCHC 34.8 06/23/2021    RDW 11.8 12/28/2023    RDW 12.7 " 06/23/2021     12/28/2023     06/23/2021     BMP RESULTS:  Lab Results   Component Value Date     02/01/2024     06/23/2021    POTASSIUM 4.3 02/01/2024    POTASSIUM 4.6 12/19/2023    POTASSIUM 4.5 07/13/2022    POTASSIUM 3.8 06/23/2021    CHLORIDE 105 02/01/2024    CHLORIDE 107 07/13/2022    CHLORIDE 108 06/23/2021    CO2 27 02/01/2024    CO2 29 07/13/2022    CO2 31 06/23/2021    ANIONGAP 9 02/01/2024    ANIONGAP 2 (L) 07/13/2022    ANIONGAP 1 (L) 06/23/2021     (H) 02/01/2024     (H) 12/22/2023    GLC 99 07/13/2022    GLC 94 06/23/2021    BUN 14.1 02/01/2024    BUN 15 07/13/2022    BUN 13 06/23/2021    CR 0.91 02/01/2024    CR 0.98 06/23/2021    GFRESTIMATED >90 02/01/2024    GFRESTIMATED 83 06/23/2021    GFRESTBLACK >90 06/23/2021    STEPHEN 9.3 02/01/2024    STEPHEN 8.5 06/23/2021      A1C RESULTS:  Lab Results   Component Value Date    A1C 5.6 12/18/2023    A1C 5.6 06/23/2021     INR RESULTS:  Lab Results   Component Value Date    INR 1.21 (H) 12/19/2023    INR 1.32 (H) 12/19/2023       Thank you for allowing me to participate in the care of your patient.      Sincerely,     DIAMANTE Guerrero [facility]  cc:   Jayy Gloria MD  [address]  [place] [address]

## 2024-07-08 ENCOUNTER — OFFICE VISIT (OUTPATIENT)
Dept: SLEEP MEDICINE | Facility: CLINIC | Age: 64
End: 2024-07-08
Payer: COMMERCIAL

## 2024-07-08 VITALS
HEART RATE: 60 BPM | SYSTOLIC BLOOD PRESSURE: 131 MMHG | DIASTOLIC BLOOD PRESSURE: 85 MMHG | BODY MASS INDEX: 27.4 KG/M2 | HEIGHT: 69 IN | OXYGEN SATURATION: 97 % | WEIGHT: 185 LBS

## 2024-07-08 DIAGNOSIS — G47.33 OSA (OBSTRUCTIVE SLEEP APNEA): Primary | ICD-10-CM

## 2024-07-08 PROCEDURE — 99213 OFFICE O/P EST LOW 20 MIN: CPT | Performed by: PSYCHIATRY & NEUROLOGY

## 2024-07-08 NOTE — NURSING NOTE
"Chief Complaint   Patient presents with    CPAP Follow Up     CPAP follow up       Initial /85   Pulse 60   Ht 1.748 m (5' 8.82\")   Wt 83.9 kg (185 lb)   SpO2 97%   BMI 27.46 kg/m   Estimated body mass index is 27.46 kg/m  as calculated from the following:    Height as of this encounter: 1.748 m (5' 8.82\").    Weight as of this encounter: 83.9 kg (185 lb).    Medication Reconciliation: complete  ESS 5  Adolph Guerrero MA       "

## 2024-07-08 NOTE — PROGRESS NOTES
Patient returns to discuss CPAP usage.      Patient was diagnosed with sleep disordered breathing in particular with an obstructive component in 2019 with an AHI of 68.  The patient has been treated with autotitrating CPAP (pressures 9 to 12).  The patient indicates treatment is going well.  Uses the device nearly every night for the vast majority of nights.  Indicates that they feel more awake and alert when they use it.       Per the download patient is using the device > 4 hours 100% of nights.  AHI on download is now <5.  Leak is good.  He has old supplies new order is placed.      While his numbers look very good he would like slightly higher pressures.  I will bump them up to 10-15cm H2O.     Patient is highly motivated to continue to use therapy      Patient has a history of currently treated HTN.     He does have some difficulty with sleep maintenance insomnia.  I suspect he has a bit of a circadian advance. Discussed the importance of light in the evening.  He will give it a try.      Orders placed for new supplies and patient will follow up in one year or earlier PRN.  He has several questions about the machine.  Will ask that he visit with [facility] with .     All questions have been answered.  Its a pleasure being asked to participate in the patients care.  Patient has been advised on the importance of never driving if tired or sleepy.      In addition to face to face time, time was spent in care coordination today (chart review, orders, documentation).  Total time spent in the care of the patient today was 20 minutes.

## 2024-08-23 ENCOUNTER — OFFICE VISIT (OUTPATIENT)
Dept: FAMILY MEDICINE | Facility: CLINIC | Age: 64
End: 2024-08-23
Payer: COMMERCIAL

## 2024-08-23 VITALS
TEMPERATURE: 97.9 F | BODY MASS INDEX: 26.96 KG/M2 | DIASTOLIC BLOOD PRESSURE: 80 MMHG | OXYGEN SATURATION: 100 % | HEIGHT: 69 IN | HEART RATE: 50 BPM | WEIGHT: 182 LBS | SYSTOLIC BLOOD PRESSURE: 126 MMHG

## 2024-08-23 DIAGNOSIS — Z12.5 SCREENING FOR PROSTATE CANCER: ICD-10-CM

## 2024-08-23 DIAGNOSIS — R73.09 ELEVATED GLUCOSE: ICD-10-CM

## 2024-08-23 DIAGNOSIS — Z00.00 ENCOUNTER FOR SUBSEQUENT ANNUAL WELLNESS VISIT (AWV) IN MEDICARE PATIENT: Primary | ICD-10-CM

## 2024-08-23 DIAGNOSIS — E78.5 HYPERLIPIDEMIA LDL GOAL <100: ICD-10-CM

## 2024-08-23 LAB
CHOLEST SERPL-MCNC: 113 MG/DL
FASTING STATUS PATIENT QL REPORTED: YES
FASTING STATUS PATIENT QL REPORTED: YES
GLUCOSE SERPL-MCNC: 101 MG/DL (ref 70–99)
HBA1C MFR BLD: 5.2 % (ref 0–5.6)
HDLC SERPL-MCNC: 48 MG/DL
LDLC SERPL CALC-MCNC: 42 MG/DL
NONHDLC SERPL-MCNC: 65 MG/DL
PSA SERPL DL<=0.01 NG/ML-MCNC: 1.58 NG/ML (ref 0–4.5)
TRIGL SERPL-MCNC: 113 MG/DL

## 2024-08-23 PROCEDURE — G0103 PSA SCREENING: HCPCS | Performed by: INTERNAL MEDICINE

## 2024-08-23 PROCEDURE — 36415 COLL VENOUS BLD VENIPUNCTURE: CPT | Performed by: INTERNAL MEDICINE

## 2024-08-23 PROCEDURE — 83036 HEMOGLOBIN GLYCOSYLATED A1C: CPT | Performed by: INTERNAL MEDICINE

## 2024-08-23 PROCEDURE — 80061 LIPID PANEL: CPT | Performed by: INTERNAL MEDICINE

## 2024-08-23 PROCEDURE — 82947 ASSAY GLUCOSE BLOOD QUANT: CPT | Performed by: INTERNAL MEDICINE

## 2024-08-23 PROCEDURE — G0438 PPPS, INITIAL VISIT: HCPCS | Performed by: INTERNAL MEDICINE

## 2024-08-23 SDOH — HEALTH STABILITY: PHYSICAL HEALTH: ON AVERAGE, HOW MANY DAYS PER WEEK DO YOU ENGAGE IN MODERATE TO STRENUOUS EXERCISE (LIKE A BRISK WALK)?: 5 DAYS

## 2024-08-23 SDOH — HEALTH STABILITY: PHYSICAL HEALTH: ON AVERAGE, HOW MANY MINUTES DO YOU ENGAGE IN EXERCISE AT THIS LEVEL?: 30 MIN

## 2024-08-23 ASSESSMENT — PAIN SCALES - GENERAL: PAINLEVEL: NO PAIN (0)

## 2024-08-23 ASSESSMENT — SOCIAL DETERMINANTS OF HEALTH (SDOH): HOW OFTEN DO YOU GET TOGETHER WITH FRIENDS OR RELATIVES?: ONCE A WEEK

## 2024-08-23 NOTE — PROGRESS NOTES
"Preventive Care Visit  [facility]  Tara Mehta MD, Internal Medicine  Aug 23, 2024      Assessment & Plan     Hyperlipidemia LDL goal <100  - Lipid panel reflex to direct LDL Fasting  - Lipid panel reflex to direct LDL Fasting    Elevated glucose  - Hemoglobin A1c  - Glucose  - Hemoglobin A1c  - Glucose    Screening for prostate cancer  - PSA, screen  - PSA, screen    Encounter for subsequent annual wellness visit (AWV) in Medicare patient  - diet, exercise      Patient has been advised of split billing requirements and indicates understanding: Yes        BMI  Estimated body mass index is 26.96 kg/m  as calculated from the following:    Height as of this encounter: 1.75 m (5' 8.9\").    Weight as of this encounter: 82.6 kg (182 lb).   Weight management plan: Discussed healthy diet and exercise guidelines    Counseling  Appropriate preventive services were addressed with this patient via screening, questionnaire, or discussion as appropriate for fall prevention, nutrition, physical activity, Tobacco-use cessation, social engagement, weight loss and cognition.  Checklist reviewing preventive services available has been given to the patient.  Reviewed patient's diet, addressing concerns and/or questions.   The patient was instructed to see the dentist every 6 months.   He is at risk for psychosocial distress and has been provided with information to reduce risk.   The patient was provided with written information regarding signs of hearing loss.       FUTURE APPOINTMENTS:       - Follow-up visit in 1 year    Mandy Russell is a 64 year old, presenting for the following:  Wellness Visit and Hypertension        8/23/2024     9:56 AM   Additional Questions   Roomed by Kelli KELLY        Health Care Directive  Patient does not have a Health Care Directive or Living Will:     HPI      Hypertension Follow-up    Do you check your blood pressure regularly outside of the clinic? Yes   Are you following " a low salt diet? Yes  Are your blood pressures ever more than 140 on the top number (systolic) OR more   than 90 on the bottom number (diastolic), for example 140/90? No        8/23/2024   General Health   How would you rate your overall physical health? (!) FAIR   Feel stress (tense, anxious, or unable to sleep) Only a little      (!) STRESS CONCERN      8/23/2024   Nutrition   Diet: Regular (no restrictions)    Low salt       Multiple values from one day are sorted in reverse-chronological order         8/23/2024   Exercise   Days per week of moderate/strenous exercise 5 days   Average minutes spent exercising at this level 30 min            8/23/2024   Social Factors   Frequency of gathering with friends or relatives Once a week   Worry food won't last until get money to buy more No    No   Food not last or not have enough money for food? No    No   Do you have housing? (Housing is defined as stable permanent housing and does not include staying ouside in a car, in a tent, in an abandoned building, in an overnight shelter, or couch-surfing.) Yes    Yes   Are you worried about losing your housing? No    No   Lack of transportation? No    No   Unable to get utilities (heat,electricity)? No    No       Multiple values from one day are sorted in reverse-chronological order         8/23/2024   Fall Risk   Fallen 2 or more times in the past year? No   Trouble with walking or balance? No             8/23/2024   Activities of Daily Living- Home Safety   Needs help with the following daily activites None of the above   Safety concerns in the home None of the above            8/23/2024   Dental   Dentist two times every year? (!) NO            8/23/2024   Hearing Screening   Hearing concerns? (!) I NEED TO ASK PEOPLE TO SPEAK UP OR REPEAT THEMSELVES.            8/23/2024   Driving Risk Screening   Patient/family members have concerns about driving No            8/23/2024   General Alertness/Fatigue Screening   Have you  been more tired than usual lately? No            8/23/2024   Urinary Incontinence Screening   Bothered by leaking urine in past 6 months No            8/23/2024   TB Screening   Were you born outside of the [place]? Yes            Today's PHQ-2 Score:       8/23/2024    10:08 AM   PHQ-2 ( 1999 [employer])   Q1: Little interest or pleasure in doing things 0   Q2: Feeling down, depressed or hopeless 0   PHQ-2 Score 0   Q1: Little interest or pleasure in doing things Not at all   Q2: Feeling down, depressed or hopeless Not at all   PHQ-2 Score 0           8/23/2024   Substance Use   Alcohol more than 3/day or more than 7/wk No   Do you have a current opioid prescription? No   How severe/bad is pain from 1 to 10? 0/10 (No Pain)   Do you use any other substances recreationally? No        Social History     Tobacco Use    Smoking status: Former    Smokeless tobacco: Never    Tobacco comments:     Quit 20 years ago   Vaping Use    Vaping status: Never Used   Substance Use Topics    Alcohol use: Not Currently     Alcohol/week: 2.0 standard drinks of alcohol     Types: 2 Cans of beer per week     Comment: not drinking anymore per pt    Drug use: Never       Last PSA:   PSA   Date Value Ref Range Status   06/23/2021 1.49 0 - 4 ug/L Final     Comment:     Assay Method:  Chemiluminescence using Siemens Vista analyzer     Prostate Specific Antigen Screen   Date Value Ref Range Status   08/18/2023 1.24 0.00 - 4.50 ng/mL Final     ASCVD Risk   The ASCVD Risk score (Maya DK, et al., 2019) failed to calculate for the following reasons:    The patient has a prior MI or stroke diagnosis        Reviewed and updated as needed this visit by Provider                    Past Medical History:   Diagnosis Date    HTN (hypertension)     Hyperlipidemia LDL goal <100     Nocturia     Presbyopia     Seasonal allergies      Current providers sharing in care for this patient include:  Patient Care Team:  Tara Mehta MD as PCP - General  "(Internal Medicine)  Tara Mehta MD as Assigned PCP  Tara Mehta MD (Internal Medicine)  Pillo Arreola MD as Assigned Sleep Provider  Harmony Lacey APRN CNP as Assigned Heart and Vascular Provider    The following health maintenance items are reviewed in Epic and correct as of today:  Health Maintenance   Topic Date Due    Pneumococcal Vaccine: 65+ Years (1 of 2 - PCV) Never done    ZOSTER IMMUNIZATION (1 of 2) Never done    RSV VACCINE (Pregnancy & 60+) (1 - 1-dose 60+ series) Never done    COVID-19 Vaccine (9 - 2023-24 season) 05/09/2024    AORTIC ANEURYSM SCREENING (SYSTEM ASSIGNED)  Never done    ANNUAL REVIEW OF HM ORDERS  08/18/2024    MEDICARE ANNUAL WELLNESS VISIT  08/18/2024    INFLUENZA VACCINE (1) 09/01/2024    ADVANCE CARE PLANNING  12/27/2024    LUNG CANCER SCREENING  01/12/2025    LIPID  02/01/2025    FALL RISK ASSESSMENT  08/23/2025    GLUCOSE  02/01/2027    COLORECTAL CANCER SCREENING  07/16/2028    DTAP/TDAP/TD IMMUNIZATION (2 - Td or Tdap) 08/18/2033    HEPATITIS C SCREENING  Completed    HIV SCREENING  Completed    PHQ-2 (once per calendar year)  Completed    HPV IMMUNIZATION  Aged Out    MENINGITIS IMMUNIZATION  Aged Out    RSV MONOCLONAL ANTIBODY  Aged Out         Review of Systems  Constitutional, HEENT, cardiovascular, pulmonary, GI, , musculoskeletal, neuro, skin, endocrine and psych systems are negative, except as otherwise noted.     Objective    Exam  /80 (BP Location: Left arm, Patient Position: Sitting, Cuff Size: Adult Regular)   Pulse 50   Temp 97.9  F (36.6  C) (Oral)   Ht 1.75 m (5' 8.9\")   Wt 82.6 kg (182 lb)   SpO2 100%   BMI 26.96 kg/m     Estimated body mass index is 26.96 kg/m  as calculated from the following:    Height as of this encounter: 1.75 m (5' 8.9\").    Weight as of this encounter: 82.6 kg (182 lb).    Physical Exam  GENERAL: alert and no distress  EYES: Eyes grossly normal to inspection, PERRL and conjunctivae and sclerae " normal  HENT: ear canals and TM's normal, nose and mouth without ulcers or lesions  NECK: no adenopathy, no asymmetry, masses, or scars  RESP: lungs clear to auscultation - no rales, rhonchi or wheezes  CV: regular rate and rhythm, normal S1 S2, no S3 or S4, no murmur, click or rub, no peripheral edema  ABDOMEN: soft, nontender, no hepatosplenomegaly, no masses and bowel sounds normal  MS: no gross musculoskeletal defects noted, no edema  SKIN: no suspicious lesions or rashes  NEURO: Normal strength and tone, mentation intact and speech normal  PSYCH: mentation appears normal, affect normal/bright          8/23/2024   Mini Cog   Clock Draw Score 2 Normal   3 Item Recall 3 objects recalled   Mini Cog Total Score 5          Signed Electronically by: Tara Mehta MD

## 2024-11-14 ENCOUNTER — OFFICE VISIT (OUTPATIENT)
Dept: CARDIOLOGY | Facility: CLINIC | Age: 64
End: 2024-11-14
Attending: NURSE PRACTITIONER
Payer: COMMERCIAL

## 2024-11-14 VITALS
DIASTOLIC BLOOD PRESSURE: 64 MMHG | OXYGEN SATURATION: 99 % | SYSTOLIC BLOOD PRESSURE: 122 MMHG | HEIGHT: 69 IN | BODY MASS INDEX: 27.85 KG/M2 | WEIGHT: 188 LBS | HEART RATE: 62 BPM

## 2024-11-14 DIAGNOSIS — Z95.1 S/P CABG (CORONARY ARTERY BYPASS GRAFT): ICD-10-CM

## 2024-11-14 DIAGNOSIS — I25.10 CORONARY ARTERY DISEASE INVOLVING NATIVE CORONARY ARTERY OF NATIVE HEART WITHOUT ANGINA PECTORIS: ICD-10-CM

## 2024-11-14 RX ORDER — NITROGLYCERIN 0.4 MG/1
TABLET SUBLINGUAL
Qty: 30 TABLET | Refills: 1 | Status: SHIPPED | OUTPATIENT
Start: 2024-11-14

## 2024-11-14 NOTE — LETTER
11/14/2024    Tara Mehta MD  [address]  [place] [address]    RE: Drew Bradford       Dear Colleague,     I had the pleasure of seeing Drew Bradford in the [facility].          ~Cardiology Clinic Visit~    Primary Cardiologist: Dr. Gloria  Reason for visit: 6-month cardiology follow-up      Drew Bradford MRN# [id number]   YOB: 1960 Age: 64 year old       HPI:    Drew Bradford is a delightful 64 year old patient with past medical history significant for:    Coronary artery disease status post three-vessel CABG in 12/2023 (LIMA to LAD, radial artery to OM, SVG to PDA) 12/19/2023  Hypertension  Dyslipidemia      I had the opportunity to see  for cardiology follow-up today.  He follows with Dr. Gloria and was last seen by him on 2/2/2024.  Since that time he was seen by my colleague Harmony Lacey CNP on 4/2/2024 for review of diagnostic testing.  At the time of his last visit he was doing well from a cardiovascular standpoint and making steady progress postop recovery.  Postsurgery he had drainage around incision site and had a chest CT to rule out mediastinitis.  CT showed small pleural effusions and he was started on 5-day course of clindamycin with resolution.    He had an echocardiogram from 3/29/2024 which showed LVEF 60-65% with septal motion consistent with postoperative state.  Overall, no significant changes when compared to echocardiogram from 5/2023.    Mr. Bradford turns for 6-month follow-up.  Our visit today was done with the aid of a Mandarin .  Since seen last, he reports feeling improved from a cardiac standpoint.  He does note continued intermittent dull chest pain that has been ongoing since surgery.  There is no pattern to this pain however it has slowly improved over time.  He also notes some dyspnea on exertion with activities like brisk walking, this is also unchanged.  No lower extremity swelling, orthopnea, or PND.  Occasional lightheadedness with  change in position.  No syncope.     Diagnotic studies:  Echocardiogram 3/29/2024:  Left ventricular systolic function is normal.  The visual ejection fraction is 60-65%.  The left ventricle is normal in size.  The right ventricle is normal in structure, function and size.  Doppler interrogation does not demonstrate signifcant stenosis or  insufficiency involving cardiac valves.     No significant change since 12/22/2023  US Carotid bilateral 12/17/2023:  1.  Mild plaque formation, velocities consistent with less than 50% stenosis in the right internal carotid artery.  2.  Mild plaque formation, velocities consistent with less than 50% stenosis in the left internal carotid artery.  3.  Flow within the vertebral arteries is antegrade.  Coronary angiogram 12/16/2023:  Severe multi-vessel coronary artery disease with 95% ostial stenosis of the LAD, 90% mid-vessel stenosis of a large bifurcating OM2, and subtotal 99% serial stenoses of the RPDA.   Uncomplicated right radial access.         Assessment:  CAD s/p three-vessel CABG  (LIMA to LAD, radial artery to OM, SVG to PDA) 12/19/2023.    Normal LV function on echocardiogram postsurgery.    On aspirin, statin, beta-blocker.  Dyslipidemia  Lipid panel 8/23/2024: Cholesterol 113, , HDL 40, LDL 42  Atorvastatin 40 mg    Hypertension  Well-controlled on amlodipine and metoprolol  BP today 122/64    Plan:   It was my pleasure to see Mr. Bradford for cardiology follow-up today.  Overall he is stable from a cardiovascular standpoint.  He does note continued dull intermittent chest discomfort which has been ongoing since surgery and gradually improving.  We discussed a trial of long-acting nitrates however he would prefer to hold off at this point.  Can consider down the road if pain is not improved.  Blood pressure and cholesterol are well-controlled today.  No changes.  I have sent a prescription for as needed nitroglycerin and reviewed indications and instructions for use  with   Follow-up in 6 months with Dr. Gloria for routine follow-up, sooner if any new concerns.    Thank you for the opportunity to participate in this pleasant patient's care.    We would be happy to see this patient sooner for any concerns in the meantime.    DIAMANTE Michelle, CNP   Nurse Practitioner  [facility]      Today's clinic visit entailed:  A total of 50 minutes was spent with patient, on chart review and documentation today.     The longitudinal plan of care for the diagnosis(es)/condition(s) as documented were addressed during this visit. Due to the added complexity in care, I will continue to support Drew in the subsequent management and with ongoing continuity of care.        Orders Placed This Encounter   Procedures     Follow-Up with Cardiology     Orders Placed This Encounter   Medications     nitroGLYcerin (NITROSTAT) 0.4 MG sublingual tablet     Sig: For chest pain place 1 tablet under the tongue every 5 minutes for 3 doses. If symptoms persist 5 minutes after 1st dose call 911.     Dispense:  30 tablet     Refill:  1     There are no discontinued medications.      Encounter Diagnoses   Name Primary?     S/P CABG (coronary artery bypass graft)      Coronary artery disease involving native coronary artery of native heart without angina pectoris        CURRENT MEDICATIONS:  Current Outpatient Medications   Medication Sig Dispense Refill     amLODIPine (NORVASC) 2.5 MG tablet Take 1 tablet (2.5 mg) by mouth daily (Patient taking differently: Take 2.5 mg by mouth daily. Together with 5mg daily for total of 7.5 mg.) 90 tablet 3     amLODIPine (NORVASC) 5 MG tablet Take 1 tablet (5 mg) by mouth daily 90 tablet 3     aspirin (ASA) 81 MG chewable tablet Take 2 tablets (162 mg) by mouth daily 90 tablet 4     atorvastatin (LIPITOR) 40 MG tablet Take 1 tablet (40 mg) by mouth daily 90 tablet 3     calcium carbonate (OS-STEPHEN) 500 MG tablet Take 1 tablet by mouth 2 times daily        metoprolol succinate ER (TOPROL XL) 25 MG 24 hr tablet Take 1 tablet (25 mg) by mouth daily 90 tablet 3     nitroGLYcerin (NITROSTAT) 0.4 MG sublingual tablet For chest pain place 1 tablet under the tongue every 5 minutes for 3 doses. If symptoms persist 5 minutes after 1st dose call 911. 30 tablet 1     acetaminophen (TYLENOL) 80 MG chewable tablet Take 80 mg by mouth every 4 hours as needed for mild pain or fever (Patient not taking: Reported on 11/14/2024)       polyethylene glycol (MIRALAX) 17 GM/Dose powder Take 17 g by mouth daily as needed for constipation (Patient not taking: Reported on 11/14/2024) 510 g 0     senna-docusate (SENOKOT-S/PERICOLACE) 8.6-50 MG tablet Take 1 tablet by mouth 2 times daily as needed for constipation (Patient not taking: Reported on 11/14/2024) 30 tablet 0       ALLERGIES     Allergies   Allergen Reactions     Cephalosporins Anaphylaxis     Penicillin G Anaphylaxis     Amoxicillin Cramps     Kidney problem     Fish Oil Hives     Shrimp Hives     Penicillins      Amoxicillin Rash     Back pain, kidney pain.      Misc. Sulfonamide Containing Compounds        PAST MEDICAL HISTORY:  Past Medical History:   Diagnosis Date     HTN (hypertension)      Hyperlipidemia LDL goal <100      Nocturia      Presbyopia      Seasonal allergies        PAST SURGICAL HISTORY:  Past Surgical History:   Procedure Laterality Date     BYPASS GRAFT ARTERY CORONARY N/A 12/19/2023    Procedure: CORONARY ARTERY BYPASS GRAFT x 3 (LEFT INTERNAL MAMMARY ARTERY - LEFT ANTERIOR DESCENDING ARTERY; RADIAL ARTERY - OBTUSE MARGIN; SAPHENOUS VEIN - POSTERIOR DESCENDING ARTERY), ENDOSCOPIC SAPHENOUS VEIN HARVEST ON THE LEFT LOWER EXTREMITY, ENDOSCOPIC RADIAL ARTERY HARVEST ON THE LEFT UPPER EXTREMITY, AND ON CARDIOPULMONARY PUMP OXYGENATOR    (INTRAOPERATIVE TRANSESOPHAGEAL ECHOCARDIOGRAM      COLONOSCOPY N/A 7/16/2018    Procedure: COLONOSCOPY;  colonoscopy;  Surgeon: Geremias Strickland MD;  Location: [facility]      CV CORONARY ANGIOGRAM N/A 12/16/2023    Procedure: Coronary Angiogram;  Surgeon: Maverick Redmond MD;  Location:  HEART CARDIAC CATH LAB     ESOPHAGOSCOPY, GASTROSCOPY, DUODENOSCOPY (EGD), COMBINED N/A 1/14/2020    Procedure: ESOPHAGOGASTRODUODENOSCOPY, WITH BIOPSY;  Surgeon: Tez Vences MD;  Location:  GI     ZZC REMOVAL OF KIDNEY STONE         FAMILY HISTORY:  Family History   Problem Relation Age of Onset     No Known Problems Mother      No Known Problems Father        SOCIAL HISTORY:  Social History     Socioeconomic History     Marital status:    Tobacco Use     Smoking status: Former     Smokeless tobacco: Never     Tobacco comments:     Quit 20 years ago   Vaping Use     Vaping status: Never Used   Substance and Sexual Activity     Alcohol use: Not Currently     Alcohol/week: 2.0 standard drinks of alcohol     Types: 2 Cans of beer per week     Comment: not drinking anymore per pt     Drug use: Never     Sexual activity: Yes     Partners: Female   Social History Narrative    ** Merged History Encounter **          Social Drivers of Health     Financial Resource Strain: Low Risk  (8/23/2024)    Financial Resource Strain      Within the past 12 months, have you or your family members you live with been unable to get utilities (heat, electricity) when it was really needed?: No   Food Insecurity: Low Risk  (8/23/2024)    Food Insecurity      Within the past 12 months, did you worry that your food would run out before you got money to buy more?: No      Within the past 12 months, did the food you bought just not last and you didn t have money to get more?: No   Transportation Needs: Low Risk  (8/23/2024)    Transportation Needs      Within the past 12 months, has lack of transportation kept you from medical appointments, getting your medicines, non-medical meetings or appointments, work, or from getting things that you need?: No   Physical Activity: Sufficiently Active (8/23/2024)    Exercise  "Vital Sign      Days of Exercise per Week: 5 days      Minutes of Exercise per Session: 30 min   Stress: No Stress Concern Present (8/23/2024)    [employer] [place] [employer] - Occupational Stress Questionnaire      Feeling of Stress : Only a little   Social Connections: Unknown (8/23/2024)    Social Connection and Isolation Panel [NHANES]      Frequency of Social Gatherings with Friends and Family: Once a week   Interpersonal Safety: Low Risk  (8/23/2024)    Interpersonal Safety      Do you feel physically and emotionally safe where you currently live?: Yes      Within the past 12 months, have you been hit, slapped, kicked or otherwise physically hurt by someone?: No      Within the past 12 months, have you been humiliated or emotionally abused in other ways by your partner or ex-partner?: No   Housing Stability: Low Risk  (8/23/2024)    Housing Stability      Do you have housing? : Yes      Are you worried about losing your housing?: No       Review of Systems:  Skin:  Positive for     Eyes:       ENT:       Respiratory:  Positive for sleep apnea;CPAP  Cardiovascular:  Negative;syncope or near-syncope;cyanosis;edema;fatigue;exercise intolerance Positive for;chest pain  Gastroenterology:      Genitourinary:       Musculoskeletal:       Neurologic:       Psychiatric:       Heme/Lymph/Imm:       Endocrine:         Physical Exam:    Vitals: /64   Pulse 62   Ht 1.753 m (5' 9\")   Wt 85.3 kg (188 lb)   SpO2 99%   BMI 27.76 kg/m    Constitutional: Well nourished and in no apparent distress.  Eyes: Pupils equal, round. Sclerae anicteric.   HEENT: Normocephalic, atraumatic.   Neck: Supple.   Respiratory: Breathing non-labored. Lungs clear to auscultation bilaterally. No crackles, wheezes, rhonchi, or rales.  Cardiovascular:  Regular rate and rhythm, normal S1 and S2. No murmur, rub, or gallop.  Skin: Warm, dry.  Midline sternal incision with keloids in the bottom third of incision.  Extremities: No " lower extremity edema.  Neurologic: No gross motor deficits. Alert, awake, and oriented to person, place and time.  Psychiatric: Affect appropriate.        Recent Lab Results:  LIPID RESULTS:  Lab Results   Component Value Date    CHOL 113 08/23/2024    CHOL 204 (H) 06/23/2021    HDL 48 08/23/2024    HDL 47 06/23/2021    LDL 42 08/23/2024     (H) 06/23/2021    TRIG 113 08/23/2024    TRIG 113 06/23/2021       LIVER ENZYME RESULTS:  Lab Results   Component Value Date    AST 26 12/22/2023    AST 20 06/23/2021    ALT 39 12/22/2023    ALT 36 06/23/2021       CBC RESULTS:  Lab Results   Component Value Date    WBC 10.2 12/28/2023    WBC 6.3 06/23/2021    RBC 3.91 (L) 12/28/2023    RBC 5.03 06/23/2021    HGB 11.7 (L) 12/28/2023    HGB 15.7 06/23/2021    HCT 36.1 (L) 12/28/2023    HCT 45.1 06/23/2021    MCV 92 12/28/2023    MCV 90 06/23/2021    MCH 29.9 12/28/2023    MCH 31.2 06/23/2021    MCHC 32.4 12/28/2023    MCHC 34.8 06/23/2021    RDW 11.8 12/28/2023    RDW 12.7 06/23/2021     12/28/2023     06/23/2021       BMP RESULTS:  Lab Results   Component Value Date     02/01/2024     06/23/2021    POTASSIUM 4.3 02/01/2024    POTASSIUM 4.6 12/19/2023    POTASSIUM 4.5 07/13/2022    POTASSIUM 3.8 06/23/2021    CHLORIDE 105 02/01/2024    CHLORIDE 107 07/13/2022    CHLORIDE 108 06/23/2021    CO2 27 02/01/2024    CO2 29 07/13/2022    CO2 31 06/23/2021    ANIONGAP 9 02/01/2024    ANIONGAP 2 (L) 07/13/2022    ANIONGAP 1 (L) 06/23/2021     (H) 08/23/2024     (H) 12/22/2023    GLC 99 07/13/2022    GLC 94 06/23/2021    BUN 14.1 02/01/2024    BUN 15 07/13/2022    BUN 13 06/23/2021    CR 0.91 02/01/2024    CR 0.98 06/23/2021    GFRESTIMATED >90 02/01/2024    GFRESTIMATED 83 06/23/2021    GFRESTBLACK >90 06/23/2021    STEPHEN 9.3 02/01/2024    STEPHEN 8.5 06/23/2021        A1C RESULTS:  Lab Results   Component Value Date    A1C 5.2 08/23/2024    A1C 5.6 06/23/2021       INR RESULTS:  Lab Results    Component Value Date    INR 1.21 (H) 12/19/2023    INR 1.32 (H) 12/19/2023           CC  DIAMANTE Guerrero CNP  [address]  [place] [address]                  Thank you for allowing me to participate in the care of your patient.      Sincerely,     DIAMANTE Michelle CNP     [facility]  cc:   DIAMANTE Guerrero CNP  [address]  [place] [address]

## 2024-11-14 NOTE — PATIENT INSTRUCTIONS
??????? [facility] ?????? - ????    ?????    1. ??????????????????  2. ?????????????????????????????  3. ?????????????????????????????????? 1 ???????? 5 ???????????????????????? 2 ??????????? 911?  4. ??????????????    ???  1. ????????? 6 ????[facility] [place] ????????Dr. Gloria?     ???????[phone]  ????? RN ~ [phone]    ?????????     ???????APRN?CNP  ??????   [facility] ????  2024 ? 11 ? 14 ?  _______________________________________________________  G?nxiè nín j?nti?n gu?[facility] x?nzàng hùl? zh?nsu? - ?i dí nà.    J?nrì z?ngjié:    1. Cóng nèix?n de ji?odù lái kàn, shìqíng kàn q?lái h?n w?ndìng.  2. Rúgu? nín de xi?ngtòng méiy?u g?ishàn, w?men k?y? k?ol? k?robin? fúyòng cháng xiào xi?osu?n g?nyedil yàowù  3. W? y?j?ng g?njù x?yào f?sòngle xi?osu?n g?nyóu ch?f?ng. Zhè j?n zài xi?ngtòng, y?pò g?n, xi?ngmèn shí x?yào. Ji?ng 1 mildred edwardsi shé xià, d?ngdài 5 f?nzh?ng. Rúgu? méiy?u g?ishàn, nín k?y? chóngfù lìng y? jì. Rúgu? fúyòng dì 2 jì hòu réng méiy?u g?ishàn, q?ng b?d? 911.  4. Nín de xi?y? hé d?ngùchún kàn q?lái bu cuò!    Hòuxù:  1. D?ng nín cóng zh?ngguó huílái hòu de 6 gè yuè nè,[facility] de x?nzàng anandg xué suíf?ng:Dr. Gloria.     X?nz?g b?g torres ?np??[phone]  x?nzàng bìng zh?nsu? RN ~ [phone]    j?nti?n h?n g?oxìng kvngn dào n?.     ?nn? mài j?n nóng,APRN,CNP  [facility] x?nz[facility] hùl?  2024 nián 11 yuè 14 rì  _____________________________________                  Thank you for your visit with the [facility] today.    Today's Summary:    Things look stable from a heart standpoint.  We can consider starting a long acting nitroglycerin medicine if your chest pain doesn't improve  I've sent a prescription for as needed nitroglycerin. This is only as needed for chest pain, pressure, tightness. Place 1 tablet under your tongue, wait 5 minutes. If not improved you can repeat another dose. If after a 2 nd dose,  no improvement than call 911.  Your blood pressure and cholesterol look good!    Follow-up:  Cardiology follow up at [facility]: Dr. Gloria in 6 months when you return from [place].     Cardiology Scheduling ~[phone]  Cardiology Clinic RN ~ [phone]    It was a pleasure seeing you today.     DIAMANTE Michelle, CNP  Certified Nurse Practitioner  [facility]  November 14, 2024  ________________________________________________________

## 2024-11-14 NOTE — PROGRESS NOTES
~Cardiology Clinic Visit~    Primary Cardiologist: Dr. Gloria  Reason for visit: 6-month cardiology follow-up      Drew Bradford MRN# [id number]   YOB: 1960 Age: 64 year old       HPI:    Drew Bradford is a delightful 64 year old patient with past medical history significant for:    Coronary artery disease status post three-vessel CABG in 12/2023 (LIMA to LAD, radial artery to OM, SVG to PDA) 12/19/2023  Hypertension  Dyslipidemia      I had the opportunity to see  for cardiology follow-up today.  He follows with Dr. Gloria and was last seen by him on 2/2/2024.  Since that time he was seen by my colleague Harmony Lacey CNP on 4/2/2024 for review of diagnostic testing.  At the time of his last visit he was doing well from a cardiovascular standpoint and making steady progress postop recovery.  Postsurgery he had drainage around incision site and had a chest CT to rule out mediastinitis.  CT showed small pleural effusions and he was started on 5-day course of clindamycin with resolution.    He had an echocardiogram from 3/29/2024 which showed LVEF 60-65% with septal motion consistent with postoperative state.  Overall, no significant changes when compared to echocardiogram from 5/2023.    Mr. Bradford turns for 6-month follow-up.  Our visit today was done with the aid of a Mandarin .  Since seen last, he reports feeling improved from a cardiac standpoint.  He does note continued intermittent dull chest pain that has been ongoing since surgery.  There is no pattern to this pain however it has slowly improved over time.  He also notes some dyspnea on exertion with activities like brisk walking, this is also unchanged.  No lower extremity swelling, orthopnea, or PND.  Occasional lightheadedness with change in position.  No syncope.     Diagnotic studies:  Echocardiogram 3/29/2024:  Left ventricular systolic function is normal.  The visual ejection fraction is 60-65%.  The left ventricle is  normal in size.  The right ventricle is normal in structure, function and size.  Doppler interrogation does not demonstrate signifcant stenosis or  insufficiency involving cardiac valves.     No significant change since 12/22/2023  US Carotid bilateral 12/17/2023:  1.  Mild plaque formation, velocities consistent with less than 50% stenosis in the right internal carotid artery.  2.  Mild plaque formation, velocities consistent with less than 50% stenosis in the left internal carotid artery.  3.  Flow within the vertebral arteries is antegrade.  Coronary angiogram 12/16/2023:  Severe multi-vessel coronary artery disease with 95% ostial stenosis of the LAD, 90% mid-vessel stenosis of a large bifurcating OM2, and subtotal 99% serial stenoses of the RPDA.   Uncomplicated right radial access.         Assessment:  CAD s/p three-vessel CABG  (LIMA to LAD, radial artery to OM, SVG to PDA) 12/19/2023.    Normal LV function on echocardiogram postsurgery.    On aspirin, statin, beta-blocker.  Dyslipidemia  Lipid panel 8/23/2024: Cholesterol 113, , HDL 40, LDL 42  Atorvastatin 40 mg    Hypertension  Well-controlled on amlodipine and metoprolol  BP today 122/64    Plan:   It was my pleasure to see Mr. Bradford for cardiology follow-up today.  Overall he is stable from a cardiovascular standpoint.  He does note continued dull intermittent chest discomfort which has been ongoing since surgery and gradually improving.  We discussed a trial of long-acting nitrates however he would prefer to hold off at this point.  Can consider down the road if pain is not improved.  Blood pressure and cholesterol are well-controlled today.  No changes.  I have sent a prescription for as needed nitroglycerin and reviewed indications and instructions for use with   Follow-up in 6 months with Dr. Gloria for routine follow-up, sooner if any new concerns.    Thank you for the opportunity to participate in this pleasant patient's care.    We would be  happy to see this patient sooner for any concerns in the meantime.    DIAMANTE Michelle, CNP   Nurse Practitioner  [facility]      Today's clinic visit entailed:  A total of 50 minutes was spent with patient, on chart review and documentation today.     The longitudinal plan of care for the diagnosis(es)/condition(s) as documented were addressed during this visit. Due to the added complexity in care, I will continue to support Drew in the subsequent management and with ongoing continuity of care.        Orders Placed This Encounter   Procedures    Follow-Up with Cardiology     Orders Placed This Encounter   Medications    nitroGLYcerin (NITROSTAT) 0.4 MG sublingual tablet     Sig: For chest pain place 1 tablet under the tongue every 5 minutes for 3 doses. If symptoms persist 5 minutes after 1st dose call 911.     Dispense:  30 tablet     Refill:  1     There are no discontinued medications.      Encounter Diagnoses   Name Primary?    S/P CABG (coronary artery bypass graft)     Coronary artery disease involving native coronary artery of native heart without angina pectoris        CURRENT MEDICATIONS:  Current Outpatient Medications   Medication Sig Dispense Refill    amLODIPine (NORVASC) 2.5 MG tablet Take 1 tablet (2.5 mg) by mouth daily (Patient taking differently: Take 2.5 mg by mouth daily. Together with 5mg daily for total of 7.5 mg.) 90 tablet 3    amLODIPine (NORVASC) 5 MG tablet Take 1 tablet (5 mg) by mouth daily 90 tablet 3    aspirin (ASA) 81 MG chewable tablet Take 2 tablets (162 mg) by mouth daily 90 tablet 4    atorvastatin (LIPITOR) 40 MG tablet Take 1 tablet (40 mg) by mouth daily 90 tablet 3    calcium carbonate (OS-STEPHEN) 500 MG tablet Take 1 tablet by mouth 2 times daily      metoprolol succinate ER (TOPROL XL) 25 MG 24 hr tablet Take 1 tablet (25 mg) by mouth daily 90 tablet 3    nitroGLYcerin (NITROSTAT) 0.4 MG sublingual tablet For chest pain place 1 tablet under the  tongue every 5 minutes for 3 doses. If symptoms persist 5 minutes after 1st dose call 911. 30 tablet 1    acetaminophen (TYLENOL) 80 MG chewable tablet Take 80 mg by mouth every 4 hours as needed for mild pain or fever (Patient not taking: Reported on 11/14/2024)      polyethylene glycol (MIRALAX) 17 GM/Dose powder Take 17 g by mouth daily as needed for constipation (Patient not taking: Reported on 11/14/2024) 510 g 0    senna-docusate (SENOKOT-S/PERICOLACE) 8.6-50 MG tablet Take 1 tablet by mouth 2 times daily as needed for constipation (Patient not taking: Reported on 11/14/2024) 30 tablet 0       ALLERGIES     Allergies   Allergen Reactions    Cephalosporins Anaphylaxis    Penicillin G Anaphylaxis    Amoxicillin Cramps     Kidney problem    Fish Oil Hives    Shrimp Hives    Penicillins     Amoxicillin Rash     Back pain, kidney pain.     Misc. Sulfonamide Containing Compounds        PAST MEDICAL HISTORY:  Past Medical History:   Diagnosis Date    HTN (hypertension)     Hyperlipidemia LDL goal <100     Nocturia     Presbyopia     Seasonal allergies        PAST SURGICAL HISTORY:  Past Surgical History:   Procedure Laterality Date    BYPASS GRAFT ARTERY CORONARY N/A 12/19/2023    Procedure: CORONARY ARTERY BYPASS GRAFT x 3 (LEFT INTERNAL MAMMARY ARTERY - LEFT ANTERIOR DESCENDING ARTERY; RADIAL ARTERY - OBTUSE MARGIN; SAPHENOUS VEIN - POSTERIOR DESCENDING ARTERY), ENDOSCOPIC SAPHENOUS VEIN HARVEST ON THE LEFT LOWER EXTREMITY, ENDOSCOPIC RADIAL ARTERY HARVEST ON THE LEFT UPPER EXTREMITY, AND ON CARDIOPULMONARY PUMP OXYGENATOR    (INTRAOPERATIVE TRANSESOPHAGEAL ECHOCARDIOGRAM     COLONOSCOPY N/A 7/16/2018    Procedure: COLONOSCOPY;  colonoscopy;  Surgeon: Geremias Strickland MD;  Location:  GI    CV CORONARY ANGIOGRAM N/A 12/16/2023    Procedure: Coronary Angiogram;  Surgeon: Maverick Redmond MD;  Location:  [facility]ATH [facility]    ESOPHAGOSCOPY, GASTROSCOPY, DUODENOSCOPY (EGD), COMBINED N/A 1/14/2020     Procedure: ESOPHAGOGASTRODUODENOSCOPY, WITH BIOPSY;  Surgeon: Tez Vences MD;  Location: [facility] REMOVAL OF KIDNEY STONE         FAMILY HISTORY:  Family History   Problem Relation Age of Onset    No Known Problems Mother     No Known Problems Father        SOCIAL HISTORY:  Social History     Socioeconomic History    Marital status:    Tobacco Use    Smoking status: Former    Smokeless tobacco: Never    Tobacco comments:     Quit 20 years ago   Vaping Use    Vaping status: Never Used   Substance and Sexual Activity    Alcohol use: Not Currently     Alcohol/week: 2.0 standard drinks of alcohol     Types: 2 Cans of beer per week     Comment: not drinking anymore per pt    Drug use: Never    Sexual activity: Yes     Partners: Female   Social History Narrative    ** Merged History Encounter **          Social Drivers of Health     Financial Resource Strain: Low Risk  (8/23/2024)    Financial Resource Strain     Within the past 12 months, have you or your family members you live with been unable to get utilities (heat, electricity) when it was really needed?: No   Food Insecurity: Low Risk  (8/23/2024)    Food Insecurity     Within the past 12 months, did you worry that your food would run out before you got money to buy more?: No     Within the past 12 months, did the food you bought just not last and you didn t have money to get more?: No   Transportation Needs: Low Risk  (8/23/2024)    Transportation Needs     Within the past 12 months, has lack of transportation kept you from medical appointments, getting your medicines, non-medical meetings or appointments, work, or from getting things that you need?: No   Physical Activity: Sufficiently Active (8/23/2024)    Exercise Vital Sign     Days of Exercise per Week: 5 days     Minutes of Exercise per Session: 30 min   Stress: No Stress Concern Present (8/23/2024)    [employer] [place] [employer] - Occupational Stress Questionnaire     Feeling of  "Stress : Only a little   Social Connections: Unknown (8/23/2024)    Social Connection and Isolation Panel [NHANES]     Frequency of Social Gatherings with Friends and Family: Once a week   Interpersonal Safety: Low Risk  (8/23/2024)    Interpersonal Safety     Do you feel physically and emotionally safe where you currently live?: Yes     Within the past 12 months, have you been hit, slapped, kicked or otherwise physically hurt by someone?: No     Within the past 12 months, have you been humiliated or emotionally abused in other ways by your partner or ex-partner?: No   Housing Stability: Low Risk  (8/23/2024)    Housing Stability     Do you have housing? : Yes     Are you worried about losing your housing?: No       Review of Systems:  Skin:  Positive for     Eyes:       ENT:       Respiratory:  Positive for sleep apnea;CPAP  Cardiovascular:  Negative;syncope or near-syncope;cyanosis;edema;fatigue;exercise intolerance Positive for;chest pain  Gastroenterology:      Genitourinary:       Musculoskeletal:       Neurologic:       Psychiatric:       Heme/Lymph/Imm:       Endocrine:         Physical Exam:    Vitals: /64   Pulse 62   Ht 1.753 m (5' 9\")   Wt 85.3 kg (188 lb)   SpO2 99%   BMI 27.76 kg/m    Constitutional: Well nourished and in no apparent distress.  Eyes: Pupils equal, round. Sclerae anicteric.   HEENT: Normocephalic, atraumatic.   Neck: Supple.   Respiratory: Breathing non-labored. Lungs clear to auscultation bilaterally. No crackles, wheezes, rhonchi, or rales.  Cardiovascular:  Regular rate and rhythm, normal S1 and S2. No murmur, rub, or gallop.  Skin: Warm, dry.  Midline sternal incision with keloids in the bottom third of incision.  Extremities: No lower extremity edema.  Neurologic: No gross motor deficits. Alert, awake, and oriented to person, place and time.  Psychiatric: Affect appropriate.        Recent Lab Results:  LIPID RESULTS:  Lab Results   Component Value Date    CHOL 113 " 08/23/2024    CHOL 204 (H) 06/23/2021    HDL 48 08/23/2024    HDL 47 06/23/2021    LDL 42 08/23/2024     (H) 06/23/2021    TRIG 113 08/23/2024    TRIG 113 06/23/2021       LIVER ENZYME RESULTS:  Lab Results   Component Value Date    AST 26 12/22/2023    AST 20 06/23/2021    ALT 39 12/22/2023    ALT 36 06/23/2021       CBC RESULTS:  Lab Results   Component Value Date    WBC 10.2 12/28/2023    WBC 6.3 06/23/2021    RBC 3.91 (L) 12/28/2023    RBC 5.03 06/23/2021    HGB 11.7 (L) 12/28/2023    HGB 15.7 06/23/2021    HCT 36.1 (L) 12/28/2023    HCT 45.1 06/23/2021    MCV 92 12/28/2023    MCV 90 06/23/2021    MCH 29.9 12/28/2023    MCH 31.2 06/23/2021    MCHC 32.4 12/28/2023    MCHC 34.8 06/23/2021    RDW 11.8 12/28/2023    RDW 12.7 06/23/2021     12/28/2023     06/23/2021       BMP RESULTS:  Lab Results   Component Value Date     02/01/2024     06/23/2021    POTASSIUM 4.3 02/01/2024    POTASSIUM 4.6 12/19/2023    POTASSIUM 4.5 07/13/2022    POTASSIUM 3.8 06/23/2021    CHLORIDE 105 02/01/2024    CHLORIDE 107 07/13/2022    CHLORIDE 108 06/23/2021    CO2 27 02/01/2024    CO2 29 07/13/2022    CO2 31 06/23/2021    ANIONGAP 9 02/01/2024    ANIONGAP 2 (L) 07/13/2022    ANIONGAP 1 (L) 06/23/2021     (H) 08/23/2024     (H) 12/22/2023    GLC 99 07/13/2022    GLC 94 06/23/2021    BUN 14.1 02/01/2024    BUN 15 07/13/2022    BUN 13 06/23/2021    CR 0.91 02/01/2024    CR 0.98 06/23/2021    GFRESTIMATED >90 02/01/2024    GFRESTIMATED 83 06/23/2021    GFRESTBLACK >90 06/23/2021    STEPHEN 9.3 02/01/2024    STEPHEN 8.5 06/23/2021        A1C RESULTS:  Lab Results   Component Value Date    A1C 5.2 08/23/2024    A1C 5.6 06/23/2021       INR RESULTS:  Lab Results   Component Value Date    INR 1.21 (H) 12/19/2023    INR 1.32 (H) 12/19/2023           CC  DIAMANTE Guerrero CNP  [address]  [place] [address]

## 2025-03-15 DIAGNOSIS — Z95.1 S/P CABG (CORONARY ARTERY BYPASS GRAFT): ICD-10-CM

## 2025-03-17 RX ORDER — METOPROLOL SUCCINATE 25 MG/1
25 TABLET, EXTENDED RELEASE ORAL DAILY
Qty: 90 TABLET | Refills: 0 | Status: SHIPPED | OUTPATIENT
Start: 2025-03-17

## 2025-06-09 ENCOUNTER — OFFICE VISIT (OUTPATIENT)
Dept: CARDIOLOGY | Facility: CLINIC | Age: 65
End: 2025-06-09
Payer: COMMERCIAL

## 2025-06-09 VITALS
DIASTOLIC BLOOD PRESSURE: 84 MMHG | SYSTOLIC BLOOD PRESSURE: 144 MMHG | WEIGHT: 187 LBS | HEIGHT: 69 IN | HEART RATE: 61 BPM | OXYGEN SATURATION: 99 % | BODY MASS INDEX: 27.7 KG/M2

## 2025-06-09 DIAGNOSIS — I25.10 CORONARY ARTERY DISEASE INVOLVING NATIVE CORONARY ARTERY OF NATIVE HEART WITHOUT ANGINA PECTORIS: ICD-10-CM

## 2025-06-09 DIAGNOSIS — E78.5 HYPERLIPIDEMIA LDL GOAL <70: ICD-10-CM

## 2025-06-09 DIAGNOSIS — I10 ESSENTIAL HYPERTENSION: ICD-10-CM

## 2025-06-09 DIAGNOSIS — R07.89 ATYPICAL CHEST PAIN: Primary | ICD-10-CM

## 2025-06-09 DIAGNOSIS — Z95.1 S/P CABG (CORONARY ARTERY BYPASS GRAFT): ICD-10-CM

## 2025-06-09 RX ORDER — AMLODIPINE BESYLATE 10 MG/1
10 TABLET ORAL DAILY
Qty: 90 TABLET | Refills: 3 | Status: SHIPPED | OUTPATIENT
Start: 2025-06-09

## 2025-06-09 RX ORDER — ASPIRIN 81 MG/1
162 TABLET, CHEWABLE ORAL DAILY
Qty: 90 TABLET | Refills: 4 | Status: SHIPPED | OUTPATIENT
Start: 2025-06-09

## 2025-06-09 RX ORDER — ATORVASTATIN CALCIUM 40 MG/1
40 TABLET, FILM COATED ORAL DAILY
Qty: 90 TABLET | Refills: 3 | Status: SHIPPED | OUTPATIENT
Start: 2025-06-09

## 2025-06-09 RX ORDER — AMLODIPINE BESYLATE 2.5 MG/1
2.5 TABLET ORAL DAILY
Qty: 180 TABLET | Refills: 3 | Status: CANCELLED | OUTPATIENT
Start: 2025-06-09

## 2025-06-09 NOTE — LETTER
6/9/2025    Tara Mehta MD  [address]  [place] [address]    RE: Drew Bradford       Dear Colleague,     I had the pleasure of seeing Drew Bradford in the [facility].    Cardiology Clinic Progress Note  Drew Bradford MRN# [id number]   YOB: 1960 Age: 64 year old   Primary Cardiologist: Dr. Gloria  Reason for visit: 6-month follow-up CAD            Assessment and Plan:     Coronary artery disease status post three-vessel CABG in 12/2023 (LIMA to LAD, radial artery to OM, SVG to PDA) 12/19/2023  - 3/2024 TTE with normal left ventricular function postsurgery  -On aspirin, statin, beta-blocker    Hypertension, uncontrolled on current regimen     Dyslipidemia, with LDL at goal  - 8/2024 lipid profile with LDL 42    4. Episode of brief chest pain without recurrence   -Continues to have excellent exertional tolerance however episode of chest pain and weakness occurring at rest could also represent angina     Plan:  Continue aspirin 81 mg daily and atorvastatin 40 mg daily  Continue metoprolol XL 25mg daily   Increase the amlodipine to 10mg daily   FLP/ALT in 2 months  5.   Exercise stress echocardiogram for ischemic evaluation considering episode of chest pain, will contact him following the results      Follow up: Follow up with Dr. Gloria in 6 months or sooner if needed based on the above testing         History of Presenting Illness:    Drew Bradford is a very pleasant 64 year old male with a history of coronary artery disease s/p three-vessel CABG in 12/2023, hypertension, and dyslipidemia.    Patient was admitted in December 2023 with an NSTEMI.  He underwent a coronary angiogram which showed three-vessel disease and subsequent bypass grafting x 3.  Patient was seen by Dr. Gloria in February 2024 following his surgery at which time he was doing .     In April 2024, he was seen in clinic and making good postoperative progress.  Postsurgery he had drainage around his incision site and a chest  CT to rule out mediastinitis.  It showed an ill defined fluid collection int he prevascular space he was started on a 5-day course of clindamycin with resolution.      In March 2024 he had a postoperative transthoracic echo which showed normal left ventricular ejection fraction of 60 to 65% with septal motion consistent with postoperative state.  Overall there were no significant changes noted when compared to his echocardiogram from May of the year prior.    He was last seen by Ramona Jones NP in November 2024 at which point he noted continued intermittent dull chest pain which was ongoing since surgery.  He was also having dyspnea on exertion with brisk walking which was relatively stable.    Patient is here today for a 6 month follow up from his coronary artery bypass grafting. Our visit is assisted by a Mandarin video .     Patient reports feeling good. One day a few weeks ago while driving, he had a sharp chest pain with associated weakness that resolved within a few minutes rest. The pain did not radiate. Since that time, he has been exercising without any exertional symptoms.     He is quite worried about the life span of his bypass grafts.     Denies dizziness, lightheadedness or other presyncopal symptoms. Denies tachycardia or palpitations. Denies shortness of breath, orthopnea, or edema.    Most recent labs from 8/2024 show total cholesterol 113, HDL 48, LDL 42, triglycerides 113, hemoglobin A1c 5.2%.     Blood pressure 141/88 and HR 61 in clinic today. At home typically his readings are in the 130/80 range.         Recent Hospitalizations   None in 2025           Social History      Social History     Socioeconomic History     Marital status:      Spouse name: Not on file     Number of children: Not on file     Years of education: Not on file     Highest education level: Not on file   Occupational History     Not on file   Tobacco Use     Smoking status: Former     Smokeless tobacco:  Never     Tobacco comments:     Quit 20 years ago   Vaping Use     Vaping status: Never Used   Substance and Sexual Activity     Alcohol use: Not Currently     Alcohol/week: 2.0 standard drinks of alcohol     Types: 2 Cans of beer per week     Comment: not drinking anymore per pt     Drug use: Never     Sexual activity: Yes     Partners: Female   Other Topics Concern     Parent/sibling w/ CABG, MI or angioplasty before 65F 55M? Not Asked   Social History Narrative    ** Merged History Encounter **          Social Drivers of Health     Financial Resource Strain: Low Risk  (8/23/2024)    Financial Resource Strain      Within the past 12 months, have you or your family members you live with been unable to get utilities (heat, electricity) when it was really needed?: No   Food Insecurity: Low Risk  (8/23/2024)    Food Insecurity      Within the past 12 months, did you worry that your food would run out before you got money to buy more?: No      Within the past 12 months, did the food you bought just not last and you didn t have money to get more?: No   Transportation Needs: Low Risk  (8/23/2024)    Transportation Needs      Within the past 12 months, has lack of transportation kept you from medical appointments, getting your medicines, non-medical meetings or appointments, work, or from getting things that you need?: No   Physical Activity: Sufficiently Active (8/23/2024)    Exercise Vital Sign      Days of Exercise per Week: 5 days      Minutes of Exercise per Session: 30 min   Stress: No Stress Concern Present (8/23/2024)    [place] [employer] - Occupational Stress Questionnaire      Feeling of Stress : Only a little   Social Connections: Unknown (8/23/2024)    Social Connection and Isolation Panel [NHANES]      Frequency of Communication with Friends and Family: Not on file      Frequency of Social Gatherings with Friends and Family: Once a week      Attends Anglican Services: Not on file       "Active Member of Clubs or Organizations: Not on file      Attends Club or Organization Meetings: Not on file      Marital Status: Not on file   Interpersonal Safety: Low Risk  (8/23/2024)    Interpersonal Safety      Do you feel physically and emotionally safe where you currently live?: Yes      Within the past 12 months, have you been hit, slapped, kicked or otherwise physically hurt by someone?: No      Within the past 12 months, have you been humiliated or emotionally abused in other ways by your partner or ex-partner?: No   Housing Stability: Low Risk  (8/23/2024)    Housing Stability      Do you have housing? : Yes      Are you worried about losing your housing?: No            Review of Systems:   Skin:  Positive for     Eyes:       ENT:       Respiratory:  Positive for sleep apnea, CPAP  Cardiovascular:  Negative, syncope or near-syncope, cyanosis, edema, fatigue, exercise intolerance, dizziness, chest pain    Gastroenterology:      Genitourinary:       Musculoskeletal:       Neurologic:       Psychiatric:       Heme/Lymph/Imm:       Endocrine:              Physical Exam:   Vitals: BP (!) 141/88 (BP Location: Right arm, Patient Position: Sitting)   Pulse 61   Ht 1.753 m (5' 9\")   Wt 84.8 kg (187 lb)   SpO2 99%   BMI 27.62 kg/m     Wt Readings from Last 4 Encounters:   06/09/25 84.8 kg (187 lb)   11/14/24 85.3 kg (188 lb)   08/23/24 82.6 kg (182 lb)   07/08/24 83.9 kg (185 lb)     GEN: well nourished, in no acute distress.  HEENT:  Pupils equal, round. Sclerae nonicteric.   NECK: Supple, no masses appreciated.   C/V:  Regular rate and rhythm, no murmur, rub or gallop.    RESP: Respirations are unlabored. Clear to auscultation bilaterally without wheezing, rales, or rhonchi.  GI: Abdomen soft, nontender.  EXTREM: No LE edema.  NEURO: Alert and oriented, cooperative.  SKIN: Warm and dry.        Data:     LIPID RESULTS:  Lab Results   Component Value Date    CHOL 113 08/23/2024    CHOL 204 (H) 06/23/2021    " HDL 48 08/23/2024    HDL 47 06/23/2021    LDL 42 08/23/2024     (H) 06/23/2021    TRIG 113 08/23/2024    TRIG 113 06/23/2021     LIVER ENZYME RESULTS:  Lab Results   Component Value Date    AST 26 12/22/2023    AST 20 06/23/2021    ALT 39 12/22/2023    ALT 36 06/23/2021     CBC RESULTS:  Lab Results   Component Value Date    WBC 10.2 12/28/2023    WBC 6.3 06/23/2021    RBC 3.91 (L) 12/28/2023    RBC 5.03 06/23/2021    HGB 11.7 (L) 12/28/2023    HGB 15.7 06/23/2021    HCT 36.1 (L) 12/28/2023    HCT 45.1 06/23/2021    MCV 92 12/28/2023    MCV 90 06/23/2021    MCH 29.9 12/28/2023    MCH 31.2 06/23/2021    MCHC 32.4 12/28/2023    MCHC 34.8 06/23/2021    RDW 11.8 12/28/2023    RDW 12.7 06/23/2021     12/28/2023     06/23/2021     BMP RESULTS:  Lab Results   Component Value Date     02/01/2024     06/23/2021    POTASSIUM 4.3 02/01/2024    POTASSIUM 4.6 12/19/2023    POTASSIUM 4.5 07/13/2022    POTASSIUM 3.8 06/23/2021    CHLORIDE 105 02/01/2024    CHLORIDE 107 07/13/2022    CHLORIDE 108 06/23/2021    CO2 27 02/01/2024    CO2 29 07/13/2022    CO2 31 06/23/2021    ANIONGAP 9 02/01/2024    ANIONGAP 2 (L) 07/13/2022    ANIONGAP 1 (L) 06/23/2021     (H) 08/23/2024     (H) 12/22/2023    GLC 99 07/13/2022    GLC 94 06/23/2021    BUN 14.1 02/01/2024    BUN 15 07/13/2022    BUN 13 06/23/2021    CR 0.91 02/01/2024    CR 0.98 06/23/2021    GFRESTIMATED >90 02/01/2024    GFRESTIMATED 83 06/23/2021    GFRESTBLACK >90 06/23/2021    STEPHEN 9.3 02/01/2024    STEPHEN 8.5 06/23/2021      A1C RESULTS:  Lab Results   Component Value Date    A1C 5.2 08/23/2024    A1C 5.6 06/23/2021     INR RESULTS:  Lab Results   Component Value Date    INR 1.21 (H) 12/19/2023    INR 1.32 (H) 12/19/2023            Medications     Current Outpatient Medications   Medication Sig Dispense Refill     acetaminophen (TYLENOL) 80 MG chewable tablet Take 80 mg by mouth every 4 hours as needed for mild pain or fever        amLODIPine (NORVASC) 2.5 MG tablet Take 1 tablet (2.5 mg) by mouth daily (Patient taking differently: Take 2.5 mg by mouth daily. Together with 5mg daily for total of 7.5 mg.) 90 tablet 3     amLODIPine (NORVASC) 5 MG tablet Take 1 tablet (5 mg) by mouth daily 90 tablet 3     aspirin (ASA) 81 MG chewable tablet Take 2 tablets (162 mg) by mouth daily 90 tablet 4     atorvastatin (LIPITOR) 40 MG tablet Take 1 tablet (40 mg) by mouth daily 90 tablet 3     calcium carbonate (OS-STEPHEN) 500 MG tablet Take 1 tablet by mouth 2 times daily       metoprolol succinate ER (TOPROL XL) 25 MG 24 hr tablet Take 1 tablet (25 mg) by mouth daily 90 tablet 0     nitroGLYcerin (NITROSTAT) 0.4 MG sublingual tablet For chest pain place 1 tablet under the tongue every 5 minutes for 3 doses. If symptoms persist 5 minutes after 1st dose call 911. 30 tablet 1     polyethylene glycol (MIRALAX) 17 GM/Dose powder Take 17 g by mouth daily as needed for constipation (Patient not taking: Reported on 6/9/2025) 510 g 0     senna-docusate (SENOKOT-S/PERICOLACE) 8.6-50 MG tablet Take 1 tablet by mouth 2 times daily as needed for constipation (Patient not taking: Reported on 6/9/2025) 30 tablet 0          Past Medical History     Past Medical History:   Diagnosis Date     HTN (hypertension)      Hyperlipidemia LDL goal <100      Nocturia      Presbyopia      Seasonal allergies      Past Surgical History:   Procedure Laterality Date     BYPASS GRAFT ARTERY CORONARY N/A 12/19/2023    Procedure: CORONARY ARTERY BYPASS GRAFT x 3 (LEFT INTERNAL MAMMARY ARTERY - LEFT ANTERIOR DESCENDING ARTERY; RADIAL ARTERY - OBTUSE MARGIN; SAPHENOUS VEIN - POSTERIOR DESCENDING ARTERY), ENDOSCOPIC SAPHENOUS VEIN HARVEST ON THE LEFT LOWER EXTREMITY, ENDOSCOPIC RADIAL ARTERY HARVEST ON THE LEFT UPPER EXTREMITY, AND ON CARDIOPULMONARY PUMP OXYGENATOR    (INTRAOPERATIVE TRANSESOPHAGEAL ECHOCARDIOGRAM      COLONOSCOPY N/A 7/16/2018    Procedure: COLONOSCOPY;  colonoscopy;  Surgeon:  Geremias Strickland MD;  Location:  GI     CV CORONARY ANGIOGRAM N/A 12/16/2023    Procedure: Coronary Angiogram;  Surgeon: Maverick Redmond MD;  Location:  HEART CARDIAC CATH LAB     ESOPHAGOSCOPY, GASTROSCOPY, DUODENOSCOPY (EGD), COMBINED N/A 1/14/2020    Procedure: ESOPHAGOGASTRODUODENOSCOPY, WITH BIOPSY;  Surgeon: Tez Vences MD;  Location:  GI     ZZC REMOVAL OF KIDNEY STONE       Family History   Problem Relation Age of Onset     No Known Problems Mother      No Known Problems Father             Allergies   Cephalosporins, Penicillin g, Amoxicillin, Fish oil, Shrimp, Penicillins, Amoxicillin, and Misc. sulfonamide containing compounds        Aida North NP  [facility]     Thank you for allowing me to participate in the care of your patient.      Sincerely,     Aida North NP     [facility]  cc:   Ramona Otto, DIAMANTE CNP  [address], [id number]  [place] [address]

## 2025-06-09 NOTE — PATIENT INSTRUCTIONS
Today's Recommendations    Increase your amlodipine to 10mg daily   I would like you to do an exercise stress test   You have to hold your metoprolol the day before your test and the morning of your test  You are due for a cholesterol check in August if you dont get this done with your primary doctor   Continue all other medications without changes.  Please follow up with Dr. velasquez in 6 months, call in 1 month to set up this appointment .    Please send a [employer] message or call [phone] to the RN team with questions or concerns.     Scheduling number [phone]  DIAMANTE Perez, CNP

## 2025-06-09 NOTE — PROGRESS NOTES
Cardiology Clinic Progress Note  Drew Bradford MRN# [id number]   YOB: 1960 Age: 64 year old   Primary Cardiologist: Dr. Gloria  Reason for visit: 6-month follow-up CAD            Assessment and Plan:     Coronary artery disease status post three-vessel CABG in 12/2023 (LIMA to LAD, radial artery to OM, SVG to PDA) 12/19/2023  - 3/2024 TTE with normal left ventricular function postsurgery  -On aspirin, statin, beta-blocker    Hypertension, uncontrolled on current regimen     Dyslipidemia, with LDL at goal  - 8/2024 lipid profile with LDL 42    4. Episode of brief chest pain without recurrence   -Continues to have excellent exertional tolerance however episode of chest pain and weakness occurring at rest could also represent angina     Plan:  Continue aspirin 81 mg daily and atorvastatin 40 mg daily  Continue metoprolol XL 25mg daily   Increase the amlodipine to 10mg daily   FLP/ALT in 2 months  5.   Exercise stress echocardiogram for ischemic evaluation considering episode of chest pain, will contact him following the results      Follow up: Follow up with Dr. Gloria in 6 months or sooner if needed based on the above testing         History of Presenting Illness:    Drew Bradford is a very pleasant 64 year old male with a history of coronary artery disease s/p three-vessel CABG in 12/2023, hypertension, and dyslipidemia.    Patient was admitted in December 2023 with an NSTEMI.  He underwent a coronary angiogram which showed three-vessel disease and subsequent bypass grafting x 3.  Patient was seen by Dr. Gloria in February 2024 following his surgery at which time he was doing .     In April 2024, he was seen in clinic and making good postoperative progress.  Postsurgery he had drainage around his incision site and a chest CT to rule out mediastinitis.  It showed an ill defined fluid collection int he prevascular space he was started on a 5-day course of clindamycin with resolution.      In March 2024 he had a  postoperative transthoracic echo which showed normal left ventricular ejection fraction of 60 to 65% with septal motion consistent with postoperative state.  Overall there were no significant changes noted when compared to his echocardiogram from May of the year prior.    He was last seen by Ramona Jones NP in November 2024 at which point he noted continued intermittent dull chest pain which was ongoing since surgery.  He was also having dyspnea on exertion with brisk walking which was relatively stable.    Patient is here today for a 6 month follow up from his coronary artery bypass grafting. Our visit is assisted by a Mandarin video .     Patient reports feeling good. One day a few weeks ago while driving, he had a sharp chest pain with associated weakness that resolved within a few minutes rest. The pain did not radiate. Since that time, he has been exercising without any exertional symptoms.     He is quite worried about the life span of his bypass grafts.     Denies dizziness, lightheadedness or other presyncopal symptoms. Denies tachycardia or palpitations. Denies shortness of breath, orthopnea, or edema.    Most recent labs from 8/2024 show total cholesterol 113, HDL 48, LDL 42, triglycerides 113, hemoglobin A1c 5.2%.     Blood pressure 141/88 and HR 61 in clinic today. At home typically his readings are in the 130/80 range.         Recent Hospitalizations   None in 2025           Social History      Social History     Socioeconomic History    Marital status:      Spouse name: Not on file    Number of children: Not on file    Years of education: Not on file    Highest education level: Not on file   Occupational History    Not on file   Tobacco Use    Smoking status: Former    Smokeless tobacco: Never    Tobacco comments:     Quit 20 years ago   Vaping Use    Vaping status: Never Used   Substance and Sexual Activity    Alcohol use: Not Currently     Alcohol/week: 2.0 standard drinks of  alcohol     Types: 2 Cans of beer per week     Comment: not drinking anymore per pt    Drug use: Never    Sexual activity: Yes     Partners: Female   Other Topics Concern    Parent/sibling w/ CABG, MI or angioplasty before 65F 55M? Not Asked   Social History Narrative    ** Merged History Encounter **          Social Drivers of Health     Financial Resource Strain: Low Risk  (8/23/2024)    Financial Resource Strain     Within the past 12 months, have you or your family members you live with been unable to get utilities (heat, electricity) when it was really needed?: No   Food Insecurity: Low Risk  (8/23/2024)    Food Insecurity     Within the past 12 months, did you worry that your food would run out before you got money to buy more?: No     Within the past 12 months, did the food you bought just not last and you didn t have money to get more?: No   Transportation Needs: Low Risk  (8/23/2024)    Transportation Needs     Within the past 12 months, has lack of transportation kept you from medical appointments, getting your medicines, non-medical meetings or appointments, work, or from getting things that you need?: No   Physical Activity: Sufficiently Active (8/23/2024)    Exercise Vital Sign     Days of Exercise per Week: 5 days     Minutes of Exercise per Session: 30 min   Stress: No Stress Concern Present (8/23/2024)    [employer] - Occupational Stress Questionnaire     Feeling of Stress : Only a little   Social Connections: Unknown (8/23/2024)    Social Connection and Isolation Panel [NHANES]     Frequency of Communication with Friends and Family: Not on file     Frequency of Social Gatherings with Friends and Family: Once a week     Attends Yazdanism Services: Not on file     Active Member of Clubs or Organizations: Not on file     Attends Club or Organization Meetings: Not on file     Marital Status: Not on file   Interpersonal Safety: Low Risk  (8/23/2024)    Interpersonal Safety  "    Do you feel physically and emotionally safe where you currently live?: Yes     Within the past 12 months, have you been hit, slapped, kicked or otherwise physically hurt by someone?: No     Within the past 12 months, have you been humiliated or emotionally abused in other ways by your partner or ex-partner?: No   Housing Stability: Low Risk  (8/23/2024)    Housing Stability     Do you have housing? : Yes     Are you worried about losing your housing?: No            Review of Systems:   Skin:  Positive for     Eyes:       ENT:       Respiratory:  Positive for sleep apnea, CPAP  Cardiovascular:  Negative, syncope or near-syncope, cyanosis, edema, fatigue, exercise intolerance, dizziness, chest pain    Gastroenterology:      Genitourinary:       Musculoskeletal:       Neurologic:       Psychiatric:       Heme/Lymph/Imm:       Endocrine:              Physical Exam:   Vitals: BP (!) 141/88 (BP Location: Right arm, Patient Position: Sitting)   Pulse 61   Ht 1.753 m (5' 9\")   Wt 84.8 kg (187 lb)   SpO2 99%   BMI 27.62 kg/m     Wt Readings from Last 4 Encounters:   06/09/25 84.8 kg (187 lb)   11/14/24 85.3 kg (188 lb)   08/23/24 82.6 kg (182 lb)   07/08/24 83.9 kg (185 lb)     GEN: well nourished, in no acute distress.  HEENT:  Pupils equal, round. Sclerae nonicteric.   NECK: Supple, no masses appreciated.   C/V:  Regular rate and rhythm, no murmur, rub or gallop.    RESP: Respirations are unlabored. Clear to auscultation bilaterally without wheezing, rales, or rhonchi.  GI: Abdomen soft, nontender.  EXTREM: No LE edema.  NEURO: Alert and oriented, cooperative.  SKIN: Warm and dry.        Data:     LIPID RESULTS:  Lab Results   Component Value Date    CHOL 113 08/23/2024    CHOL 204 (H) 06/23/2021    HDL 48 08/23/2024    HDL 47 06/23/2021    LDL 42 08/23/2024     (H) 06/23/2021    TRIG 113 08/23/2024    TRIG 113 06/23/2021     LIVER ENZYME RESULTS:  Lab Results   Component Value Date    AST 26 12/22/2023    " AST 20 06/23/2021    ALT 39 12/22/2023    ALT 36 06/23/2021     CBC RESULTS:  Lab Results   Component Value Date    WBC 10.2 12/28/2023    WBC 6.3 06/23/2021    RBC 3.91 (L) 12/28/2023    RBC 5.03 06/23/2021    HGB 11.7 (L) 12/28/2023    HGB 15.7 06/23/2021    HCT 36.1 (L) 12/28/2023    HCT 45.1 06/23/2021    MCV 92 12/28/2023    MCV 90 06/23/2021    MCH 29.9 12/28/2023    MCH 31.2 06/23/2021    MCHC 32.4 12/28/2023    MCHC 34.8 06/23/2021    RDW 11.8 12/28/2023    RDW 12.7 06/23/2021     12/28/2023     06/23/2021     BMP RESULTS:  Lab Results   Component Value Date     02/01/2024     06/23/2021    POTASSIUM 4.3 02/01/2024    POTASSIUM 4.6 12/19/2023    POTASSIUM 4.5 07/13/2022    POTASSIUM 3.8 06/23/2021    CHLORIDE 105 02/01/2024    CHLORIDE 107 07/13/2022    CHLORIDE 108 06/23/2021    CO2 27 02/01/2024    CO2 29 07/13/2022    CO2 31 06/23/2021    ANIONGAP 9 02/01/2024    ANIONGAP 2 (L) 07/13/2022    ANIONGAP 1 (L) 06/23/2021     (H) 08/23/2024     (H) 12/22/2023    GLC 99 07/13/2022    GLC 94 06/23/2021    BUN 14.1 02/01/2024    BUN 15 07/13/2022    BUN 13 06/23/2021    CR 0.91 02/01/2024    CR 0.98 06/23/2021    GFRESTIMATED >90 02/01/2024    GFRESTIMATED 83 06/23/2021    GFRESTBLACK >90 06/23/2021    STEPHEN 9.3 02/01/2024    STEPHEN 8.5 06/23/2021      A1C RESULTS:  Lab Results   Component Value Date    A1C 5.2 08/23/2024    A1C 5.6 06/23/2021     INR RESULTS:  Lab Results   Component Value Date    INR 1.21 (H) 12/19/2023    INR 1.32 (H) 12/19/2023            Medications     Current Outpatient Medications   Medication Sig Dispense Refill    acetaminophen (TYLENOL) 80 MG chewable tablet Take 80 mg by mouth every 4 hours as needed for mild pain or fever      amLODIPine (NORVASC) 2.5 MG tablet Take 1 tablet (2.5 mg) by mouth daily (Patient taking differently: Take 2.5 mg by mouth daily. Together with 5mg daily for total of 7.5 mg.) 90 tablet 3    amLODIPine (NORVASC) 5 MG tablet  Take 1 tablet (5 mg) by mouth daily 90 tablet 3    aspirin (ASA) 81 MG chewable tablet Take 2 tablets (162 mg) by mouth daily 90 tablet 4    atorvastatin (LIPITOR) 40 MG tablet Take 1 tablet (40 mg) by mouth daily 90 tablet 3    calcium carbonate (OS-STEPHEN) 500 MG tablet Take 1 tablet by mouth 2 times daily      metoprolol succinate ER (TOPROL XL) 25 MG 24 hr tablet Take 1 tablet (25 mg) by mouth daily 90 tablet 0    nitroGLYcerin (NITROSTAT) 0.4 MG sublingual tablet For chest pain place 1 tablet under the tongue every 5 minutes for 3 doses. If symptoms persist 5 minutes after 1st dose call 911. 30 tablet 1    polyethylene glycol (MIRALAX) 17 GM/Dose powder Take 17 g by mouth daily as needed for constipation (Patient not taking: Reported on 6/9/2025) 510 g 0    senna-docusate (SENOKOT-S/PERICOLACE) 8.6-50 MG tablet Take 1 tablet by mouth 2 times daily as needed for constipation (Patient not taking: Reported on 6/9/2025) 30 tablet 0          Past Medical History     Past Medical History:   Diagnosis Date    HTN (hypertension)     Hyperlipidemia LDL goal <100     Nocturia     Presbyopia     Seasonal allergies      Past Surgical History:   Procedure Laterality Date    BYPASS GRAFT ARTERY CORONARY N/A 12/19/2023    Procedure: CORONARY ARTERY BYPASS GRAFT x 3 (LEFT INTERNAL MAMMARY ARTERY - LEFT ANTERIOR DESCENDING ARTERY; RADIAL ARTERY - OBTUSE MARGIN; SAPHENOUS VEIN - POSTERIOR DESCENDING ARTERY), ENDOSCOPIC SAPHENOUS VEIN HARVEST ON THE LEFT LOWER EXTREMITY, ENDOSCOPIC RADIAL ARTERY HARVEST ON THE LEFT UPPER EXTREMITY, AND ON CARDIOPULMONARY PUMP OXYGENATOR    (INTRAOPERATIVE TRANSESOPHAGEAL ECHOCARDIOGRAM     COLONOSCOPY N/A 7/16/2018    Procedure: COLONOSCOPY;  colonoscopy;  Surgeon: Geremias Strickland MD;  Location:  GI    CV CORONARY ANGIOGRAM N/A 12/16/2023    Procedure: Coronary Angiogram;  Surgeon: Maverick Redmond MD;  Location:  [facility]ATH LAB    ESOPHAGOSCOPY, GASTROSCOPY, DUODENOSCOPY (EGD),  COMBINED N/A 1/14/2020    Procedure: ESOPHAGOGASTRODUODENOSCOPY, WITH BIOPSY;  Surgeon: Tez Vences MD;  Location: [facility] REMOVAL OF KIDNEY STONE       Family History   Problem Relation Age of Onset    No Known Problems Mother     No Known Problems Father             Allergies   Cephalosporins, Penicillin g, Amoxicillin, Fish oil, Shrimp, Penicillins, Amoxicillin, and Misc. sulfonamide containing compounds        Aida North NP  [facility]

## 2025-06-17 ENCOUNTER — RESULTS FOLLOW-UP (OUTPATIENT)
Dept: CARDIOLOGY | Facility: CLINIC | Age: 65
End: 2025-06-17

## 2025-06-17 ENCOUNTER — HOSPITAL ENCOUNTER (OUTPATIENT)
Dept: CARDIOLOGY | Facility: CLINIC | Age: 65
Discharge: HOME | End: 2025-06-17
Attending: NURSE PRACTITIONER
Payer: COMMERCIAL

## 2025-06-17 DIAGNOSIS — R07.89 ATYPICAL CHEST PAIN: ICD-10-CM

## 2025-06-17 PROCEDURE — 93321 DOPPLER ECHO F-UP/LMTD STD: CPT | Mod: 26 | Performed by: INTERNAL MEDICINE

## 2025-06-17 PROCEDURE — 93016 CV STRESS TEST SUPVJ ONLY: CPT | Performed by: INTERNAL MEDICINE

## 2025-06-17 PROCEDURE — 93325 DOPPLER ECHO COLOR FLOW MAPG: CPT | Mod: 26 | Performed by: INTERNAL MEDICINE

## 2025-06-17 PROCEDURE — 93325 DOPPLER ECHO COLOR FLOW MAPG: CPT | Mod: TC

## 2025-06-17 PROCEDURE — 93350 STRESS TTE ONLY: CPT | Mod: 26 | Performed by: INTERNAL MEDICINE

## 2025-06-17 PROCEDURE — 93018 CV STRESS TEST I&R ONLY: CPT | Performed by: INTERNAL MEDICINE

## 2025-06-18 ENCOUNTER — OFFICE VISIT (OUTPATIENT)
Dept: FAMILY MEDICINE | Facility: CLINIC | Age: 65
End: 2025-06-18
Payer: COMMERCIAL

## 2025-06-18 ENCOUNTER — RESULTS FOLLOW-UP (OUTPATIENT)
Dept: FAMILY MEDICINE | Facility: CLINIC | Age: 65
End: 2025-06-18

## 2025-06-18 VITALS
RESPIRATION RATE: 14 BRPM | WEIGHT: 190.8 LBS | HEART RATE: 53 BPM | OXYGEN SATURATION: 98 % | SYSTOLIC BLOOD PRESSURE: 135 MMHG | HEIGHT: 69 IN | TEMPERATURE: 96.9 F | BODY MASS INDEX: 28.26 KG/M2 | DIASTOLIC BLOOD PRESSURE: 81 MMHG

## 2025-06-18 DIAGNOSIS — Z95.1 S/P CABG (CORONARY ARTERY BYPASS GRAFT): ICD-10-CM

## 2025-06-18 DIAGNOSIS — I25.83 CORONARY ARTERY DISEASE DUE TO LIPID RICH PLAQUE: ICD-10-CM

## 2025-06-18 DIAGNOSIS — J30.2 SEASONAL ALLERGIC RHINITIS, UNSPECIFIED TRIGGER: ICD-10-CM

## 2025-06-18 DIAGNOSIS — Z13.1 SCREENING FOR DIABETES MELLITUS: ICD-10-CM

## 2025-06-18 DIAGNOSIS — R73.09 ELEVATED GLUCOSE: ICD-10-CM

## 2025-06-18 DIAGNOSIS — I25.10 CORONARY ARTERY DISEASE DUE TO LIPID RICH PLAQUE: ICD-10-CM

## 2025-06-18 DIAGNOSIS — E78.5 HYPERLIPIDEMIA LDL GOAL <100: ICD-10-CM

## 2025-06-18 DIAGNOSIS — I10 ESSENTIAL HYPERTENSION: Primary | ICD-10-CM

## 2025-06-18 DIAGNOSIS — G47.33 OSA (OBSTRUCTIVE SLEEP APNEA): ICD-10-CM

## 2025-06-18 DIAGNOSIS — Z12.5 SCREENING FOR PROSTATE CANCER: ICD-10-CM

## 2025-06-18 LAB
EST. AVERAGE GLUCOSE BLD GHB EST-MCNC: 111 MG/DL
HBA1C MFR BLD: 5.5 % (ref 0–5.6)
PSA SERPL DL<=0.01 NG/ML-MCNC: 1.14 NG/ML (ref 0–4.5)

## 2025-06-18 PROCEDURE — 3075F SYST BP GE 130 - 139MM HG: CPT | Performed by: INTERNAL MEDICINE

## 2025-06-18 PROCEDURE — 36415 COLL VENOUS BLD VENIPUNCTURE: CPT | Performed by: INTERNAL MEDICINE

## 2025-06-18 PROCEDURE — G0103 PSA SCREENING: HCPCS | Performed by: INTERNAL MEDICINE

## 2025-06-18 PROCEDURE — 83036 HEMOGLOBIN GLYCOSYLATED A1C: CPT | Performed by: INTERNAL MEDICINE

## 2025-06-18 PROCEDURE — 3079F DIAST BP 80-89 MM HG: CPT | Performed by: INTERNAL MEDICINE

## 2025-06-18 PROCEDURE — 3044F HG A1C LEVEL LT 7.0%: CPT | Performed by: INTERNAL MEDICINE

## 2025-06-18 PROCEDURE — 1126F AMNT PAIN NOTED NONE PRSNT: CPT | Performed by: INTERNAL MEDICINE

## 2025-06-18 PROCEDURE — G2211 COMPLEX E/M VISIT ADD ON: HCPCS | Performed by: INTERNAL MEDICINE

## 2025-06-18 PROCEDURE — 99214 OFFICE O/P EST MOD 30 MIN: CPT | Performed by: INTERNAL MEDICINE

## 2025-06-18 RX ORDER — NITROGLYCERIN 0.4 MG/1
TABLET SUBLINGUAL
Qty: 30 TABLET | Refills: 5 | Status: SHIPPED | OUTPATIENT
Start: 2025-06-18

## 2025-06-18 RX ORDER — METOPROLOL SUCCINATE 25 MG/1
25 TABLET, EXTENDED RELEASE ORAL DAILY
Qty: 90 TABLET | Refills: 3 | Status: SHIPPED | OUTPATIENT
Start: 2025-06-18

## 2025-06-18 RX ORDER — ATORVASTATIN CALCIUM 40 MG/1
40 TABLET, FILM COATED ORAL DAILY
Qty: 90 TABLET | Refills: 3 | Status: SHIPPED | OUTPATIENT
Start: 2025-06-18

## 2025-06-18 ASSESSMENT — PAIN SCALES - GENERAL: PAINLEVEL_OUTOF10: NO PAIN (0)

## 2025-06-18 NOTE — PROGRESS NOTES
"  Assessment & Plan     Essential hypertension  - stable, BP is at goal  - metoprolol succinate ER (TOPROL XL) 25 MG 24 hr tablet  Dispense: 90 tablet; Refill: 3    Coronary artery disease due to lipid rich plaque  S/P CABG (coronary artery bypass graft)  - stable, no chest pains  - nitroGLYcerin (NITROSTAT) 0.4 MG sublingual tablet  Dispense: 30 tablet; Refill: 5  - continue cardiology clinic follow up    Hyperlipidemia LDL goal <100  - stable  - atorvastatin (LIPITOR) 40 MG tablet  Dispense: 90 tablet; Refill: 3    Screening for prostate cancer  - PSA, screen    Screening for diabetes mellitus  Elevated glucose  - Hemoglobin A1c    Seasonal allergic rhinitis, unspecified trigger  - stable, continue current therapy    AWA (obstructive sleep apnea)  - stable, continue current CPAP therapy          BMI  Estimated body mass index is 28.18 kg/m  as calculated from the following:    Height as of this encounter: 1.753 m (5' 9\").    Weight as of this encounter: 86.5 kg (190 lb 12.8 oz).   Weight management plan: Discussed healthy diet and exercise guidelines      Follow-up in 6 months      Subjective   Drew is a 64 year old, presenting for the following health issues:  Medication Follow-up    HPI      Current Outpatient Medications   Medication Sig Dispense Refill    atorvastatin (LIPITOR) 40 MG tablet Take 1 tablet (40 mg) by mouth daily. 90 tablet 3    metoprolol succinate ER (TOPROL XL) 25 MG 24 hr tablet Take 1 tablet (25 mg) by mouth daily. 90 tablet 3    nitroGLYcerin (NITROSTAT) 0.4 MG sublingual tablet For chest pain place 1 tablet under the tongue every 5 minutes for 3 doses. If symptoms persist 5 minutes after 1st dose call 911. 30 tablet 5    acetaminophen (TYLENOL) 80 MG chewable tablet Take 80 mg by mouth every 4 hours as needed for mild pain or fever      amLODIPine (NORVASC) 10 MG tablet Take 1 tablet (10 mg) by mouth daily. 90 tablet 3    aspirin (ASA) 81 MG chewable tablet Take 2 tablets (162 mg) by " mouth daily. 90 tablet 4    calcium carbonate (OS-STEPHEN) 500 MG tablet Take 1 tablet by mouth 2 times daily       No current facility-administered medications for this visit.     Past Medical History:   Diagnosis Date    Arthritis     HTN (hypertension)     Hyperlipidemia LDL goal <100     Nocturia     Presbyopia     Seasonal allergies      Social History     Socioeconomic History    Marital status:      Spouse name: Not on file    Number of children: Not on file    Years of education: Not on file    Highest education level: Not on file   Occupational History    Not on file   Tobacco Use    Smoking status: Former    Smokeless tobacco: Never    Tobacco comments:     Quit 20 years ago   Vaping Use    Vaping status: Never Used   Substance and Sexual Activity    Alcohol use: Not Currently     Alcohol/week: 2.0 standard drinks of alcohol     Types: 2 Cans of beer per week     Comment: not drinking anymore per pt    Drug use: Never    Sexual activity: Yes     Partners: Female   Other Topics Concern    Parent/sibling w/ CABG, MI or angioplasty before 65F 55M? Not Asked   Social History Narrative    ** Merged History Encounter **          Social Drivers of Health     Financial Resource Strain: Low Risk  (8/23/2024)    Financial Resource Strain     Within the past 12 months, have you or your family members you live with been unable to get utilities (heat, electricity) when it was really needed?: No   Food Insecurity: Low Risk  (8/23/2024)    Food Insecurity     Within the past 12 months, did you worry that your food would run out before you got money to buy more?: No     Within the past 12 months, did the food you bought just not last and you didn t have money to get more?: No   Transportation Needs: Low Risk  (8/23/2024)    Transportation Needs     Within the past 12 months, has lack of transportation kept you from medical appointments, getting your medicines, non-medical meetings or appointments, work, or from getting  "things that you need?: No   Physical Activity: Sufficiently Active (8/23/2024)    Exercise Vital Sign     Days of Exercise per Week: 5 days     Minutes of Exercise per Session: 30 min   Stress: No Stress Concern Present (8/23/2024)    [employer] [place] [employer] - Occupational Stress Questionnaire     Feeling of Stress : Only a little   Social Connections: Unknown (8/23/2024)    Social Connection and Isolation Panel [NHANES]     Frequency of Communication with Friends and Family: Not on file     Frequency of Social Gatherings with Friends and Family: Once a week     Attends Judaism Services: Not on file     Active Member of Clubs or Organizations: Not on file     Attends Club or Organization Meetings: Not on file     Marital Status: Not on file   Interpersonal Safety: Low Risk  (6/18/2025)    Interpersonal Safety     Do you feel physically and emotionally safe where you currently live?: Yes     Within the past 12 months, have you been hit, slapped, kicked or otherwise physically hurt by someone?: No     Within the past 12 months, have you been humiliated or emotionally abused in other ways by your partner or ex-partner?: No   Housing Stability: Low Risk  (8/23/2024)    Housing Stability     Do you have housing? : Yes     Are you worried about losing your housing?: No     Family History   Problem Relation Age of Onset    Hypertension Mother     Hypertension Father         passed away    Cerebrovascular Disease Father         cerebral infarction         Review of Systems  Constitutional, HEENT, cardiovascular, pulmonary, GI, , musculoskeletal, neuro, skin, endocrine and psych systems are negative, except as otherwise noted.      Objective    /81 (BP Location: Right arm, Patient Position: Sitting, Cuff Size: Adult Regular)   Pulse 53   Temp 96.9  F (36.1  C) (Temporal)   Resp 14   Ht 1.753 m (5' 9\")   Wt 86.5 kg (190 lb 12.8 oz)   SpO2 98%   BMI 28.18 kg/m    Body mass index is 28.18 " kg/m .  Physical Exam   GENERAL: alert and no distress  EYES: Eyes grossly normal to inspection, conjunctivae and sclerae normal  HENT: normocephalic atraumatic  NECK: no asymmetry  RESP: lungs clear to auscultation - no rales, rhonchi or wheezes  CV: regular rate and rhythm, normal S1 S2  ABDOMEN: soft, nondistended  MS: no gross musculoskeletal defects noted, no edema  SKIN: no suspicious lesions or rashes  NEURO: Normal strength and tone, mentation intact and speech normal  PSYCH: mentation appears normal, affect normal/bright    Labs reviewed in Epic    The longitudinal plan of care for the diagnosis(es)/condition(s) as documented were addressed during this visit. Due to the added complexity in care, I will continue to support Drew in the subsequent management and with ongoing continuity of care.          Signed Electronically by: Tara Mehta MD

## 2025-07-24 ENCOUNTER — PATIENT OUTREACH (OUTPATIENT)
Dept: CARE COORDINATION | Facility: CLINIC | Age: 65
End: 2025-07-24

## 2025-07-24 ENCOUNTER — OFFICE VISIT (OUTPATIENT)
Dept: SLEEP MEDICINE | Facility: CLINIC | Age: 65
End: 2025-07-24
Payer: COMMERCIAL

## 2025-07-24 VITALS
OXYGEN SATURATION: 98 % | SYSTOLIC BLOOD PRESSURE: 113 MMHG | HEIGHT: 68 IN | DIASTOLIC BLOOD PRESSURE: 74 MMHG | BODY MASS INDEX: 29.25 KG/M2 | HEART RATE: 63 BPM | WEIGHT: 193 LBS

## 2025-07-24 DIAGNOSIS — G47.33 OSA (OBSTRUCTIVE SLEEP APNEA): Primary | ICD-10-CM

## 2025-07-24 NOTE — PROGRESS NOTES
[facility]   Outpatient Sleep Medicine  Jul 24, 2025       Name: Drew Bradford MRN# [id number]   Age: 64 year old YOB: 1960            Assessment and Plan:   1. AWA (obstructive sleep apnea) (Primary)  Patient's sleep apnea is adequately managed and well treated with current PAP settings [id number] with low residual AHI of 1.0 event per hour. Compliance is excellent. Tolerating PAP well. Daytime symptoms and nighttime sleep quality are improved with use. No indication to adjust settings today. He will continue nightly therapy and prescription renewed for supplies. Follow-up in about 1 year for annual visit or sooner prn.   - Comprehensive DME       Chief Complaint      Chief Complaint   Patient presents with    CPAP Follow Up          History of Present Illness:     Drew Bradford is a 64 year old male with CAD, HTN, allergic rhinitis who presents to the clinic for follow-up of their severe obstructive sleep apnea treated with CPAP.     Visit completed with Mandarin  ([employer]  # [id number]) via video.     Originally diagnosed with AWA via split night PSG 1/19/2019 (196#) - AHI 68.1, RDI 70.4, Supine AHI 68.1, REM AHI 40.0, Low O2% 49.8%, Time Spent <=88% 38.0, Time Spent <=89% 43.9. Treatment was titrated to a pressure of CPAP 9 with an AHI 0.8. Time spent in REM supine at this pressure was 57.0 minutes.    Started CPAP 2/2019 and using ever since. DME is [facility].     Returns today for annual PAP follow-up visit, last seen by Dr. Arreola 7/2024. No concerns today, reports things are going very well with the machine but is needing prescription renewed for supplies so he can get new filters.     Patient is using a nasal pillow mask. The mask is comfortable. The mask is not leaking. They are not snoring with the mask on. They are not having gasp arousals. They are not having significant oral/nasal dryness or epistaxis though does keep water at bedside of wakes with dry throat. They are not  "having pain/skin breakdown. The pressure settings are comfortable.     Bedtime is typically 10:00PM and wakes typically 6:00AM. States \"it works really great for me I fall asleep quickly\".     Respironics Dreamstation 2 Auto-PAP 10-15 cmH2O download (6/23/25-7/22/25):  30 total days of use. 0 nonuse days.  Average use 7 hours 29 minutes per day.  30 days with >4 hours use.  Large leak 0 sec per day average. CPAP 90% pressure 12.0cm. AHI 1.0    He had a total [place] Sleepiness Scale of 7 (07/24/25 1300), with scores of 10 or higher indicating abnormal levels of sleepiness.     Past medical/surgical history, family history, social history, medications and allergies were reviewed.           Physical Examination:   /74   Pulse 63   Ht 1.727 m (5' 8\")   Wt 87.5 kg (193 lb)   SpO2 98%   BMI 29.35 kg/m    General appearance: Awake, alert, cooperative. Well groomed. Sitting comfortably in chair. In no apparent distress.  HEENT: Head: Normocephalic, atraumatic. Eyes:Conjunctiva clear. Sclera normal. Nose: External appearance without deformity.   Pulmonary:  Able to speak easily in full sentences. No cough or wheeze.   Skin:  No rashes or significant lesions on visible skin.   Neurologic: Alert, oriented x3.   Psychiatric: Mood euthymic. Affect congruent with full range and intensity.      CC:  Tara Mehta PA-C  Jul 24, 2025     [facility]  [id number] [place] [address] [facility] Center  [address] [address]    Chart documentation was completed, in part, with Windation voice-recognition software. Even though reviewed, some grammatical, spelling, and word errors may remain.    27 minutes spent on day of encounter doing chart review, history and exam, documentation, and further activities as noted above    "

## 2025-08-11 ENCOUNTER — CARE COORDINATION (OUTPATIENT)
Dept: CARDIOLOGY | Facility: CLINIC | Age: 65
End: 2025-08-11
Payer: COMMERCIAL

## (undated) DEVICE — SU MONOCRYL 4-0 PS-2 18" UND Y496G

## (undated) DEVICE — DRAIN CHEST TUBE 32FR STR 8032

## (undated) DEVICE — DRAIN CHEST TUBE RIGHT ANGLED 28FR 8128

## (undated) DEVICE — SU SILK 1 TIE 10X30" SA87G

## (undated) DEVICE — LINEN TOWEL PACK X5 5464

## (undated) DEVICE — SU PROLENE 8-0 BV175-6DA 2X24" M8743

## (undated) DEVICE — ADPT PERFUSION MULTIPLE

## (undated) DEVICE — LEAD PACER MYOCARDIAL BIPOLAR TEMPORARY 53CM 6495F

## (undated) DEVICE — DEVICE TISSUE STABILIZATION OCTOBASE 28707

## (undated) DEVICE — KIT HAND CONTROL ANGIOTOUCH ACIST 65CM AT-P65

## (undated) DEVICE — SYR EAR BULB 3OZ 0035830

## (undated) DEVICE — KIT ENDO VASOVIEW HEMOPRO 2 VH-4000

## (undated) DEVICE — DEFIB PRO-PADZ LVP LQD GEL ADULT 8900-2105-01

## (undated) DEVICE — SU STEEL 6 CCS 4X18" M654G

## (undated) DEVICE — KIT WASH CELL SAVING ATL2001

## (undated) DEVICE — CLIP HORIZON MULTI MED BLUE 002204

## (undated) DEVICE — SUCTION CANISTER MEDIVAC LINER 3000ML W/LID 65651-530

## (undated) DEVICE — TOTE ANGIO CORP PC15AT SAN32CC83O

## (undated) DEVICE — SU PROLENE 8-0 BV130-5 24" M8732

## (undated) DEVICE — CATH GUIDING BLUE YELLOW PTFE XB3 6FRX100CM 67005200

## (undated) DEVICE — MANIFOLD KIT ANGIO AUTOMATED 014613

## (undated) DEVICE — COVER TABLE POLY 65X90" 8186

## (undated) DEVICE — CONNECTOR PERFUSION STR 1/2X1/2" W/O LL 6025

## (undated) DEVICE — OXYGENATOR PERFUSION AFFINITY FUSION BB841

## (undated) DEVICE — SU ETHIBOND 3-0 BBDA 36" X588H

## (undated) DEVICE — POSITIONER ASSIST ESSTECH 3S T401210S

## (undated) DEVICE — INTRO GLIDESHEATH SLENDER 6FR 10X45CM 60-1060

## (undated) DEVICE — GOWN IMPERVIOUS SPECIALTY XL/XLONG 39049

## (undated) DEVICE — SU ETHIBOND 2-0 SHDA 30" X563H

## (undated) DEVICE — SU VICRYL 2-0 CT-1 27" UND J259H

## (undated) DEVICE — SOL NACL 0.9% IRRIG 1000ML BOTTLE 2F7124

## (undated) DEVICE — DRSG KERLIX 4 1/2"X4YDS ROLL 6715

## (undated) DEVICE — RX SURGIFLO HEMOSTATIC MATRIX W/THROMBIN 8ML 2994

## (undated) DEVICE — SLEEVE TR BAND RADIAL COMPRESSION DEVICE 24CM TRB24-REG

## (undated) DEVICE — SU PROLENE 7-0 BV-1DA 4X30" M8703

## (undated) DEVICE — CANNULA VENOUS 2 STAGE 32-40FR

## (undated) DEVICE — RESERVOIR CELL SAVING BLOOD COLLECTION EL2120

## (undated) DEVICE — PACK OPEN HEART PV12OH524

## (undated) DEVICE — LINEN LEG ROLL 5489

## (undated) DEVICE — DECANTER BAG 2002S

## (undated) DEVICE — SYR RED NITRO PRNT 10CC

## (undated) DEVICE — LEAD ELEC MYOCARDIO PACING TEMPORARY MEDTRONIC

## (undated) DEVICE — SU PROLENE 6-0 C-1DA 30" M8706

## (undated) DEVICE — SU PROLENE 4-0 SHDA 36" 8521H

## (undated) DEVICE — DEVICE ASSEMBLY SUCTION/ANTI COAG BTC93

## (undated) DEVICE — VALVE HEMOSTASIS GUARDIAN II OD8 FR GUIDEWIRE 8215

## (undated) DEVICE — BLOWER/MISTER CLEARVIEW 22150

## (undated) DEVICE — SU STEEL MYO/WIRE II STERNOTOMY 8 BE-1 3X14" 048-217

## (undated) DEVICE — CATH DIAGNOSTIC RADIAL 5FR TIG 4.0

## (undated) DEVICE — PACK SURGICAL PROCEDURE CUSTOM 1/2IN X 3/8IN BB11W18R1

## (undated) DEVICE — SOL WATER IRRIG 1000ML BOTTLE 2F7114

## (undated) DEVICE — DRAPE MAYO STAND 23X54 8337

## (undated) DEVICE — SU ETHIBOND 0 CT-1 CR 8X18" CX21D

## (undated) DEVICE — BLADE KNIFE BEAVER MINI STR BEAVER6900

## (undated) DEVICE — SU PROLENE 5-0 RB-2DA 30" 8710H

## (undated) DEVICE — CANNULA PERFUSION ARTERIAL 20FR 12" 77420

## (undated) DEVICE — CABLE MYO/LEAD PACING WHITE DISP 019-530

## (undated) DEVICE — SOMASENSOR CEREBRAL OXIMETER ADULT SAFB-SM

## (undated) DEVICE — SU PROLENE 4-0 RB-1DA 36" 8557H

## (undated) DEVICE — WIRE GUIDE 0.035"X260CM SAFE-T-J EXCHANGE G00517

## (undated) DEVICE — PUMP CP37 QUANTUM CENTRIFUGAL BLOOD CP37V-V0

## (undated) DEVICE — PUNCH AORTIC 4.0MMX8" RCB40

## (undated) DEVICE — PROTECTOR ARM ONE-STEP TRENDELENBURG 40418

## (undated) DEVICE — LINEN TOWEL PACK X6 WHITE 5487

## (undated) DEVICE — CANNULA PERFUSION AORTIC ROOT 22FR 20012

## (undated) DEVICE — SU VICRYL 0 CTX 36" J370H

## (undated) DEVICE — CONNECTOR PERFUSION Y 3/8X3/8" W/O LL 6031

## (undated) DEVICE — Device

## (undated) DEVICE — SU PLEDGET SOFT TFE 3/8"X3/26"X1/16" PCP40

## (undated) DEVICE — GLOVE GAMMEX NEOPRENE ULTRA SZ 7.5 LF 8515

## (undated) DEVICE — PREP CHLORAPREP W/ORANGE TINT 10.5ML 930715

## (undated) DEVICE — LINEN TOWEL PACK X30 5481

## (undated) DEVICE — INFL DVC BASIXCOMPAK PLYCRB 30 ATM 13IN 20ML IN4530

## (undated) RX ORDER — HEPARIN SODIUM 1000 [USP'U]/ML
INJECTION, SOLUTION INTRAVENOUS; SUBCUTANEOUS
Status: DISPENSED
Start: 2023-12-19

## (undated) RX ORDER — NICARDIPINE HYDROCHLORIDE 2.5 MG/ML
INJECTION INTRAVENOUS
Status: DISPENSED
Start: 2023-12-19

## (undated) RX ORDER — PROTAMINE SULFATE 10 MG/ML
INJECTION, SOLUTION INTRAVENOUS
Status: DISPENSED
Start: 2023-12-19

## (undated) RX ORDER — GLYCOPYRROLATE 0.2 MG/ML
INJECTION, SOLUTION INTRAMUSCULAR; INTRAVENOUS
Status: DISPENSED
Start: 2023-12-19

## (undated) RX ORDER — ONDANSETRON 2 MG/ML
INJECTION INTRAMUSCULAR; INTRAVENOUS
Status: DISPENSED
Start: 2023-12-19

## (undated) RX ORDER — LIDOCAINE HYDROCHLORIDE 10 MG/ML
INJECTION, SOLUTION EPIDURAL; INFILTRATION; INTRACAUDAL; PERINEURAL
Status: DISPENSED
Start: 2023-12-19

## (undated) RX ORDER — LIDOCAINE HYDROCHLORIDE 10 MG/ML
INJECTION, SOLUTION EPIDURAL; INFILTRATION; INTRACAUDAL; PERINEURAL
Status: DISPENSED
Start: 2023-12-16

## (undated) RX ORDER — PROPOFOL 10 MG/ML
INJECTION, EMULSION INTRAVENOUS
Status: DISPENSED
Start: 2023-12-19

## (undated) RX ORDER — FENTANYL CITRATE 50 UG/ML
INJECTION, SOLUTION INTRAMUSCULAR; INTRAVENOUS
Status: DISPENSED
Start: 2023-12-16

## (undated) RX ORDER — FENTANYL CITRATE 0.05 MG/ML
INJECTION, SOLUTION INTRAMUSCULAR; INTRAVENOUS
Status: DISPENSED
Start: 2023-12-19

## (undated) RX ORDER — FENTANYL CITRATE 50 UG/ML
INJECTION, SOLUTION INTRAMUSCULAR; INTRAVENOUS
Status: DISPENSED
Start: 2018-07-16

## (undated) RX ORDER — VERAPAMIL HYDROCHLORIDE 2.5 MG/ML
INJECTION, SOLUTION INTRAVENOUS
Status: DISPENSED
Start: 2023-12-19

## (undated) RX ORDER — CLINDAMYCIN PHOSPHATE 900 MG/50ML
INJECTION, SOLUTION INTRAVENOUS
Status: DISPENSED
Start: 2023-12-19

## (undated) RX ORDER — PAPAVERINE HYDROCHLORIDE 30 MG/ML
INJECTION INTRAMUSCULAR; INTRAVENOUS
Status: DISPENSED
Start: 2023-12-19

## (undated) RX ORDER — NITROGLYCERIN 5 MG/ML
VIAL (ML) INTRAVENOUS
Status: DISPENSED
Start: 2023-12-16

## (undated) RX ORDER — HEPARIN SODIUM 1000 [USP'U]/ML
INJECTION, SOLUTION INTRAVENOUS; SUBCUTANEOUS
Status: DISPENSED
Start: 2023-12-16

## (undated) RX ORDER — VECURONIUM BROMIDE 1 MG/ML
INJECTION, POWDER, LYOPHILIZED, FOR SOLUTION INTRAVENOUS
Status: DISPENSED
Start: 2023-12-19

## (undated) RX ORDER — LIDOCAINE HYDROCHLORIDE 10 MG/ML
INJECTION, SOLUTION INFILTRATION; PERINEURAL
Status: DISPENSED
Start: 2023-12-19

## (undated) RX ORDER — HEPARIN SODIUM 200 [USP'U]/100ML
INJECTION, SOLUTION INTRAVENOUS
Status: DISPENSED
Start: 2023-12-16

## (undated) RX ORDER — FENTANYL CITRATE 50 UG/ML
INJECTION, SOLUTION INTRAMUSCULAR; INTRAVENOUS
Status: DISPENSED
Start: 2020-01-14

## (undated) RX ORDER — VERAPAMIL HYDROCHLORIDE 2.5 MG/ML
INJECTION, SOLUTION INTRAVENOUS
Status: DISPENSED
Start: 2023-12-16

## (undated) RX ORDER — VANCOMYCIN HYDROCHLORIDE 500 MG/10ML
INJECTION, POWDER, LYOPHILIZED, FOR SOLUTION INTRAVENOUS
Status: DISPENSED
Start: 2023-12-19